# Patient Record
Sex: FEMALE | Race: WHITE | HISPANIC OR LATINO | Employment: OTHER | URBAN - METROPOLITAN AREA
[De-identification: names, ages, dates, MRNs, and addresses within clinical notes are randomized per-mention and may not be internally consistent; named-entity substitution may affect disease eponyms.]

---

## 2017-10-16 ENCOUNTER — OFFICE VISIT - HEALTHEAST (OUTPATIENT)
Dept: FAMILY MEDICINE | Facility: CLINIC | Age: 22
End: 2017-10-16

## 2017-10-16 ENCOUNTER — COMMUNICATION - HEALTHEAST (OUTPATIENT)
Dept: FAMILY MEDICINE | Facility: CLINIC | Age: 22
End: 2017-10-16

## 2017-10-16 DIAGNOSIS — Z34.90 PREGNANCY: ICD-10-CM

## 2017-10-16 ASSESSMENT — MIFFLIN-ST. JEOR: SCORE: 1499.22

## 2017-11-06 ENCOUNTER — COMMUNICATION - HEALTHEAST (OUTPATIENT)
Dept: SCHEDULING | Facility: CLINIC | Age: 22
End: 2017-11-06

## 2017-11-07 ENCOUNTER — OFFICE VISIT - HEALTHEAST (OUTPATIENT)
Dept: FAMILY MEDICINE | Facility: CLINIC | Age: 22
End: 2017-11-07

## 2017-11-07 DIAGNOSIS — O21.9 VOMITING DURING PREGNANCY: ICD-10-CM

## 2017-11-07 ASSESSMENT — MIFFLIN-ST. JEOR: SCORE: 1476.77

## 2017-11-21 ENCOUNTER — AMBULATORY - HEALTHEAST (OUTPATIENT)
Dept: FAMILY MEDICINE | Facility: CLINIC | Age: 22
End: 2017-11-21

## 2017-11-21 ENCOUNTER — PRENATAL OFFICE VISIT - HEALTHEAST (OUTPATIENT)
Dept: FAMILY MEDICINE | Facility: CLINIC | Age: 22
End: 2017-11-21

## 2017-11-21 DIAGNOSIS — Z34.90 PREGNANCY: ICD-10-CM

## 2017-11-21 DIAGNOSIS — O23.40 UTI IN PREGNANCY: ICD-10-CM

## 2017-11-21 DIAGNOSIS — Z12.4 CERVICAL CANCER SCREENING: ICD-10-CM

## 2017-11-21 DIAGNOSIS — Z23 NEED FOR IMMUNIZATION AGAINST INFLUENZA: ICD-10-CM

## 2017-11-21 LAB — HIV 1+2 AB+HIV1 P24 AG SERPL QL IA: NEGATIVE

## 2017-11-21 ASSESSMENT — MIFFLIN-ST. JEOR: SCORE: 1479.47

## 2017-11-22 LAB
HBV SURFACE AG SERPL QL IA: NEGATIVE
SYPHILIS RPR SCREEN - HISTORICAL: NORMAL

## 2017-11-24 ENCOUNTER — HOSPITAL ENCOUNTER (OUTPATIENT)
Dept: ULTRASOUND IMAGING | Facility: CLINIC | Age: 22
Discharge: HOME | End: 2017-11-24
Attending: FAMILY MEDICINE

## 2017-11-24 ENCOUNTER — AMBULATORY - HEALTHEAST (OUTPATIENT)
Dept: FAMILY MEDICINE | Facility: CLINIC | Age: 22
End: 2017-11-24

## 2017-11-24 DIAGNOSIS — Z34.90 PREGNANCY: ICD-10-CM

## 2017-11-24 DIAGNOSIS — Z12.4 CERVICAL CANCER SCREENING: ICD-10-CM

## 2017-12-06 ENCOUNTER — COMMUNICATION - HEALTHEAST (OUTPATIENT)
Dept: FAMILY MEDICINE | Facility: CLINIC | Age: 22
End: 2017-12-06

## 2017-12-19 ENCOUNTER — PRENATAL OFFICE VISIT - HEALTHEAST (OUTPATIENT)
Dept: FAMILY MEDICINE | Facility: CLINIC | Age: 22
End: 2017-12-19

## 2017-12-19 DIAGNOSIS — Z34.90 PREGNANCY: ICD-10-CM

## 2017-12-19 ASSESSMENT — MIFFLIN-ST. JEOR: SCORE: 1465.29

## 2018-01-15 ENCOUNTER — PRENATAL OFFICE VISIT - HEALTHEAST (OUTPATIENT)
Dept: FAMILY MEDICINE | Facility: CLINIC | Age: 23
End: 2018-01-15

## 2018-01-15 DIAGNOSIS — Z34.90 PREGNANCY: ICD-10-CM

## 2018-01-15 ASSESSMENT — MIFFLIN-ST. JEOR: SCORE: 1452.82

## 2018-01-30 ENCOUNTER — HOSPITAL ENCOUNTER (OUTPATIENT)
Dept: ULTRASOUND IMAGING | Facility: CLINIC | Age: 23
Discharge: HOME | End: 2018-01-30
Attending: FAMILY MEDICINE

## 2018-01-30 DIAGNOSIS — Z34.90 PREGNANCY: ICD-10-CM

## 2018-02-16 ENCOUNTER — PRENATAL OFFICE VISIT - HEALTHEAST (OUTPATIENT)
Dept: FAMILY MEDICINE | Facility: CLINIC | Age: 23
End: 2018-02-16

## 2018-02-16 DIAGNOSIS — Z34.90 PREGNANCY: ICD-10-CM

## 2018-02-16 DIAGNOSIS — O09.299 H/O PRE-ECLAMPSIA IN PRIOR PREGNANCY, CURRENTLY PREGNANT: ICD-10-CM

## 2018-02-16 ASSESSMENT — MIFFLIN-ST. JEOR: SCORE: 1484.57

## 2018-03-16 ENCOUNTER — PRENATAL OFFICE VISIT - HEALTHEAST (OUTPATIENT)
Dept: FAMILY MEDICINE | Facility: CLINIC | Age: 23
End: 2018-03-16

## 2018-03-16 DIAGNOSIS — Z34.90 PREGNANCY: ICD-10-CM

## 2018-03-16 LAB
ALBUMIN UR-MCNC: ABNORMAL MG/DL
AMORPH CRY #/AREA URNS HPF: ABNORMAL /[HPF]
APPEARANCE UR: ABNORMAL
BACTERIA #/AREA URNS HPF: ABNORMAL HPF
BILIRUB UR QL STRIP: NEGATIVE
CAOX CRY #/AREA URNS HPF: PRESENT /[HPF]
COLOR UR AUTO: YELLOW
GLUCOSE UR STRIP-MCNC: NEGATIVE MG/DL
HGB UR QL STRIP: NEGATIVE
KETONES UR STRIP-MCNC: NEGATIVE MG/DL
LEUKOCYTE ESTERASE UR QL STRIP: ABNORMAL
MUCOUS THREADS #/AREA URNS LPF: ABNORMAL LPF
NITRATE UR QL: NEGATIVE
PH UR STRIP: 6.5 [PH] (ref 5–8)
RBC #/AREA URNS AUTO: ABNORMAL HPF
SP GR UR STRIP: 1.02 (ref 1–1.03)
SQUAMOUS #/AREA URNS AUTO: >100 LPF
UROBILINOGEN UR STRIP-ACNC: ABNORMAL
WBC #/AREA URNS AUTO: ABNORMAL HPF

## 2018-03-16 ASSESSMENT — MIFFLIN-ST. JEOR: SCORE: 1519.72

## 2018-03-19 LAB — BACTERIA SPEC CULT: ABNORMAL

## 2018-03-20 ENCOUNTER — AMBULATORY - HEALTHEAST (OUTPATIENT)
Dept: FAMILY MEDICINE | Facility: CLINIC | Age: 23
End: 2018-03-20

## 2018-03-20 DIAGNOSIS — O23.40 UTI (URINARY TRACT INFECTION) DURING PREGNANCY: ICD-10-CM

## 2018-04-09 ENCOUNTER — AMBULATORY - HEALTHEAST (OUTPATIENT)
Dept: NURSING | Facility: CLINIC | Age: 23
End: 2018-04-09

## 2018-04-13 ENCOUNTER — AMBULATORY - HEALTHEAST (OUTPATIENT)
Dept: FAMILY MEDICINE | Facility: CLINIC | Age: 23
End: 2018-04-13

## 2018-04-13 ENCOUNTER — PRENATAL OFFICE VISIT - HEALTHEAST (OUTPATIENT)
Dept: FAMILY MEDICINE | Facility: CLINIC | Age: 23
End: 2018-04-13

## 2018-04-13 DIAGNOSIS — O09.299 H/O PRE-ECLAMPSIA IN PRIOR PREGNANCY, CURRENTLY PREGNANT: ICD-10-CM

## 2018-04-13 DIAGNOSIS — O99.019 ANEMIA IN PREGNANCY: ICD-10-CM

## 2018-04-13 DIAGNOSIS — Z34.90 PREGNANCY: ICD-10-CM

## 2018-04-13 DIAGNOSIS — O23.40 UTI (URINARY TRACT INFECTION) DURING PREGNANCY: ICD-10-CM

## 2018-04-13 DIAGNOSIS — R73.09 GLUCOSE TOLERANCE TEST ABNORMAL: ICD-10-CM

## 2018-04-13 LAB
ALBUMIN UR-MCNC: ABNORMAL MG/DL
ALT SERPL W P-5'-P-CCNC: 16 U/L (ref 0–45)
AMORPH CRY #/AREA URNS HPF: ABNORMAL /[HPF]
APPEARANCE UR: ABNORMAL
APTT PPP: 28 SECONDS (ref 24–37)
AST SERPL W P-5'-P-CCNC: 21 U/L (ref 0–40)
BACTERIA #/AREA URNS HPF: ABNORMAL HPF
BILIRUB UR QL STRIP: NEGATIVE
COLOR UR AUTO: YELLOW
CREAT SERPL-MCNC: 0.53 MG/DL (ref 0.6–1.1)
ERYTHROCYTE [DISTWIDTH] IN BLOOD BY AUTOMATED COUNT: 13.3 % (ref 11–14.5)
FASTING STATUS PATIENT QL REPORTED: NO
GFR SERPL CREATININE-BSD FRML MDRD: >60 ML/MIN/1.73M2
GLUCOSE 1H P 50 G GLC PO SERPL-MCNC: 150 MG/DL (ref 70–139)
GLUCOSE UR STRIP-MCNC: ABNORMAL MG/DL
HCT VFR BLD AUTO: 27.2 % (ref 35–47)
HGB BLD-MCNC: 9 G/DL (ref 12–16)
HGB UR QL STRIP: NEGATIVE
INR PPP: 0.9 (ref 0.9–1.1)
KETONES UR STRIP-MCNC: ABNORMAL MG/DL
LEUKOCYTE ESTERASE UR QL STRIP: ABNORMAL
MCH RBC QN AUTO: 24.2 PG (ref 27–34)
MCHC RBC AUTO-ENTMCNC: 33 G/DL (ref 32–36)
MCV RBC AUTO: 73 FL (ref 80–100)
MUCOUS THREADS #/AREA URNS LPF: ABNORMAL LPF
NITRATE UR QL: NEGATIVE
PH UR STRIP: 6.5 [PH] (ref 5–8)
PLATELET # BLD AUTO: 271 THOU/UL (ref 140–440)
PMV BLD AUTO: 9.3 FL (ref 7–10)
RBC # BLD AUTO: 3.7 MILL/UL (ref 3.8–5.4)
RBC #/AREA URNS AUTO: ABNORMAL HPF
SP GR UR STRIP: 1.02 (ref 1–1.03)
SQUAMOUS #/AREA URNS AUTO: ABNORMAL LPF
URATE SERPL-MCNC: 2.9 MG/DL (ref 2–7.5)
UROBILINOGEN UR STRIP-ACNC: ABNORMAL
WBC #/AREA URNS AUTO: ABNORMAL HPF
WBC: 8.6 THOU/UL (ref 4–11)

## 2018-04-13 ASSESSMENT — MIFFLIN-ST. JEOR: SCORE: 1525.39

## 2018-04-14 LAB
CREAT UR-MCNC: 121.1 MG/DL
PROTEIN, RANDOM URINE - HISTORICAL: 19 MG/DL
PROTEIN/CREAT RATIO, RANDOM UR: 0.16
T PALLIDUM AB SER QL: NEGATIVE

## 2018-04-17 ENCOUNTER — AMBULATORY - HEALTHEAST (OUTPATIENT)
Dept: LAB | Facility: CLINIC | Age: 23
End: 2018-04-17

## 2018-04-17 ENCOUNTER — COMMUNICATION - HEALTHEAST (OUTPATIENT)
Dept: FAMILY MEDICINE | Facility: CLINIC | Age: 23
End: 2018-04-17

## 2018-04-17 DIAGNOSIS — R73.09 GLUCOSE TOLERANCE TEST ABNORMAL: ICD-10-CM

## 2018-04-17 LAB
BACTERIA SPEC CULT: ABNORMAL
FASTING STATUS PATIENT QL REPORTED: YES
GLUCOSE 1H P 100 G GLC PO SERPL-MCNC: 147 MG/DL
GLUCOSE 2H P 100 G GLC PO SERPL-MCNC: 148 MG/DL
GLUCOSE 3H P 100 G GLC PO SERPL-MCNC: 124 MG/DL
GLUCOSE P FAST SERPL-MCNC: 84 MG/DL

## 2018-04-27 ENCOUNTER — PRENATAL OFFICE VISIT - HEALTHEAST (OUTPATIENT)
Dept: FAMILY MEDICINE | Facility: CLINIC | Age: 23
End: 2018-04-27

## 2018-04-27 DIAGNOSIS — O99.019 ANEMIA IN PREGNANCY: ICD-10-CM

## 2018-04-27 DIAGNOSIS — O09.299 H/O PRE-ECLAMPSIA IN PRIOR PREGNANCY, CURRENTLY PREGNANT: ICD-10-CM

## 2018-04-27 DIAGNOSIS — Z34.90 PREGNANCY: ICD-10-CM

## 2018-04-27 ASSESSMENT — MIFFLIN-ST. JEOR: SCORE: 1520.85

## 2018-05-11 ENCOUNTER — PRENATAL OFFICE VISIT - HEALTHEAST (OUTPATIENT)
Dept: FAMILY MEDICINE | Facility: CLINIC | Age: 23
End: 2018-05-11

## 2018-05-11 DIAGNOSIS — O09.299 H/O PRE-ECLAMPSIA IN PRIOR PREGNANCY, CURRENTLY PREGNANT: ICD-10-CM

## 2018-05-11 DIAGNOSIS — Z34.90 PREGNANCY: ICD-10-CM

## 2018-05-11 ASSESSMENT — MIFFLIN-ST. JEOR: SCORE: 1534.46

## 2018-05-14 ENCOUNTER — AMBULATORY - HEALTHEAST (OUTPATIENT)
Dept: NURSING | Facility: CLINIC | Age: 23
End: 2018-05-14

## 2018-05-22 ENCOUNTER — COMMUNICATION - HEALTHEAST (OUTPATIENT)
Dept: FAMILY MEDICINE | Facility: CLINIC | Age: 23
End: 2018-05-22

## 2018-05-24 ENCOUNTER — PRENATAL OFFICE VISIT - HEALTHEAST (OUTPATIENT)
Dept: FAMILY MEDICINE | Facility: CLINIC | Age: 23
End: 2018-05-24

## 2018-05-24 DIAGNOSIS — O09.299 H/O PRE-ECLAMPSIA IN PRIOR PREGNANCY, CURRENTLY PREGNANT: ICD-10-CM

## 2018-05-24 DIAGNOSIS — Z34.90 PREGNANCY: ICD-10-CM

## 2018-05-24 ASSESSMENT — MIFFLIN-ST. JEOR: SCORE: 1543.53

## 2018-05-25 LAB
ALLERGIC TO PENICILLIN: NO
GP B STREP DNA SPEC QL NAA+PROBE: NEGATIVE

## 2018-05-31 ENCOUNTER — PRENATAL OFFICE VISIT - HEALTHEAST (OUTPATIENT)
Dept: FAMILY MEDICINE | Facility: CLINIC | Age: 23
End: 2018-05-31

## 2018-05-31 DIAGNOSIS — Z34.90 PREGNANCY: ICD-10-CM

## 2018-05-31 ASSESSMENT — MIFFLIN-ST. JEOR: SCORE: 1552.61

## 2018-06-07 ENCOUNTER — PRENATAL OFFICE VISIT - HEALTHEAST (OUTPATIENT)
Dept: FAMILY MEDICINE | Facility: CLINIC | Age: 23
End: 2018-06-07

## 2018-06-07 DIAGNOSIS — Z34.90 PREGNANCY: ICD-10-CM

## 2018-06-07 DIAGNOSIS — L29.9 ITCHING: ICD-10-CM

## 2018-06-07 DIAGNOSIS — O09.299 H/O PRE-ECLAMPSIA IN PRIOR PREGNANCY, CURRENTLY PREGNANT: ICD-10-CM

## 2018-06-07 LAB
ALBUMIN UR-MCNC: ABNORMAL MG/DL
ALT SERPL W P-5'-P-CCNC: 18 U/L (ref 0–45)
APTT PPP: 27 SECONDS (ref 24–37)
AST SERPL W P-5'-P-CCNC: 25 U/L (ref 0–40)
CREAT SERPL-MCNC: 0.6 MG/DL (ref 0.6–1.1)
CREAT UR-MCNC: 175.5 MG/DL
ERYTHROCYTE [DISTWIDTH] IN BLOOD BY AUTOMATED COUNT: 16.6 % (ref 11–14.5)
GFR SERPL CREATININE-BSD FRML MDRD: >60 ML/MIN/1.73M2
GLUCOSE UR STRIP-MCNC: NEGATIVE MG/DL
HCT VFR BLD AUTO: 30.3 % (ref 35–47)
HGB BLD-MCNC: 9 G/DL (ref 12–16)
INR PPP: 0.9 (ref 0.9–1.1)
KETONES UR STRIP-MCNC: NEGATIVE MG/DL
MCH RBC QN AUTO: 21.8 PG (ref 27–34)
MCHC RBC AUTO-ENTMCNC: 29.7 G/DL (ref 32–36)
MCV RBC AUTO: 73 FL (ref 80–100)
PLATELET # BLD AUTO: 207 THOU/UL (ref 140–440)
PROTEIN, RANDOM URINE - HISTORICAL: 31 MG/DL
PROTEIN/CREAT RATIO, RANDOM UR: 0.18
RBC # BLD AUTO: 4.13 MILL/UL (ref 3.8–5.4)
URATE SERPL-MCNC: 4.6 MG/DL (ref 2–7.5)
WBC: 6.3 THOU/UL (ref 4–11)

## 2018-06-07 ASSESSMENT — MIFFLIN-ST. JEOR: SCORE: 1551.47

## 2018-06-08 LAB — BILE AC SERPL-SCNC: 22 UMOL/L (ref 0–10)

## 2018-06-11 ENCOUNTER — HOME CARE/HOSPICE - HEALTHEAST (OUTPATIENT)
Dept: HOME HEALTH SERVICES | Facility: HOME HEALTH | Age: 23
End: 2018-06-11

## 2018-06-14 ENCOUNTER — HOME CARE/HOSPICE - HEALTHEAST (OUTPATIENT)
Dept: HOME HEALTH SERVICES | Facility: HOME HEALTH | Age: 23
End: 2018-06-14

## 2018-06-27 ENCOUNTER — COMMUNICATION - HEALTHEAST (OUTPATIENT)
Dept: SCHEDULING | Facility: CLINIC | Age: 23
End: 2018-06-27

## 2018-08-17 ENCOUNTER — OFFICE VISIT - HEALTHEAST (OUTPATIENT)
Dept: FAMILY MEDICINE | Facility: CLINIC | Age: 23
End: 2018-08-17

## 2018-08-17 LAB
HCG UR QL: NEGATIVE
SP GR UR STRIP: 1.02 (ref 1–1.03)

## 2018-08-17 ASSESSMENT — MIFFLIN-ST. JEOR: SCORE: 1466.42

## 2018-11-16 ENCOUNTER — OFFICE VISIT - HEALTHEAST (OUTPATIENT)
Dept: FAMILY MEDICINE | Facility: CLINIC | Age: 23
End: 2018-11-16

## 2018-11-16 DIAGNOSIS — Z30.017 ENCOUNTER FOR INITIAL PRESCRIPTION OF IMPLANTABLE SUBDERMAL CONTRACEPTIVE: ICD-10-CM

## 2018-11-16 ASSESSMENT — MIFFLIN-ST. JEOR: SCORE: 1525.39

## 2019-02-25 ENCOUNTER — COMMUNICATION - HEALTHEAST (OUTPATIENT)
Dept: FAMILY MEDICINE | Facility: CLINIC | Age: 24
End: 2019-02-25

## 2019-07-18 ENCOUNTER — COMMUNICATION - HEALTHEAST (OUTPATIENT)
Dept: FAMILY MEDICINE | Facility: CLINIC | Age: 24
End: 2019-07-18

## 2019-07-24 ENCOUNTER — COMMUNICATION - HEALTHEAST (OUTPATIENT)
Dept: OBGYN | Facility: HOSPITAL | Age: 24
End: 2019-07-24

## 2019-08-06 ENCOUNTER — AMBULATORY - HEALTHEAST (OUTPATIENT)
Dept: FAMILY MEDICINE | Facility: CLINIC | Age: 24
End: 2019-08-06

## 2019-08-19 ENCOUNTER — COMMUNICATION - HEALTHEAST (OUTPATIENT)
Dept: FAMILY MEDICINE | Facility: CLINIC | Age: 24
End: 2019-08-19

## 2019-08-22 ENCOUNTER — OFFICE VISIT - HEALTHEAST (OUTPATIENT)
Dept: FAMILY MEDICINE | Facility: CLINIC | Age: 24
End: 2019-08-22

## 2019-08-22 DIAGNOSIS — Z30.430 ENCOUNTER FOR IUD INSERTION: ICD-10-CM

## 2019-08-22 DIAGNOSIS — Z30.46 NEXPLANON REMOVAL: ICD-10-CM

## 2019-08-22 DIAGNOSIS — Z97.5 IUD (INTRAUTERINE DEVICE) IN PLACE: ICD-10-CM

## 2019-08-22 DIAGNOSIS — Z11.3 SCREEN FOR STD (SEXUALLY TRANSMITTED DISEASE): ICD-10-CM

## 2019-08-22 ASSESSMENT — MIFFLIN-ST. JEOR: SCORE: 1597.97

## 2019-08-23 LAB
C TRACH DNA SPEC QL PROBE+SIG AMP: NEGATIVE
N GONORRHOEA DNA SPEC QL NAA+PROBE: NEGATIVE

## 2019-09-23 ENCOUNTER — OFFICE VISIT - HEALTHEAST (OUTPATIENT)
Dept: FAMILY MEDICINE | Facility: CLINIC | Age: 24
End: 2019-09-23

## 2019-09-23 DIAGNOSIS — Z30.431 IUD CHECK UP: ICD-10-CM

## 2019-09-23 DIAGNOSIS — Z23 NEED FOR VACCINATION: ICD-10-CM

## 2019-09-23 ASSESSMENT — MIFFLIN-ST. JEOR: SCORE: 1611.57

## 2020-02-05 ENCOUNTER — COMMUNICATION - HEALTHEAST (OUTPATIENT)
Dept: SCHEDULING | Facility: CLINIC | Age: 25
End: 2020-02-05

## 2020-02-06 ENCOUNTER — OFFICE VISIT - HEALTHEAST (OUTPATIENT)
Dept: FAMILY MEDICINE | Facility: CLINIC | Age: 25
End: 2020-02-06

## 2020-02-06 DIAGNOSIS — Z97.5 BREAKTHROUGH BLEEDING WITH IUD: ICD-10-CM

## 2020-02-06 DIAGNOSIS — N92.1 BREAKTHROUGH BLEEDING WITH IUD: ICD-10-CM

## 2020-02-06 DIAGNOSIS — N93.9 VAGINAL BLEEDING: ICD-10-CM

## 2020-02-06 LAB
HCG UR QL: NEGATIVE
HGB BLD-MCNC: 11.7 G/DL (ref 12–16)

## 2020-02-06 ASSESSMENT — MIFFLIN-ST. JEOR: SCORE: 1625.75

## 2020-03-10 ENCOUNTER — OFFICE VISIT - HEALTHEAST (OUTPATIENT)
Dept: FAMILY MEDICINE | Facility: CLINIC | Age: 25
End: 2020-03-10

## 2020-03-10 ENCOUNTER — COMMUNICATION - HEALTHEAST (OUTPATIENT)
Dept: SURGERY | Facility: CLINIC | Age: 25
End: 2020-03-10

## 2020-03-10 DIAGNOSIS — R63.5 WEIGHT GAIN: ICD-10-CM

## 2020-03-10 DIAGNOSIS — E66.3 OVERWEIGHT: ICD-10-CM

## 2020-03-10 DIAGNOSIS — L83 ACANTHOSIS NIGRICANS: ICD-10-CM

## 2020-03-10 DIAGNOSIS — N92.6 IRREGULAR MENSES: ICD-10-CM

## 2020-03-10 LAB
HBA1C MFR BLD: 6 %
PROLACTIN SERPL-MCNC: <4 NG/ML (ref 0–20)
TSH SERPL DL<=0.005 MIU/L-ACNC: 1.63 UIU/ML (ref 0.3–5)

## 2020-03-10 ASSESSMENT — MIFFLIN-ST. JEOR: SCORE: 1638.22

## 2020-03-12 ENCOUNTER — AMBULATORY - HEALTHEAST (OUTPATIENT)
Dept: FAMILY MEDICINE | Facility: CLINIC | Age: 25
End: 2020-03-12

## 2020-03-12 DIAGNOSIS — R73.03 PREDIABETES: ICD-10-CM

## 2020-03-12 LAB — DHEA-S SERPL-MCNC: 106 UG/DL (ref 65–380)

## 2020-03-13 ENCOUNTER — COMMUNICATION - HEALTHEAST (OUTPATIENT)
Dept: FAMILY MEDICINE | Facility: CLINIC | Age: 25
End: 2020-03-13

## 2020-03-13 LAB
ANDROST SERPL-MCNC: 1.57 NG/ML (ref 0.26–2.14)
SHBG SERPL-SCNC: 28 NMOL/L (ref 30–135)
TESTOST FREE SERPL-MCNC: 0.7 NG/DL (ref 0.08–0.74)
TESTOST SERPL-MCNC: 36 NG/DL (ref 8–60)

## 2020-06-05 ENCOUNTER — COMMUNICATION - HEALTHEAST (OUTPATIENT)
Dept: SURGERY | Facility: CLINIC | Age: 25
End: 2020-06-05

## 2020-06-17 ENCOUNTER — OFFICE VISIT - HEALTHEAST (OUTPATIENT)
Dept: FAMILY MEDICINE | Facility: CLINIC | Age: 25
End: 2020-06-17

## 2020-06-17 DIAGNOSIS — Z13.228 SCREENING FOR ENDOCRINE, NUTRITIONAL, METABOLIC AND IMMUNITY DISORDER: ICD-10-CM

## 2020-06-17 DIAGNOSIS — Z13.228 SCREENING FOR METABOLIC DISORDER: ICD-10-CM

## 2020-06-17 DIAGNOSIS — Z97.5 IUD (INTRAUTERINE DEVICE) IN PLACE: ICD-10-CM

## 2020-06-17 DIAGNOSIS — Z13.21 SCREENING FOR ENDOCRINE, NUTRITIONAL, METABOLIC AND IMMUNITY DISORDER: ICD-10-CM

## 2020-06-17 DIAGNOSIS — E66.812 CLASS 2 OBESITY: ICD-10-CM

## 2020-06-17 DIAGNOSIS — Z13.29 SCREENING FOR THYROID DISORDER: ICD-10-CM

## 2020-06-17 DIAGNOSIS — Z13.29 SCREENING FOR ENDOCRINE, NUTRITIONAL, METABOLIC AND IMMUNITY DISORDER: ICD-10-CM

## 2020-06-17 DIAGNOSIS — Z13.220 SCREENING, LIPID: ICD-10-CM

## 2020-06-17 DIAGNOSIS — Z13.0 SCREENING FOR ENDOCRINE, NUTRITIONAL, METABOLIC AND IMMUNITY DISORDER: ICD-10-CM

## 2020-06-17 DIAGNOSIS — Z13.0 SCREENING, ANEMIA, DEFICIENCY, IRON: ICD-10-CM

## 2020-06-17 DIAGNOSIS — Z13.1 SCREENING FOR DIABETES MELLITUS: ICD-10-CM

## 2020-06-17 ASSESSMENT — MIFFLIN-ST. JEOR: SCORE: 1621.79

## 2020-06-17 NOTE — ASSESSMENT & PLAN NOTE
Patient is interested in weight loss.  Her BMI is 35 today.  We discussed starting formal medical weight loss management.  She is interested in doing this.  She cannot afford 3 pack of health coaching or 24-week program at this time.  That she just like to begin with dietitian and doctor visits.  The plan is to get some preliminary labs and schedule a weight loss intake formally with questionnaire.  The question will need to be translated into Polish for her.

## 2020-06-22 ENCOUNTER — COMMUNICATION - HEALTHEAST (OUTPATIENT)
Dept: FAMILY MEDICINE | Facility: CLINIC | Age: 25
End: 2020-06-22

## 2020-06-25 ENCOUNTER — AMBULATORY - HEALTHEAST (OUTPATIENT)
Dept: LAB | Facility: CLINIC | Age: 25
End: 2020-06-25

## 2020-06-25 DIAGNOSIS — Z13.220 SCREENING, LIPID: ICD-10-CM

## 2020-06-25 DIAGNOSIS — Z13.228 SCREENING FOR ENDOCRINE, NUTRITIONAL, METABOLIC AND IMMUNITY DISORDER: ICD-10-CM

## 2020-06-25 DIAGNOSIS — Z13.0 SCREENING FOR ENDOCRINE, NUTRITIONAL, METABOLIC AND IMMUNITY DISORDER: ICD-10-CM

## 2020-06-25 DIAGNOSIS — Z13.21 SCREENING FOR ENDOCRINE, NUTRITIONAL, METABOLIC AND IMMUNITY DISORDER: ICD-10-CM

## 2020-06-25 DIAGNOSIS — Z13.1 SCREENING FOR DIABETES MELLITUS: ICD-10-CM

## 2020-06-25 DIAGNOSIS — Z13.0 SCREENING, ANEMIA, DEFICIENCY, IRON: ICD-10-CM

## 2020-06-25 DIAGNOSIS — Z13.228 SCREENING FOR METABOLIC DISORDER: ICD-10-CM

## 2020-06-25 DIAGNOSIS — Z13.29 SCREENING FOR ENDOCRINE, NUTRITIONAL, METABOLIC AND IMMUNITY DISORDER: ICD-10-CM

## 2020-06-25 LAB
ALBUMIN SERPL-MCNC: 4 G/DL (ref 3.5–5)
ALP SERPL-CCNC: 131 U/L (ref 45–120)
ALT SERPL W P-5'-P-CCNC: 36 U/L (ref 0–45)
ANION GAP SERPL CALCULATED.3IONS-SCNC: 10 MMOL/L (ref 5–18)
AST SERPL W P-5'-P-CCNC: 28 U/L (ref 0–40)
BASOPHILS # BLD AUTO: 0.1 THOU/UL (ref 0–0.2)
BASOPHILS NFR BLD AUTO: 1 % (ref 0–2)
BILIRUB SERPL-MCNC: 0.3 MG/DL (ref 0–1)
BUN SERPL-MCNC: 8 MG/DL (ref 8–22)
CALCIUM SERPL-MCNC: 9.5 MG/DL (ref 8.5–10.5)
CHLORIDE BLD-SCNC: 103 MMOL/L (ref 98–107)
CHOLEST SERPL-MCNC: 157 MG/DL
CO2 SERPL-SCNC: 25 MMOL/L (ref 22–31)
CREAT SERPL-MCNC: 0.64 MG/DL (ref 0.6–1.1)
EOSINOPHIL # BLD AUTO: 0.2 THOU/UL (ref 0–0.4)
EOSINOPHIL NFR BLD AUTO: 2 % (ref 0–6)
ERYTHROCYTE [DISTWIDTH] IN BLOOD BY AUTOMATED COUNT: 13.3 % (ref 11–14.5)
FASTING STATUS PATIENT QL REPORTED: YES
GFR SERPL CREATININE-BSD FRML MDRD: >60 ML/MIN/1.73M2
GLUCOSE BLD-MCNC: 97 MG/DL (ref 70–125)
HBA1C MFR BLD: 5.8 % (ref 3.5–6)
HCT VFR BLD AUTO: 38.6 % (ref 35–47)
HDLC SERPL-MCNC: 42 MG/DL
HGB BLD-MCNC: 13 G/DL (ref 12–16)
LDLC SERPL CALC-MCNC: 66 MG/DL
LYMPHOCYTES # BLD AUTO: 2.9 THOU/UL (ref 0.8–4.4)
LYMPHOCYTES NFR BLD AUTO: 29 % (ref 20–40)
MCH RBC QN AUTO: 25.7 PG (ref 27–34)
MCHC RBC AUTO-ENTMCNC: 33.6 G/DL (ref 32–36)
MCV RBC AUTO: 76 FL (ref 80–100)
MONOCYTES # BLD AUTO: 0.4 THOU/UL (ref 0–0.9)
MONOCYTES NFR BLD AUTO: 4 % (ref 2–10)
NEUTROPHILS # BLD AUTO: 6.4 THOU/UL (ref 2–7.7)
NEUTROPHILS NFR BLD AUTO: 64 % (ref 50–70)
PLATELET # BLD AUTO: 295 THOU/UL (ref 140–440)
PMV BLD AUTO: 9.5 FL (ref 7–10)
POTASSIUM BLD-SCNC: 3.7 MMOL/L (ref 3.5–5)
PROT SERPL-MCNC: 7.9 G/DL (ref 6–8)
RBC # BLD AUTO: 5.04 MILL/UL (ref 3.8–5.4)
SODIUM SERPL-SCNC: 138 MMOL/L (ref 136–145)
TRIGL SERPL-MCNC: 246 MG/DL
WBC: 10 THOU/UL (ref 4–11)

## 2020-06-26 LAB
25(OH)D3 SERPL-MCNC: 7.6 NG/ML (ref 30–80)
25(OH)D3 SERPL-MCNC: 7.6 NG/ML (ref 30–80)

## 2020-06-29 ENCOUNTER — COMMUNICATION - HEALTHEAST (OUTPATIENT)
Dept: FAMILY MEDICINE | Facility: CLINIC | Age: 25
End: 2020-06-29

## 2020-07-07 ENCOUNTER — AMBULATORY - HEALTHEAST (OUTPATIENT)
Dept: FAMILY MEDICINE | Facility: CLINIC | Age: 25
End: 2020-07-07

## 2020-07-07 DIAGNOSIS — R79.89 LOW VITAMIN D LEVEL: ICD-10-CM

## 2020-07-15 ENCOUNTER — COMMUNICATION - HEALTHEAST (OUTPATIENT)
Dept: SCHEDULING | Facility: CLINIC | Age: 25
End: 2020-07-15

## 2020-07-22 ENCOUNTER — OFFICE VISIT - HEALTHEAST (OUTPATIENT)
Dept: SURGERY | Facility: CLINIC | Age: 25
End: 2020-07-22

## 2020-07-22 DIAGNOSIS — E66.812 CLASS 2 OBESITY: ICD-10-CM

## 2020-07-22 DIAGNOSIS — E78.1 HYPERTRIGLYCERIDEMIA: ICD-10-CM

## 2020-07-22 DIAGNOSIS — T50.905A DRUG-INDUCED WEIGHT GAIN: ICD-10-CM

## 2020-07-22 DIAGNOSIS — E88.819 INSULIN RESISTANCE: ICD-10-CM

## 2020-07-22 DIAGNOSIS — R63.5 DRUG-INDUCED WEIGHT GAIN: ICD-10-CM

## 2020-07-22 ASSESSMENT — MIFFLIN-ST. JEOR: SCORE: 1626.32

## 2020-07-27 ENCOUNTER — OFFICE VISIT - HEALTHEAST (OUTPATIENT)
Dept: SURGERY | Facility: CLINIC | Age: 25
End: 2020-07-27

## 2020-07-27 DIAGNOSIS — E66.9 OBESITY (BMI 30-39.9): ICD-10-CM

## 2020-07-29 ENCOUNTER — AMBULATORY - HEALTHEAST (OUTPATIENT)
Dept: SURGERY | Facility: CLINIC | Age: 25
End: 2020-07-29

## 2020-07-29 ENCOUNTER — COMMUNICATION - HEALTHEAST (OUTPATIENT)
Dept: SURGERY | Facility: CLINIC | Age: 25
End: 2020-07-29

## 2020-07-29 DIAGNOSIS — E66.812 OBESITY, CLASS II, BMI 35-39.9: ICD-10-CM

## 2020-07-31 ENCOUNTER — COMMUNICATION - HEALTHEAST (OUTPATIENT)
Dept: SURGERY | Facility: CLINIC | Age: 25
End: 2020-07-31

## 2020-08-06 ENCOUNTER — AMBULATORY - HEALTHEAST (OUTPATIENT)
Dept: SURGERY | Facility: CLINIC | Age: 25
End: 2020-08-06

## 2020-08-06 DIAGNOSIS — E66.812 OBESITY, CLASS II, BMI 35-39.9: ICD-10-CM

## 2020-08-12 ENCOUNTER — AMBULATORY - HEALTHEAST (OUTPATIENT)
Dept: SURGERY | Facility: CLINIC | Age: 25
End: 2020-08-12

## 2020-08-12 DIAGNOSIS — E66.812 OBESITY, CLASS II, BMI 35-39.9: ICD-10-CM

## 2020-08-31 ENCOUNTER — OFFICE VISIT - HEALTHEAST (OUTPATIENT)
Dept: SURGERY | Facility: CLINIC | Age: 25
End: 2020-08-31

## 2020-08-31 DIAGNOSIS — Z87.898 HISTORY OF PREDIABETES: ICD-10-CM

## 2020-08-31 DIAGNOSIS — E66.811 OBESITY, CLASS I, BMI 30-34.9: ICD-10-CM

## 2020-09-04 ENCOUNTER — ANESTHESIA - HEALTHEAST (OUTPATIENT)
Dept: SURGERY | Facility: CLINIC | Age: 25
End: 2020-09-04

## 2020-09-04 ENCOUNTER — SURGERY - HEALTHEAST (OUTPATIENT)
Dept: SURGERY | Facility: CLINIC | Age: 25
End: 2020-09-04

## 2020-09-04 ENCOUNTER — COMMUNICATION - HEALTHEAST (OUTPATIENT)
Dept: SCHEDULING | Facility: CLINIC | Age: 25
End: 2020-09-04

## 2020-09-04 ASSESSMENT — MIFFLIN-ST. JEOR: SCORE: 1576.88

## 2020-09-17 ENCOUNTER — OFFICE VISIT - HEALTHEAST (OUTPATIENT)
Dept: FAMILY MEDICINE | Facility: CLINIC | Age: 25
End: 2020-09-17

## 2020-09-17 DIAGNOSIS — Z23 IMMUNIZATION DUE: ICD-10-CM

## 2020-09-17 DIAGNOSIS — K80.20 CALCULUS OF GALLBLADDER WITHOUT CHOLECYSTITIS WITHOUT OBSTRUCTION: ICD-10-CM

## 2020-09-17 DIAGNOSIS — Z09 HOSPITAL DISCHARGE FOLLOW-UP: ICD-10-CM

## 2020-09-17 ASSESSMENT — MIFFLIN-ST. JEOR: SCORE: 1568.77

## 2020-11-02 ENCOUNTER — OFFICE VISIT - HEALTHEAST (OUTPATIENT)
Dept: FAMILY MEDICINE | Facility: CLINIC | Age: 25
End: 2020-11-02

## 2020-11-02 DIAGNOSIS — Z20.822 SUSPECTED COVID-19 VIRUS INFECTION: ICD-10-CM

## 2020-11-02 DIAGNOSIS — J02.0 STREP PHARYNGITIS: ICD-10-CM

## 2020-11-02 LAB — DEPRECATED S PYO AG THROAT QL EIA: ABNORMAL

## 2020-11-04 ENCOUNTER — COMMUNICATION - HEALTHEAST (OUTPATIENT)
Dept: SCHEDULING | Facility: CLINIC | Age: 25
End: 2020-11-04

## 2020-11-11 ENCOUNTER — OFFICE VISIT - HEALTHEAST (OUTPATIENT)
Dept: FAMILY MEDICINE | Facility: CLINIC | Age: 25
End: 2020-11-11

## 2020-11-11 DIAGNOSIS — M77.8 RIGHT HAND TENDONITIS: ICD-10-CM

## 2020-11-21 ENCOUNTER — OFFICE VISIT - HEALTHEAST (OUTPATIENT)
Dept: FAMILY MEDICINE | Facility: CLINIC | Age: 25
End: 2020-11-21

## 2020-11-21 DIAGNOSIS — M77.11 LATERAL EPICONDYLITIS OF RIGHT ELBOW: ICD-10-CM

## 2020-11-23 ENCOUNTER — OFFICE VISIT - HEALTHEAST (OUTPATIENT)
Dept: FAMILY MEDICINE | Facility: CLINIC | Age: 25
End: 2020-11-23

## 2020-11-23 DIAGNOSIS — M77.8 TENDONITIS OF WRIST, RIGHT: ICD-10-CM

## 2020-11-23 ASSESSMENT — MIFFLIN-ST. JEOR: SCORE: 1576.43

## 2020-12-01 ENCOUNTER — COMMUNICATION - HEALTHEAST (OUTPATIENT)
Dept: FAMILY MEDICINE | Facility: CLINIC | Age: 25
End: 2020-12-01

## 2020-12-02 ENCOUNTER — COMMUNICATION - HEALTHEAST (OUTPATIENT)
Dept: FAMILY MEDICINE | Facility: CLINIC | Age: 25
End: 2020-12-02

## 2020-12-07 ENCOUNTER — OFFICE VISIT - HEALTHEAST (OUTPATIENT)
Dept: FAMILY MEDICINE | Facility: CLINIC | Age: 25
End: 2020-12-07

## 2020-12-07 DIAGNOSIS — M77.8 TENDONITIS OF WRIST, RIGHT: ICD-10-CM

## 2020-12-07 ASSESSMENT — MIFFLIN-ST. JEOR: SCORE: 1586.63

## 2020-12-14 ENCOUNTER — COMMUNICATION - HEALTHEAST (OUTPATIENT)
Dept: FAMILY MEDICINE | Facility: CLINIC | Age: 25
End: 2020-12-14

## 2020-12-14 DIAGNOSIS — M77.8 RIGHT HAND TENDONITIS: ICD-10-CM

## 2020-12-17 ENCOUNTER — COMMUNICATION - HEALTHEAST (OUTPATIENT)
Dept: FAMILY MEDICINE | Facility: CLINIC | Age: 25
End: 2020-12-17

## 2020-12-19 ENCOUNTER — COMMUNICATION - HEALTHEAST (OUTPATIENT)
Dept: FAMILY MEDICINE | Facility: CLINIC | Age: 25
End: 2020-12-19

## 2020-12-19 DIAGNOSIS — M77.8 RIGHT HAND TENDONITIS: ICD-10-CM

## 2021-01-04 ENCOUNTER — HEALTH MAINTENANCE LETTER (OUTPATIENT)
Age: 26
End: 2021-01-04

## 2021-01-08 ENCOUNTER — AMBULATORY - HEALTHEAST (OUTPATIENT)
Dept: FAMILY MEDICINE | Facility: CLINIC | Age: 26
End: 2021-01-08

## 2021-01-11 ENCOUNTER — OFFICE VISIT - HEALTHEAST (OUTPATIENT)
Dept: SURGERY | Facility: CLINIC | Age: 26
End: 2021-01-11

## 2021-01-11 DIAGNOSIS — E66.812 OBESITY, CLASS II, BMI 35-39.9: ICD-10-CM

## 2021-01-11 DIAGNOSIS — E66.9 OBESITY (BMI 30-39.9): ICD-10-CM

## 2021-02-02 ENCOUNTER — COMMUNICATION - HEALTHEAST (OUTPATIENT)
Dept: FAMILY MEDICINE | Facility: CLINIC | Age: 26
End: 2021-02-02

## 2021-02-11 ENCOUNTER — OFFICE VISIT - HEALTHEAST (OUTPATIENT)
Dept: SURGERY | Facility: CLINIC | Age: 26
End: 2021-02-11

## 2021-02-11 DIAGNOSIS — E66.812 OBESITY, CLASS II, BMI 35-39.9: ICD-10-CM

## 2021-02-11 ASSESSMENT — MIFFLIN-ST. JEOR: SCORE: 1544.68

## 2021-02-23 ENCOUNTER — OFFICE VISIT - HEALTHEAST (OUTPATIENT)
Dept: FAMILY MEDICINE | Facility: CLINIC | Age: 26
End: 2021-02-23

## 2021-02-23 DIAGNOSIS — L65.9 LOSS OF HAIR: ICD-10-CM

## 2021-02-23 DIAGNOSIS — N92.0 MENORRHAGIA WITH REGULAR CYCLE: ICD-10-CM

## 2021-02-23 DIAGNOSIS — Z30.432 ENCOUNTER FOR IUD REMOVAL: ICD-10-CM

## 2021-02-23 LAB
ALBUMIN SERPL-MCNC: 4.1 G/DL (ref 3.5–5)
ALP SERPL-CCNC: 115 U/L (ref 45–120)
ALT SERPL W P-5'-P-CCNC: 19 U/L (ref 0–45)
ANION GAP SERPL CALCULATED.3IONS-SCNC: 8 MMOL/L (ref 5–18)
AST SERPL W P-5'-P-CCNC: 20 U/L (ref 0–40)
BILIRUB SERPL-MCNC: 0.5 MG/DL (ref 0–1)
BUN SERPL-MCNC: 8 MG/DL (ref 8–22)
CALCIUM SERPL-MCNC: 9.2 MG/DL (ref 8.5–10.5)
CHLORIDE BLD-SCNC: 105 MMOL/L (ref 98–107)
CO2 SERPL-SCNC: 26 MMOL/L (ref 22–31)
CREAT SERPL-MCNC: 0.63 MG/DL (ref 0.6–1.1)
ERYTHROCYTE [DISTWIDTH] IN BLOOD BY AUTOMATED COUNT: 13.4 % (ref 11–14.5)
GFR SERPL CREATININE-BSD FRML MDRD: >60 ML/MIN/1.73M2
GLUCOSE BLD-MCNC: 86 MG/DL (ref 70–125)
HCT VFR BLD AUTO: 39.8 % (ref 35–47)
HGB BLD-MCNC: 12.7 G/DL (ref 12–16)
MCH RBC QN AUTO: 25.9 PG (ref 27–34)
MCHC RBC AUTO-ENTMCNC: 31.9 G/DL (ref 32–36)
MCV RBC AUTO: 81 FL (ref 80–100)
PLATELET # BLD AUTO: 298 THOU/UL (ref 140–440)
PMV BLD AUTO: 11.9 FL (ref 7–10)
POTASSIUM BLD-SCNC: 4.1 MMOL/L (ref 3.5–5)
PROT SERPL-MCNC: 7.2 G/DL (ref 6–8)
RBC # BLD AUTO: 4.91 MILL/UL (ref 3.8–5.4)
SODIUM SERPL-SCNC: 139 MMOL/L (ref 136–145)
TSH SERPL DL<=0.005 MIU/L-ACNC: 1.45 UIU/ML (ref 0.3–5)
WBC: 7.1 THOU/UL (ref 4–11)

## 2021-02-23 ASSESSMENT — MIFFLIN-ST. JEOR: SCORE: 1537.31

## 2021-03-08 ENCOUNTER — COMMUNICATION - HEALTHEAST (OUTPATIENT)
Dept: SURGERY | Facility: CLINIC | Age: 26
End: 2021-03-08

## 2021-03-15 ENCOUNTER — COMMUNICATION - HEALTHEAST (OUTPATIENT)
Dept: FAMILY MEDICINE | Facility: CLINIC | Age: 26
End: 2021-03-15

## 2021-03-15 DIAGNOSIS — R73.03 PREDIABETES: ICD-10-CM

## 2021-03-29 ENCOUNTER — OFFICE VISIT - HEALTHEAST (OUTPATIENT)
Dept: SURGERY | Facility: CLINIC | Age: 26
End: 2021-03-29

## 2021-03-29 DIAGNOSIS — Z87.898 HISTORY OF PREDIABETES: ICD-10-CM

## 2021-03-29 DIAGNOSIS — E55.9 VITAMIN D DEFICIENCY: ICD-10-CM

## 2021-03-29 DIAGNOSIS — E66.1 CLASS 1 DRUG-INDUCED OBESITY WITHOUT SERIOUS COMORBIDITY WITH BODY MASS INDEX (BMI) OF 31.0 TO 31.9 IN ADULT: ICD-10-CM

## 2021-03-29 DIAGNOSIS — E66.811 CLASS 1 DRUG-INDUCED OBESITY WITHOUT SERIOUS COMORBIDITY WITH BODY MASS INDEX (BMI) OF 31.0 TO 31.9 IN ADULT: ICD-10-CM

## 2021-04-26 ENCOUNTER — OFFICE VISIT - HEALTHEAST (OUTPATIENT)
Dept: FAMILY MEDICINE | Facility: CLINIC | Age: 26
End: 2021-04-26

## 2021-04-26 DIAGNOSIS — Z12.4 SCREENING FOR CERVICAL CANCER: ICD-10-CM

## 2021-04-26 DIAGNOSIS — Z00.00 HEALTHY ADULT ON ROUTINE PHYSICAL EXAMINATION: ICD-10-CM

## 2021-04-26 DIAGNOSIS — E55.9 VITAMIN D DEFICIENCY: ICD-10-CM

## 2021-04-26 DIAGNOSIS — Z87.898 HISTORY OF PREDIABETES: ICD-10-CM

## 2021-04-26 LAB — HBA1C MFR BLD: 5.7 %

## 2021-04-26 ASSESSMENT — MIFFLIN-ST. JEOR: SCORE: 1534.56

## 2021-04-27 LAB — 25(OH)D3 SERPL-MCNC: 8.9 NG/ML (ref 30–80)

## 2021-04-28 LAB

## 2021-04-30 ENCOUNTER — OFFICE VISIT - HEALTHEAST (OUTPATIENT)
Dept: FAMILY MEDICINE | Facility: CLINIC | Age: 26
End: 2021-04-30

## 2021-04-30 ENCOUNTER — COMMUNICATION - HEALTHEAST (OUTPATIENT)
Dept: SURGERY | Facility: CLINIC | Age: 26
End: 2021-04-30

## 2021-04-30 ENCOUNTER — AMBULATORY - HEALTHEAST (OUTPATIENT)
Dept: SURGERY | Facility: CLINIC | Age: 26
End: 2021-04-30

## 2021-04-30 DIAGNOSIS — R09.81 NASAL CONGESTION: ICD-10-CM

## 2021-04-30 DIAGNOSIS — R07.0 THROAT PAIN: ICD-10-CM

## 2021-04-30 DIAGNOSIS — E56.9 VITAMIN DEFICIENCY: ICD-10-CM

## 2021-04-30 DIAGNOSIS — R53.83 FATIGUE, UNSPECIFIED TYPE: ICD-10-CM

## 2021-04-30 DIAGNOSIS — R11.0 NAUSEA: ICD-10-CM

## 2021-04-30 LAB
ALBUMIN SERPL-MCNC: 4 G/DL (ref 3.5–5)
ALP SERPL-CCNC: 110 U/L (ref 45–120)
ALT SERPL W P-5'-P-CCNC: 24 U/L (ref 0–45)
ANION GAP SERPL CALCULATED.3IONS-SCNC: 8 MMOL/L (ref 5–18)
AST SERPL W P-5'-P-CCNC: 19 U/L (ref 0–40)
BASOPHILS # BLD AUTO: 0.1 THOU/UL (ref 0–0.2)
BASOPHILS NFR BLD AUTO: 1 % (ref 0–2)
BILIRUB SERPL-MCNC: 0.4 MG/DL (ref 0–1)
BUN SERPL-MCNC: 11 MG/DL (ref 8–22)
CALCIUM SERPL-MCNC: 9.1 MG/DL (ref 8.5–10.5)
CHLORIDE BLD-SCNC: 104 MMOL/L (ref 98–107)
CO2 SERPL-SCNC: 25 MMOL/L (ref 22–31)
CREAT SERPL-MCNC: 0.61 MG/DL (ref 0.6–1.1)
DEPRECATED S PYO AG THROAT QL EIA: NORMAL
EOSINOPHIL # BLD AUTO: 0.1 THOU/UL (ref 0–0.4)
EOSINOPHIL NFR BLD AUTO: 2 % (ref 0–6)
ERYTHROCYTE [DISTWIDTH] IN BLOOD BY AUTOMATED COUNT: 13.2 % (ref 11–14.5)
GFR SERPL CREATININE-BSD FRML MDRD: >60 ML/MIN/1.73M2
GLUCOSE BLD-MCNC: 83 MG/DL (ref 70–125)
GROUP A STREP BY PCR: NORMAL
HCT VFR BLD AUTO: 37.4 % (ref 35–47)
HGB BLD-MCNC: 11.9 G/DL (ref 12–16)
IMM GRANULOCYTES # BLD: 0 THOU/UL
IMM GRANULOCYTES NFR BLD: 0 %
LYMPHOCYTES # BLD AUTO: 2.1 THOU/UL (ref 0.8–4.4)
LYMPHOCYTES NFR BLD AUTO: 24 % (ref 20–40)
MCH RBC QN AUTO: 26 PG (ref 27–34)
MCHC RBC AUTO-ENTMCNC: 31.8 G/DL (ref 32–36)
MCV RBC AUTO: 82 FL (ref 80–100)
MONOCYTES # BLD AUTO: 0.6 THOU/UL (ref 0–0.9)
MONOCYTES NFR BLD AUTO: 6 % (ref 2–10)
NEUTROPHILS # BLD AUTO: 5.8 THOU/UL (ref 2–7.7)
NEUTROPHILS NFR BLD AUTO: 67 % (ref 50–70)
PLATELET # BLD AUTO: 276 THOU/UL (ref 140–440)
PMV BLD AUTO: 11.7 FL (ref 7–10)
POTASSIUM BLD-SCNC: 3.5 MMOL/L (ref 3.5–5)
PROT SERPL-MCNC: 7.3 G/DL (ref 6–8)
RBC # BLD AUTO: 4.58 MILL/UL (ref 3.8–5.4)
SODIUM SERPL-SCNC: 137 MMOL/L (ref 136–145)
WBC: 8.7 THOU/UL (ref 4–11)

## 2021-05-01 LAB
SARS-COV-2 PCR COMMENT: NORMAL
SARS-COV-2 RNA SPEC QL NAA+PROBE: NEGATIVE
SARS-COV-2 VIRUS SPECIMEN SOURCE: NORMAL

## 2021-05-02 ENCOUNTER — COMMUNICATION - HEALTHEAST (OUTPATIENT)
Dept: SCHEDULING | Facility: CLINIC | Age: 26
End: 2021-05-02

## 2021-05-19 ENCOUNTER — OFFICE VISIT - HEALTHEAST (OUTPATIENT)
Dept: FAMILY MEDICINE | Facility: CLINIC | Age: 26
End: 2021-05-19

## 2021-05-19 DIAGNOSIS — N92.6 ABNORMAL MENSTRUATION: ICD-10-CM

## 2021-05-19 LAB — HCG UR QL: NEGATIVE

## 2021-05-19 ASSESSMENT — MIFFLIN-ST. JEOR: SCORE: 1531.03

## 2021-05-26 VITALS
RESPIRATION RATE: 16 BRPM | HEART RATE: 83 BPM | DIASTOLIC BLOOD PRESSURE: 81 MMHG | OXYGEN SATURATION: 98 % | TEMPERATURE: 98.9 F | SYSTOLIC BLOOD PRESSURE: 119 MMHG

## 2021-05-27 VITALS
BODY MASS INDEX: 29.93 KG/M2 | WEIGHT: 175.3 LBS | SYSTOLIC BLOOD PRESSURE: 110 MMHG | RESPIRATION RATE: 14 BRPM | OXYGEN SATURATION: 99 % | HEART RATE: 92 BPM | HEIGHT: 64 IN | DIASTOLIC BLOOD PRESSURE: 70 MMHG | TEMPERATURE: 99.1 F

## 2021-05-30 NOTE — TELEPHONE ENCOUNTER
RN cannot approve Refill Request    RN can NOT refill this medication med is not covered by policy/route to provider.    Joao Henriquez, [facility] Triage/Med Refill 7/25/2019    Requested Prescriptions   Pending Prescriptions Disp Refills     ibuprofen (ADVIL,MOTRIN) 800 MG tablet [Pharmacy Med Name: IBUPROFEN 800MG TABLETS] 60 tablet 0     Sig: TAKE 1 TABLET BY MOUTH EVERY 8 HOURS       There is no refill protocol information for this order

## 2021-05-31 VITALS — HEIGHT: 64 IN | BODY MASS INDEX: 28.89 KG/M2 | WEIGHT: 169.25 LBS

## 2021-05-31 VITALS — BODY MASS INDEX: 28.17 KG/M2 | WEIGHT: 165 LBS | HEIGHT: 64 IN

## 2021-05-31 VITALS — BODY MASS INDEX: 27.78 KG/M2 | HEIGHT: 64 IN | WEIGHT: 162.75 LBS

## 2021-05-31 VITALS — BODY MASS INDEX: 27.31 KG/M2 | HEIGHT: 64 IN | WEIGHT: 160 LBS

## 2021-05-31 VITALS — BODY MASS INDEX: 27.96 KG/M2 | HEIGHT: 64 IN | WEIGHT: 163.75 LBS

## 2021-05-31 NOTE — PROGRESS NOTES
nexplanon removal    Pre-operative Diagnosis: desires alternate contraception due to the fact that this is making her gain weight    Post-operative Diagnosis: s/p nexplanon removal    Indications: desires alternate contraception    Procedure Details   The risks (including infection, bleeding, pain) and benefits of the procedure were explained to the patient and verbal informed consent was obtained.    Pt is right handed and has a Nexplanon in the left arm, so the left arm was prepped.  The area was numbed with lidocaine, and the nexplanon was removed in the usual fashion. steristrips were placed to ensure good wound approximation.   Patient tolerated procedure well.  No complications.  No ebl.        Condition:  Stable    Complications:  None    Plan:    The patient was advised to call for any problems. She was advised to use OTC analgesics as needed for mild to moderate pain.     IUD Insertion Procedure Note    Pre-operative Diagnosis: desires contraception    Post-operative Diagnosis: same    Indications: contraception    Procedure Details   Urine pregnancy test was not done.  The risks (including infection, bleeding, pain, and uterine perforation) and benefits of the procedure were explained to the patient and Written informed consent was obtained.      Cervix cleansed with Betadine. Uterus sounded to 6.5 cm. paraguard IUD inserted with use of a tenaculum over the anterior cervix without difficulty. String visible and trimmed. Patient tolerated procedure well.    IUD Information:  Lot: [id number] Exp: 4/30/2025 NDC: [address]-4.    Condition:  Stable    Complications:  None    Plan:    The patient was advised to call for any fever or for prolonged or severe pain or bleeding. She was advised to use otc pain meds as needed for mild to moderate pain.   Advised to avoid intercourse for 1 week.

## 2021-05-31 NOTE — TELEPHONE ENCOUNTER
Patient contacted with  and notified per MD note below. The patient verbalizes understanding of provider/CSS instructions for follow-up and continued care per provider message.

## 2021-05-31 NOTE — TELEPHONE ENCOUNTER
Please call the pt and let her know that it is normal for her to not get her menses with the nexplanon, especially as she is still breastfeeding.  She might get an irregular period once she is nursing less.    There can be some weight gain associated with the Nexplanon.  Often it is about 2 pounds per year.  If she is not exercising, walking regularly, eating a very healthy diet that is high in vegetables, low and rice, bread, pasta, cereal, juice, milk, sugars then she should start to do this regularly.  I recommend she take all of the simple carbohydrates out of her diet and instead eat vegetables, proteins, fruits.

## 2021-06-01 VITALS — WEIGHT: 178 LBS | HEIGHT: 64 IN | BODY MASS INDEX: 30.39 KG/M2

## 2021-06-01 VITALS — WEIGHT: 182 LBS | HEIGHT: 64 IN | BODY MASS INDEX: 31.07 KG/M2

## 2021-06-01 VITALS — WEIGHT: 175 LBS | HEIGHT: 64 IN | BODY MASS INDEX: 29.88 KG/M2

## 2021-06-01 VITALS — WEIGHT: 174.75 LBS | BODY MASS INDEX: 29.84 KG/M2 | HEIGHT: 64 IN

## 2021-06-01 VITALS — BODY MASS INDEX: 31.03 KG/M2 | HEIGHT: 64 IN | WEIGHT: 181.75 LBS

## 2021-06-01 VITALS — HEIGHT: 64 IN | WEIGHT: 180 LBS | BODY MASS INDEX: 30.73 KG/M2

## 2021-06-01 VITALS — WEIGHT: 176 LBS | BODY MASS INDEX: 30.05 KG/M2 | HEIGHT: 64 IN

## 2021-06-01 VITALS — HEIGHT: 64 IN | WEIGHT: 163 LBS | BODY MASS INDEX: 27.83 KG/M2

## 2021-06-01 VITALS — WEIGHT: 167 LBS | BODY MASS INDEX: 28.51 KG/M2 | HEIGHT: 64 IN

## 2021-06-01 NOTE — PROGRESS NOTES
"S:  Vernell Christensen is a 24 y.o. female who comes to the clinic today for  1.  iud check:  Doing well with IUD.  No problems.  No unusual vaginal discharge.  No unusual bleeding.  No pain or cramping.      2.  Vaccinations:  Due for influenza and HPV.      I reviewed the pertinent family, social, surgical, medical history.      O:  BP 94/74   Pulse 87   Temp 98  F (36.7  C) (Oral)   Resp 18   Ht 5' 3.5\" (1.613 m)   Wt 195 lb (88.5 kg)   SpO2 97%   BMI 34.00 kg/m    Gen;  Nad, alert  Genitourinary Exam:   Vulva: normal skin.  No lesions noted.  Nontender.    Urethral meatus: normal size and location, no lesions or discharge   Urethra: no tenderness or masses   Bladder: no fullness or tenderness   Vagina: normal appearance, physiologic discharge. No evidence of cystocele or rectocele.   Cervix: normal appearance, no lesions, iud strings visible at os    Uterus: normal size and position, mobile, non-tender   Pap smear obtained: no  Rectal exam: deferred         Patient Active Problem List   Diagnosis     UTI (urinary tract infection) during pregnancy     Cholestasis during pregnancy     Pregnant     Vaginal delivery     IUD (intrauterine device) in place     Current Outpatient Medications on File Prior to Visit   Medication Sig Dispense Refill     copper (PARAGARD T 380A) 380 square mm IUD IUD 1 each by Intrauterine route once. Lot: [id number]  Exp: 4/30/2025  NDC: [address]-0       docusate sodium (COLACE) 100 MG capsule Take 1 capsule (100 mg total) by mouth daily.  0     ibuprofen (ADVIL,MOTRIN) 800 MG tablet Take 1 tablet (800 mg total) by mouth every 8 (eight) hours. 60 tablet 0     ferrous sulfate 325 (65 FE) MG tablet Take 1 tablet (325 mg total) by mouth daily. 90 tablet 3     lanolin (LANSINOH HPA) 100 % Oint Use on nipples for nipple pain 50 g 0     PNV,calcium 72-iron-folic acid (PRENATAL PLUS, CALCIUM CARB,) 27 mg iron- 1 mg Tab Take 1 tablet by mouth daily.       No current facility-administered " medications on file prior to visit.           No results found for this or any previous visit (from the past 48 hour(s)).     No images are attached to the encounter or orders placed in the encounter.       Assessment/Plan:  1. Need for vaccination    - Influenza, Seasonal Quad, PF =/> 6months  - HPV vaccine 9 valent 3 dose IM    2.  iud check up  Stable.    Will let us know if any unusual sx.    Recheck in 1 year.  Needs a pap smear at that time.        Susan Bejarano   9/23/2019 4:02 PM

## 2021-06-02 VITALS — BODY MASS INDEX: 30.05 KG/M2 | HEIGHT: 64 IN | WEIGHT: 176 LBS

## 2021-06-03 VITALS
TEMPERATURE: 98 F | HEIGHT: 64 IN | HEART RATE: 87 BPM | DIASTOLIC BLOOD PRESSURE: 74 MMHG | RESPIRATION RATE: 18 BRPM | OXYGEN SATURATION: 97 % | WEIGHT: 195 LBS | BODY MASS INDEX: 33.29 KG/M2 | SYSTOLIC BLOOD PRESSURE: 94 MMHG

## 2021-06-03 VITALS — HEIGHT: 64 IN | BODY MASS INDEX: 32.78 KG/M2 | WEIGHT: 192 LBS

## 2021-06-04 VITALS
WEIGHT: 187.31 LBS | TEMPERATURE: 98.1 F | HEIGHT: 63 IN | HEART RATE: 82 BPM | RESPIRATION RATE: 20 BRPM | BODY MASS INDEX: 33.19 KG/M2 | SYSTOLIC BLOOD PRESSURE: 108 MMHG | DIASTOLIC BLOOD PRESSURE: 64 MMHG

## 2021-06-04 VITALS — HEIGHT: 63 IN | WEIGHT: 199 LBS | BODY MASS INDEX: 35.26 KG/M2

## 2021-06-04 VITALS
TEMPERATURE: 98.1 F | DIASTOLIC BLOOD PRESSURE: 56 MMHG | WEIGHT: 201.75 LBS | OXYGEN SATURATION: 99 % | HEART RATE: 102 BPM | SYSTOLIC BLOOD PRESSURE: 118 MMHG | BODY MASS INDEX: 35.75 KG/M2 | HEIGHT: 63 IN | RESPIRATION RATE: 24 BRPM

## 2021-06-04 VITALS
OXYGEN SATURATION: 97 % | RESPIRATION RATE: 16 BRPM | DIASTOLIC BLOOD PRESSURE: 68 MMHG | HEIGHT: 64 IN | BODY MASS INDEX: 33.83 KG/M2 | HEART RATE: 74 BPM | WEIGHT: 198.13 LBS | TEMPERATURE: 98 F | SYSTOLIC BLOOD PRESSURE: 112 MMHG

## 2021-06-04 VITALS — BODY MASS INDEX: 35.44 KG/M2 | WEIGHT: 200 LBS | HEIGHT: 63 IN

## 2021-06-04 VITALS — BODY MASS INDEX: 33.5 KG/M2 | WEIGHT: 189.1 LBS | HEIGHT: 63 IN

## 2021-06-05 VITALS — BODY MASS INDEX: 32.25 KG/M2 | HEIGHT: 63 IN | WEIGHT: 182 LBS

## 2021-06-05 VITALS
SYSTOLIC BLOOD PRESSURE: 110 MMHG | DIASTOLIC BLOOD PRESSURE: 70 MMHG | RESPIRATION RATE: 16 BRPM | OXYGEN SATURATION: 97 % | WEIGHT: 180.38 LBS | HEART RATE: 88 BPM | BODY MASS INDEX: 31.96 KG/M2 | HEIGHT: 63 IN | TEMPERATURE: 98.2 F

## 2021-06-05 VITALS
BODY MASS INDEX: 30.22 KG/M2 | WEIGHT: 177.04 LBS | SYSTOLIC BLOOD PRESSURE: 108 MMHG | TEMPERATURE: 98.8 F | DIASTOLIC BLOOD PRESSURE: 64 MMHG | HEIGHT: 64 IN | HEART RATE: 90 BPM | OXYGEN SATURATION: 98 %

## 2021-06-05 VITALS
HEART RATE: 82 BPM | SYSTOLIC BLOOD PRESSURE: 115 MMHG | TEMPERATURE: 99.5 F | OXYGEN SATURATION: 98 % | RESPIRATION RATE: 16 BRPM | WEIGHT: 187 LBS | DIASTOLIC BLOOD PRESSURE: 75 MMHG | BODY MASS INDEX: 33.13 KG/M2

## 2021-06-05 VITALS
TEMPERATURE: 98.2 F | RESPIRATION RATE: 18 BRPM | WEIGHT: 191.25 LBS | SYSTOLIC BLOOD PRESSURE: 106 MMHG | HEIGHT: 63 IN | HEART RATE: 82 BPM | DIASTOLIC BLOOD PRESSURE: 70 MMHG | BODY MASS INDEX: 33.89 KG/M2

## 2021-06-05 VITALS
RESPIRATION RATE: 16 BRPM | OXYGEN SATURATION: 99 % | SYSTOLIC BLOOD PRESSURE: 116 MMHG | HEART RATE: 75 BPM | TEMPERATURE: 98.6 F | BODY MASS INDEX: 30.76 KG/M2 | DIASTOLIC BLOOD PRESSURE: 78 MMHG | WEIGHT: 178 LBS

## 2021-06-05 VITALS
DIASTOLIC BLOOD PRESSURE: 66 MMHG | WEIGHT: 191 LBS | HEART RATE: 67 BPM | SYSTOLIC BLOOD PRESSURE: 107 MMHG | BODY MASS INDEX: 33.83 KG/M2 | TEMPERATURE: 98.8 F | RESPIRATION RATE: 14 BRPM | OXYGEN SATURATION: 98 %

## 2021-06-05 VITALS
HEART RATE: 84 BPM | WEIGHT: 189 LBS | BODY MASS INDEX: 33.49 KG/M2 | RESPIRATION RATE: 16 BRPM | SYSTOLIC BLOOD PRESSURE: 104 MMHG | DIASTOLIC BLOOD PRESSURE: 68 MMHG | TEMPERATURE: 98.4 F | HEIGHT: 63 IN

## 2021-06-05 VITALS — WEIGHT: 175 LBS | BODY MASS INDEX: 31 KG/M2

## 2021-06-05 NOTE — PROGRESS NOTES
"ASSESMENT AND PLAN:  Diagnoses and all orders for this visit:    Vaginal bleeding  -     Hemoglobin  -     Pregnancy, Urine  Pregnancy test even though this chance of pregnancy is very low, it was negative.  Hemoglobin is still pending at the time of dictation.    Breakthrough bleeding with IUD  -     naproxen (NAPROSYN) 500 MG tablet; Take 1 tablet (500 mg total) by mouth 2 (two) times a day with meals for 5 days.  Dispense: 20 tablet; Refill: 0  We will try NSAID for 5 days.  She will call if bleeding persist or worsen.    This transcription uses voice recognition software, which may contain typographical errors.        SUBJECTIVE: Vernell Christensen is a very pleasant 24-year-old female here with complaint of vaginal bleeding for 9 days.  The bleeding is heavy at times, lighter today.  She changed her pad 6 times yesterday, 2 so far today.  No cramping.  No dysuria or frequency.  She had IUD placed in 8/19.  No bleeding, spotting or menstrual periods after IUD placement until 9 days ago.    No past medical history on file.  Patient Active Problem List   Diagnosis     UTI (urinary tract infection) during pregnancy     Cholestasis during pregnancy     Pregnant     Vaginal delivery     IUD (intrauterine device) in place       Allergies:  No Known Allergies    Social History     Tobacco Use   Smoking Status Never Smoker   Smokeless Tobacco Never Used       Review of systems otherwise negative except as listed in HPI.   Social History     Tobacco Use   Smoking Status Never Smoker   Smokeless Tobacco Never Used       OBJECTICE: /68 (Patient Site: Right Arm, Patient Position: Sitting, Cuff Size: Adult Regular)   Pulse 74   Temp 98  F (36.7  C) (Oral)   Resp 16   Ht 5' 3.5\" (1.613 m)   Wt 198 lb 2 oz (89.9 kg)   LMP 01/28/2020 (Exact Date)   SpO2 97%   BMI 34.55 kg/m      DATA REVIEWED:    Labs Reviewed or Ordered (1):      GEN-alert,  in no apparent distress.  ABDOMEN- Soft , not tender.  PELVIC-normal external " pelvic exam.  Unable to visualize cervix due to bleeding but able to palpate IUD string.  No tenderness on bimanual exam.  SKIN-normal        [facility]   2/6/2020

## 2021-06-05 NOTE — TELEPHONE ENCOUNTER
Via Turkish speaking  - pt concerned about menstrual bleeding on IUD.   Pt states that since the ParaGard IUD was placed in 8/2019, she has not experienced any menstrual bleeding.   Menstrual bleeding started on Tuesday, January 28th (>1week) and has not stopped  Pt states bleeding is heavy  No Pain  Have gone through 4 pads today, have been soaking more than 6 pads per day      Reason for Disposition    [1] Periods with > 6 soaked pads or tampons per day AND [2] last > 7 days    Protocols used: CONTRACEPTION - IUD SYMPTOMS AND [id number]    Care Disposition: See PCP within 3 days. Care advice given per guideline. Pt verbalizes understanding. OV scheduled for 2/6/2020.      Davina Penn RN Triage Nurse Advisor 3:24 PM

## 2021-06-06 NOTE — PROGRESS NOTES
"S:  Vernell Christensen is a 24 y.o. female who comes to the clinic today for  1.  Weight gain:  She is worried about rapid weight gain.    She is having cereal in the am with milk.  Then lunch is pasta with meat or rice with meat.  She has a few vegetables.  She doesn't really like vegetables.  She is eating less tortillas.  She is skipping some meals with tortillas.  She is drinking occasional pop.  Otherwise just water.  She is not doing any formal exercise.  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    She recently had a period of time where she was bleeding profusely for 9 days with her current paraguard iud.  Prior to this she has a history of irregular menses.    Her bleeding has since resolved    Ros:  No constipation o no chair changes.  No excessively dry skin.  No further leaking of milk from her breast.  No vision changes.  No chronic headaches.        Family history is positive for mom With some type of thyroid problem.  She is not certain if it is hypo-or hyper thyroid.  No family or personal history of infertility or PCOS that she is aware of.   There is a very strong family history of diabetes.    I reviewed the pertinent family, social, surgical, medical history.      O:  /56   Pulse (!) 102   Temp 98.1  F (36.7  C) (Oral)   Resp 24   Ht 5' 3.25\" (1.607 m)   Wt 201 lb 12 oz (91.5 kg)   SpO2 99%   BMI 35.46 kg/m    Gen: no acute distress  Neck:  Supple, no lad, no thyromegaly or nodules.    Heart:  Regular rate and rhythm.  No m/r/g.  When she arrived she was tachycardic however at the time of my exam her tachycardia had resolved  Lungs: cta bilaterally, no wheezes or rhonchi.  Good air inspiration  Abdomen:  No masses or organomegaly, soft.  Non tender.  Non distended.    Extremities:  No edema.     Acanthosis nigricans noted over the back of her neck.  She also says she has some mild acanthosis in her axillae.          Patient Active Problem List   Diagnosis     UTI (urinary " tract infection) during pregnancy     Cholestasis during pregnancy     Pregnant     Vaginal delivery     IUD (intrauterine device) in place     Current Outpatient Medications on File Prior to Visit   Medication Sig Dispense Refill     copper (PARAGARD T 380A) 380 square mm IUD IUD 1 each by Intrauterine route once. Lot: [id number]  Exp: 4/30/2025  NDC: [address]-3       docusate sodium (COLACE) 100 MG capsule Take 1 capsule (100 mg total) by mouth daily.  0     ibuprofen (ADVIL,MOTRIN) 800 MG tablet Take 1 tablet (800 mg total) by mouth every 8 (eight) hours. 60 tablet 0     [DISCONTINUED] ferrous sulfate 325 (65 FE) MG tablet Take 1 tablet (325 mg total) by mouth daily. 90 tablet 3     [DISCONTINUED] lanolin (LANSINOH HPA) 100 % Oint Use on nipples for nipple pain 50 g 0     [DISCONTINUED] PNV,calcium 72-iron-folic acid (PRENATAL PLUS, CALCIUM CARB,) 27 mg iron- 1 mg Tab Take 1 tablet by mouth daily.       No current facility-administered medications on file prior to visit.           No results found for this or any previous visit (from the past 48 hour(s)).     No images are attached to the encounter or orders placed in the encounter.       Assessment/Plan:  1. Irregular menses  Prolactin    Testosterone, free, total    Hemoglobin A1c    DHEA-sulfate    Androstenedione    Thyroid Cascade    Prolactin    Testosterone, free, total    Hemoglobin A1c    DHEA-sulfate    Androstenedione   2. Weight gain  Prolactin    Testosterone, free, total    Hemoglobin A1c    DHEA-sulfate    Androstenedione    Thyroid [place]    Amb Referral to Bariatric Dietician    Prolactin    Testosterone, free, total    Hemoglobin A1c    DHEA-sulfate    Androstenedione    Ambulatory referral to Bariatric Care: Surgical and Non-Surgical   3. Overweight  Amb Referral to Bariatric Dietician    Ambulatory referral to Bariatric Care: Surgical and Non-Surgical   4. Acanthosis nigricans       Encouraged her to remove most simple carbohydrates from her  diet, we talked about increasing her vegetables and her protein intake.    I commended her on the diet changes that she has already made.  I will refer her to the bariatric surgery center for nutrition counseling.  Rule out PCOS.  I did discuss metformin with her as an option to help her control her weight, Given her rapid weight gain, irregular menses, acanthosis.        Susan Bejarano   3/10/2020 2:46 PM

## 2021-06-06 NOTE — TELEPHONE ENCOUNTER
Patient Returning Call  Reason for call:  Return call with  ID#[id number]  Information relayed to patient:  Caller was informed of message below.  Patient has additional questions:  No  If YES, what are your questions/concerns:  n.a  Okay to leave a detailed message?: No call back needed

## 2021-06-06 NOTE — TELEPHONE ENCOUNTER
Called pt and left VM to return call about results.  Ok to relay when pt calls back. Thanks.         ----- Message from Susan Bejarano MD sent at 3/12/2020  5:16 PM CDT -----  Please call pt with an  and let them know that their lab results show an elevated A1c and prediabetes.  It is very important that she make some of the diet changes that we discussed.  I would also like her to start on metformin which is a diabetes drug.  This should help her with some of her weight loss with her diet changes as well as assist her to help delay the onset of diabetes.  She should start taking 500 mg in the morning for 3 to 5 days and then take it twice daily once in the morning and once at night.  If she develops significant diarrhea from this, then she should try taking it with food . If the diarrhea persists, then she should let me know and stop the meds.

## 2021-06-08 NOTE — PROGRESS NOTES
"Vernell Christensen is a 25 y.o. female who is being evaluated via a billable video visit.      The patient has been notified of following:     \"This video visit will be conducted via a call between you and your physician/provider. We have found that certain health care needs can be provided without the need for an in-person physical exam.  This service lets us provide the care you need with a video conversation.  If a prescription is necessary we can send it directly to your pharmacy.  If lab work is needed we can place an order for that and you can then stop by our lab to have the test done at a later time.    Video visits are billed at different rates depending on your insurance coverage. Please reach out to your insurance provider with any questions.    If during the course of the call the physician/provider feels a video visit is not appropriate, you will not be charged for this service.\"    Patient has given verbal consent to a Video visit? Yes    Will anyone else be joining your video visit? No      Video Start Time: 1:37 PM    Additional provider notes:       ASSESSMENT AND PLAN:     We spent 26 minutes today in direct patient contact via video conference,w 100% of the time in consultation concerning medical problems as listed below. Phone visit done due to covid 19 crisis.     Problem List Items Addressed This Visit        Unprioritized    IUD (intrauterine device) in place     She uses iud for contraception.         Class 2 obesity     Patient is interested in weight loss.  Her BMI is 35 today.  We discussed starting formal medical weight loss management.  She is interested in doing this.  She cannot afford 3 pack of health coaching or 24-week program at this time.  That she just like to begin with dietitian and doctor visits.  The plan is to get some preliminary labs and schedule a weight loss intake formally with questionnaire.  The question will need to be translated into Romanian for her.           Other Visit " Diagnoses     Screening for metabolic disorder    -  Primary    Relevant Orders    Comprehensive Metabolic Panel    Screening, anemia, deficiency, iron        Relevant Orders    HM1(CBC and Differential)    Screening, lipid        Relevant Orders    Lipid Cascade    Screening for thyroid disorder        Screening for endocrine, nutritional, metabolic and immunity disorder        Relevant Orders    Vitamin D, Total (25-Hydroxy)    Screening for diabetes mellitus        Relevant Orders    Glycosylated Hemoglobin A1c           Chief Complaint   Patient presents with     Weight Loss     Initial weight loss visit, discuss results from 3/2020        HPI  Vernell Christensen is a 25 y.o. female who presents today with an  for a weight loss clinic visit.  Unfortunately she was not given the weight loss intake questionnaire.  In addition the  was unable to join us immediately and so the visit got up to a late start.  She is interested in using medications for weight loss.  She says she thinks Nexplanon caused her weight gain.    Social History     Tobacco Use   Smoking Status Never Smoker   Smokeless Tobacco Never Used      Current Outpatient Medications on File Prior to Visit   Medication Sig Dispense Refill     copper (PARAGARD T 380A) 380 square mm IUD IUD 1 each by Intrauterine route once. Lot: [id number]  Exp: 4/30/2025  NDC: [address]-2       ibuprofen (ADVIL,MOTRIN) 800 MG tablet Take 1 tablet (800 mg total) by mouth every 8 (eight) hours. 60 tablet 0     metFORMIN (GLUCOPHAGE) 500 MG tablet Take 1 tablet (500 mg total) by mouth 2 (two) times a day with meals. 180 tablet 3     No current facility-administered medications on file prior to visit.       No Known Allergies    Review of Systems   Constitutional: Negative.    HENT: Negative.    Eyes: Negative.    Respiratory: Negative.    Cardiovascular: Negative.    Gastrointestinal: Negative.    Endocrine: Negative.    Genitourinary: Negative.   "  Musculoskeletal: Negative.    Skin: Negative.    Neurological: Negative.    Hematological: Negative.    Psychiatric/Behavioral: Negative.         OBJECTIVE: Ht 5' 3\" (1.6 m) Comment: pt reported  Wt 199 lb (90.3 kg) Comment: pt reported  BMI 35.25 kg/m     Physical Exam  Constitutional:       General: She is not in acute distress.     Appearance: She is well-developed.   HENT:      Head: Normocephalic and atraumatic.   Eyes:      Conjunctiva/sclera: Conjunctivae normal.   Neck:      Musculoskeletal: Neck supple.   Pulmonary:      Effort: Pulmonary effort is normal.   Musculoskeletal: Normal range of motion.   Neurological:      Mental Status: She is alert and oriented to person, place, and time.          Additional History from Old Records Summarized (2): no  Decision to Obtain Records (1): no  Radiology Tests Summarized or Ordered (1): no  Labs Reviewed or Ordered (1): yes  Medicine Test Summarized or Ordered (1): no  Independent Review of EKG or X-RAY(2 each): no    This note was created using Dragon dictation.  Please excuse any grammatical errors.       Video-Visit Details    Type of service:  Video Visit    Video End Time (time video stopped): 2:05pm  Originating Location (pt. Location): Home    Distant Location (provider location):  [facility]     Platform used for Video Visit: Raffy Lucero MD    "

## 2021-06-08 NOTE — PATIENT INSTRUCTIONS - HE
Return in about 2 weeks (around 7/1/2020) for full weight loss intake w  and follow up for lab results (needs blood drawn before this).

## 2021-06-09 ENCOUNTER — AMBULATORY - HEALTHEAST (OUTPATIENT)
Dept: LAB | Facility: CLINIC | Age: 26
End: 2021-06-09

## 2021-06-09 ENCOUNTER — OFFICE VISIT - HEALTHEAST (OUTPATIENT)
Dept: FAMILY MEDICINE | Facility: CLINIC | Age: 26
End: 2021-06-09

## 2021-06-09 DIAGNOSIS — Z20.822 EXPOSURE TO COVID-19 VIRUS: ICD-10-CM

## 2021-06-09 NOTE — TELEPHONE ENCOUNTER
Reason for Disposition    [1] COVID-19 infection suspected by caller or triager AND [2] mild symptoms (cough, fever, or others) AND [3] no complications or SOB    Additional Information    Negative: SEVERE difficulty breathing (e.g., struggling for each breath, speaks in single words)    Negative: Difficult to awaken or acting confused (e.g., disoriented, slurred speech)    Negative: Bluish (or gray) lips or face now    Negative: Shock suspected (e.g., cold/pale/clammy skin, too weak to stand, low BP, rapid pulse)    Negative: Sounds like a life-threatening emergency to the triager    Negative: [1] COVID-19 exposure AND [2] no symptoms    Negative: COVID-19 and Breastfeeding, questions about    Negative: [1] Adult with possible COVID-19 symptoms AND [2] triager concerned about severity of symptoms or other causes    Negative: SEVERE or constant chest pain or pressure (Exception: mild central chest pain, present only when coughing)    Negative: MODERATE difficulty breathing (e.g., speaks in phrases, SOB even at rest, pulse 100-120)    Negative: Patient sounds very sick or weak to the triager    Negative: MILD difficulty breathing (e.g., minimal/no SOB at rest, SOB with walking, pulse <100)    Negative: Chest pain or pressure    Negative: Fever > 103 F (39.4 C)    Negative: [1] Fever > 101 F (38.3 C) AND [2] age > 60    Negative: [1] Fever > 100.0 F (37.8 C) AND [2] bedridden (e.g., nursing home patient, CVA, chronic illness, recovering from surgery)    Negative: HIGH RISK patient (e.g., age > 64 years, diabetes, heart or lung disease, weak immune system)    Negative: Fever present > 3 days (72 hours)    Negative: [1] Fever returns after gone for over 24 hours AND [2] symptoms worse or not improved    Negative: [1] Continuous (nonstop) coughing interferes with work or school AND [2] no improvement using cough treatment per protocol    Protocols used: CORONAVIRUS (COVID-19) DIAGNOSED OR [id number]-PO 5.16.20

## 2021-06-09 NOTE — PROGRESS NOTES
"Vernell Christensen is a 25 y.o. female who is being evaluated via a billable telephone visit.      The patient has been notified of following:     \"This telephone visit will be conducted via a call between you and your physician/provider. We have found that certain health care needs can be provided without the need for a physical exam.  This service lets us provide the care you need with a short phone conversation.  If a prescription is necessary we can send it directly to your pharmacy.  If lab work is needed we can place an order for that and you can then stop by our lab to have the test done at a later time.    Telephone visits are billed at different rates depending on your insurance coverage. During this emergency period, for some insurers they may be billed the same as an in-person visit.  Please reach out to your insurance provider with any questions.    If during the course of the call the physician/provider feels a telephone visit is not appropriate, you will not be charged for this service.\"    Patient has given verbal consent to a Telephone visit? Yes    What phone number would you like to be contacted at? [phone]    Patient would like to receive their AVS by AVS Preference: E-Mail (Inform patient AVS not encrypted with this option).    Additional provider notes:   [facility]:  Non-Surgical Weight Loss Intake Appointment   Date of visit: 7/22/2020  Physician: Mason Mayfield MD  Primary Care is Susan Bejarano MD.  Vernell Christensen   25 y.o.  female  Vernell is a 25 y.o. year old female who is here today for consultation regarding non-surgical weight loss.   she was referred to the [facility] by her PCP.    Weight History:   Wt Readings from Last 3 Encounters:   07/22/20 200 lb (90.7 kg)   06/17/20 199 lb (90.3 kg)   03/10/20 201 lb 12 oz (91.5 kg)     Body mass index is 35.43 kg/m .       Assessment and Plan   Assessment: Vernell Christensen is a 25 y.o.,female " presenting for assistance with medical weight loss.  Identified issues contributing to her excess weight include:     In the past her Nexplanon contributed to 15-20kg weight gain both before 2nd and 3rd pregnancies and since her youngest was born she's had nexplanon removed and has copper IUD to reduce risk of progesterone effects on appetite. She's recently started metformin for some insulin resistance with improvement in A1c from 6% to 5.8% on chart review. Continued weight reduction should reduce her risks for metabolic disease going forward.   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 She brings meals and avoids eating the donuts/baked goods ([employer]). She's walking regularly and has many good habits with a basketball and dance history in her youth.     She's open to a trial of weight loss medication and we'll try half tablet phentermine to start and consider Saxenda/Victoza if extra support needed given her insulin resistance history.    She's already supplementing her low vitamin D (7.6 in June). Recheck reasonable in 3-4 months and if persistently low we can consider boosting with weekly D3 50,000IUs.  Given her metformin use she's at some risk for B12 deficiency so we'll look into this with her lab draw. TSH was normal in March at 1.63. Triglycerides will benefit from high lean protein/healthy fat diet and minimize simple starches (in the past soda/rice/pasta were common in excess).           Plan:  1.  Behavior Goals: 3 daily meals plan, ideally with a large breakfast, medium lunch        and smaller dinner. Avoidance of sugared-sweetened beverages and processed        carbohydrate snacks.  Work towards pre-planning meals to avoid falling back into old             habits/obesogenic habits.  Daily Food Tracking and morning weight recommended.  Weekly       check-in through [employer] to increase compliance.    2.  Diet Goals: Recommend a 1386-1987 Calorie  daily restriction.  Increased protein intake to insure at least 20 grams of protein per meal.  Will follow up with dietician. Continued good meal intake and likely needs mid afternoon, small protein snack given 7 hours often between lunch and supper. Limit rice to half a cup of cooked rice daily if an important part of diet, brown rice encouraged rather than white rice.    3.  Exercise/Activity Goals: continue walking and look to add in 2-3 days weekly of dance or strength work..  Long term goal of increasing endurance to allow for at least  200 minutes weekly of moderate exercise to maintain weight loss.      4.  Recommendation regarding weight loss medication:  Trial of phentermine:  Half a tablet, 18.75mg daily if tolerated within 3 hours of waking up. Stop if over stimulation/anxiety/mood swings/headaches/racing heart beats. We'll consider liraglutide in the future if struggling to find success. Should continue D3 5000IUs daily (125mcg).     5.  Referral to Dietician for further education and customization of diet plan.    6.  Stress Reduction: walking encouraged and hitting exercise targets    7.  Intake Labs:  Reviewed 6/25/20 studies. Will place order for B12 check that she can do at her convenience.  March Prolactin levels normal. CMP normal. HDL should improve w/ increased intensity of exercise/diet change.    8.   Vitamin D deficiency: continue replacement, recheck level in Oct/Nov.    9. Insulin resistance: continue metformin. Weight loss should improve further.    10. Hypertriglyceridemia: chart review shows level of 246mg/dl in June.  diet change/weight loss should improve. We'll consider fish oil therapy if struggling.    There are no diagnoses linked to this encounter.     No follow-ups on file.     Weight and Lifestyle History    Wakes around 5:30am, work at 6am. Eats at 8am (brings meal of oatmeal/fruit/nuts), Lunch at noon (leftovers meat/pasta/rice/veggies. 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  Takes care of kids in afternoon, may go for walk. Supper at 7pm is sandwich/pasta/[employer].      STOP-BANG score for sleep apnea : 2, ESS of 2. Will defer sleep referral as low risk.  For general population   WILIAM - Low Risk : Yes to 0 - 2 questions  WILIAM - Intermediate Risk : Yes to 3 - 4 questions  WILIAM - High Risk : Yes to 5 - 8 questions  or Yes to 2 or more of 4 STOP questions + male gender  or Yes to 2 or more of 4 STOP questions + BMI > 35kg/m2  or Yes to 2 or more of 4 STOP questions + neck circumference 17 inches / 43cm in male or 16 inches / 41cm in female    Weight History:  In what way is your excess weight affecting your wellness/health? Not feeling as well  Heaviest weight: near current levels. In the past fluctuated 160-180 on chart review  Any Previous weight loss, what was the most lost and method: self guided  Birth weIight, High School graduation weight: near 150 lbs  Lowest adult weight: 160 in chart  Maternal health/smoking/weight: n/a  When did you start gaining weight and what were the circumstances? nexplanon  Is anyone else in your immediate family overweight? n/a  Finally,  Is there a weight you desire to be, what is your goal weight that would define successful weight loss for you? Closer to 150  lbs   I would like to lose weight so I can  :  Feel better   .     Barriers to weight loss:  Is anyone else at home/work/family a barrier to your weight loss? no  Work schedule/location? See above  Current stressors include: small children (3 kids, youngest is 2).   Mobility problems: no    Counseled on health benefits of weight loss, goals for metabolic/diabetic risk reduction, HTN reduction, improved sleep and mobility, cancer reduction, longevity.    Fitness History:  Were you ever an athlete?yes     Which sports? Basketball/dance  When was the last time you walked/hiked a mile?regularly walking 50minutes most days  Do you have any limitations for activity?no  Do you have access to a gym, pool,  "club, fitness center, or ?no                Do you have any fitness goals/dreams that could motivate your weight loss?no    On a scale from 0-10,  how willing are you to start some sort of movement/exercise regimen? n/a    Counseled on physiologic benefits of exercise and for long term weight maintenance, goal working towards 200-300 minutes weekly.     Food History:  Who buys groceries?  She does  Do you eat at dinner table, is the TV on or off, family present? yes  Any soda, how much? Formerly 2 cans daily  Meals per day? 3  Snacks per day? 1-2  Breakfast typically is: see above  Lunch typically is: see above  Dinner  is: see above  Weekly Fast Food/dining out: 1 or less  Snacks consist of: fruit/nuts/    Food sensitivities/allergies/intolerances/avoidances: \"many foods\". No lentils, no yogurt, no jello. Picky about vegetables.  Water per day? About 2 liters    Any binging behavior?no  Any induced vomiting/purging? no  Any history of anorexia/bulimia? no  Any night eating issues? no  Stress induced eating? no    Goal Setting:  Short Term Goals 10% reduction is 20 lbs, 180 lbs  Long Term Goals stabilizing weight w/ BMI under 30, resolution of insulin resistance and improved lipids.         Patient Profile   Social History     Social History Narrative    6/17/20  with 4 children. French speaking     Family History   Problem Relation Age of Onset     Hypertension Maternal Grandmother      Diabetes Maternal Grandmother      Diabetes Maternal Grandfather      Diabetes Paternal Grandmother      Diabetes Maternal Aunt      Diabetes Maternal Aunt           Past Medical History   Patient Active Problem List   Diagnosis     Cholestasis during pregnancy     IUD (intrauterine device) in place     Class 2 obesity     Patient has no known allergies.  Current Outpatient Medications   Medication Sig     cholecalciferol, vitamin D3, 125 mcg (5,000 unit) capsule Take 1 capsule (5,000 Units total) by mouth daily.     " "copper (PARAGARD T 380A) 380 square mm IUD IUD 1 each by Intrauterine route once. Lot: [id number]  Exp: 4/30/2025  NDC: [address]-0     ibuprofen (ADVIL,MOTRIN) 800 MG tablet Take 1 tablet (800 mg total) by mouth every 8 (eight) hours.     metFORMIN (GLUCOPHAGE) 500 MG tablet Take 1 tablet (500 mg total) by mouth 2 (two) times a day with meals.       Past Surgical History  She has a past surgical history that includes No past surgeries.     Examination   Ht 5' 3\" (1.6 m)   Wt 200 lb (90.7 kg)   BMI 35.43 kg/m    Height: 5' 3\" (1.6 m) (7/22/2020  2:00 PM)  Initial Weight: 200 lbs (7/22/2020  2:00 PM)  Weight: 200 lb (90.7 kg) (7/22/2020  2:00 PM)  Weight loss from initial: 0 (7/22/2020  2:00 PM)  % Weight loss: 0 % (7/22/2020  2:00 PM)  BMI (Calculated): 35.4 (7/22/2020  2:00 PM)  SpO2: 99 % (3/10/2020  2:02 PM)    General:  Alert,  on phone during interview. Normal speech in native language,screaming child through most of interview. No cough.                                     LABS: \"Reviewed    Last recorded labs include:  Lab Results   Component Value Date    WBC 10.0 06/25/2020    HGB 13.0 06/25/2020    HCT 38.6 06/25/2020    MCV 76 (L) 06/25/2020     06/25/2020      Lab Results   Component Value Date    DOFEJZCV82VW 7.6 (L) 06/25/2020    Lab Results   Component Value Date    HGBA1C 5.8 06/25/2020      Lab Results   Component Value Date    CHOL 157 06/25/2020    No results found for: PTH      No results found for: FERRITIN   Lab Results   Component Value Date    HDL 42 (L) 06/25/2020      No results found for: QNLETQZZ50 No results found for: 85976   Lab Results   Component Value Date    LDLCALC 66 06/25/2020    Lab Results   Component Value Date    TSH 1.63 03/10/2020    No results found for: FOLATE   Lab Results   Component Value Date    TRIG 246 (H) 06/25/2020    Lab Results   Component Value Date    ALT 36 06/25/2020    AST 28 06/25/2020    ALKPHOS 131 (H) 06/25/2020    BILITOT 0.3 " 2020    No results found for: TESTOSTERONE     No components found for: CHOLHDL No results found for: 7597   @resusfast(vitamin a: 1)@       Other Notables/imaging:     Counselin minutes spent in phone consultation with the patient regarding conditions contributing to excess weight accumulation, with over 50% of the time spent in counseling, goal setting and initiating a plan to lose their excess weight. 15 minutes of charting/coordinating care.    Mason Mayfield MD  [facility].  2020           Phone call duration: 30 minutes    Evan Flores, [facility]

## 2021-06-09 NOTE — PATIENT INSTRUCTIONS - HE
Plan:  1.  Behavior Goals: 3 daily meals plan, ideally with a large breakfast, medium lunch        and smaller dinner. Avoidance of sugared-sweetened beverages and processed        carbohydrate snacks.  Work towards pre-planning meals to avoid falling back into old             habits/obesogenic habits.  Daily Food Tracking and morning weight recommended.     2.  Diet Goals: Recommend a 9831-0871 Calorie daily restriction.  Increased protein intake to insure at least 20 grams of protein per meal.  Will follow up with dietician. Continued good meal intake and likely needs mid afternoon, small protein snack given 7 hours often between lunch and supper. Limit rice to half a cup of cooked rice daily if an important part of diet, brown rice encouraged rather than white rice.    3.  Exercise/Activity Goals: continue walking and look to add in 2-3 days weekly of dance or strength work..  Long term goal of increasing endurance to allow for at least  200 minutes weekly of moderate exercise to maintain weight loss.      4.  Recommendation regarding weight loss medication:  Trial of phentermine:  Half a tablet, 18.75mg daily if tolerated within 3 hours of waking up. Stop if over stimulation/anxiety/mood swings/headaches/racing heart beats. We'll consider liraglutide in the future if struggling to find success. Should continue D3 5000IUs daily (125mcg).     5.  Referral to Dietician for further education and customization of diet plan.    6.  Stress Reduction: walking encouraged and hitting exercise targets    7.  Intake Labs:  Reviewed 6/25/20 studies. Will place order for B12 check that she can do at her convenience.  March Prolactin levels normal. CMP normal. HDL should improve w/ increased intensity of exercise/diet change.    8.   Vitamin D deficiency: continue replacement, recheck level in Oct/Nov.    9. Insulin resistance: continue metformin. Weight loss should improve further.    10. Hypertriglyceridemia: chart review  shows level of 246mg/dl in June.  diet change/weight loss should improve. We'll consider fish oil therapy if struggling.        To help lose weight in a safe way and sustainable way, I'd like to start you on a 1300 calorie diet each day.  Understanding that every 3500 calorie deficit adds up to a pound of weight reduction, with the combination of light aerobic exercise, some modest weight training and diet, we should be able to lose around 1 to 2.5lbs weekly depending on your starting height and weight and exercise routine with this goal intake.    Hunger and fatigue are the enemies of weight loss and behavior change in general.  We become much more reactive in our eating when we're hungry and tired and decrease our levels of self control.  It's not a personal failing, it's just body chemistry.   We can combat this by trying to avoid being tired and hungry at the same time.  To help this,  try to front load your calories in the first 10 hours of you day so you get into the fatigued evening hours reasonably full and you can control impulses/mindless eating a lot better and avoid those bedtime snacks/evening treats that the tired brain craves.  If you can getting your exercise in the beginning of your day has also been shown to have superior results (but anytime is better than none).    Weight loss goes through ups and downs and plateaus but if you stay on the program, you will enjoy success.   Commit to the process, try to be good at least 19 days out of 20 and continue to think about why you're doing this and what you're working towards. If you haven't thought of your reward for hitting your weight loss goals, think about it now. Using these little victory bribes along the way helps a lot.    Finding a diet that is satisfying and repeatable-- day in and day out, improves success.  An outline of how to break up the day's food is given below.  It is a starting point and example of what a 1300 calorie diet looks like.   You can modify the listed foods to suite your particular tastes, but pay attention to portions and protein content.   Do not skip breakfast on this plan as it will leave you hungry and lead to overeating at some point during the rest of the day.  If you can make supper the last food for the day more days of the week then not, it will help a lot.  That means that the intake during those first 10-12 hours of the day and hitting your protein/intake targets for all three meals is vital to your success and evening hunger.    Start reading labels so you know that you're getting what you think you are and start measuring foods so you can eventually look at a portion and understand how it will provide the fuel you want.    Prepping raw veggies after you buy them (washing and putting into bags/tupperware for easy access), cooking several chicken breasts/proteins for the next 2-3 lunches and generally being able to grab and go what will keep you on target when time is short will greatly aid your success.  Prepare and plan and success will follow.    Read labels:  for protein portions/yogurt, protein bars etc looking for items with more than 10 grams of protein and less than 10 grams of sugar is very helpful.  Frozen meals should have at least 18 grams of protein, under 10 grams of sugar as well (typically around 300-380 calories).    Please note: if you've had previous bariatric surgery: wait 20-30 minutes before/after eating to drink your beverages to avoid early fullness/dumping syndrome/worsening malabsorption, early loss of fullness and hunger.  Start with eating your protein first, slow down your meals and chew thoroughly.          1300 calorie diet:  Think Big Breakfast, Medium Lunch, smaller dinner.    Breakfast goal of 300 calories-350 calories (egg, 1/3 to 1/2 cup cooked old fashioned oatmeal or steel cut oats and berries,3-4oz greek yogurt.)Glass of water and if you like coffee (black) or tea.        Mid morning  Snack or part of lunch, about 2-2.5 hrs later: 100 calories (cottage cheese/string cheese/ fruit or banana) and a handful of cruchy/green veggies (cucumber/celery/green peppers/broccoli).  Small glass of water    Lunch:  300 calories (3.5-4 oz tuna with little mcconnell (no bread), apple, salad with drizzle of olive oil/balsamic vinegar for example)  Water    Snack:  100 calories.  4-5 oz Greek Yogurt with at least 17 grams of protein per serving.    Or Cottage cheese (lower sodium version preferable). Get this in about 2 hrs before you plan to have dinner. Protein drinks with at least 15-20 grams of protein and less than 6 grams of sugar could be used here to hit protein goals and decrease afternoon/evening hunger.  Glass of water    Dinner:  350 calories (4 oz meat or fish with cooked veggies or salad with minimal dressing, one piece of bread).  Glass of water or unsweetened tea with lemon  Dessert: 100 calories Medium Apple or Small handful of nuts, about 2/3 of an ounce (almonds/walnuts/cashews or pistachios are ideal).   Glass of water.    Try to avoid all soda and juice (low sodium V8 ok once a day).   You can do it!    Choose an activity that is fun/interesting and available to you such as  Going for a swim for 15 minutes, walking 40 minutes, elliptical 20 minutes or cycling 20 minutes as many days a week as you can.  If those times are too long for your fitness level, start at 10 minutes of movement and each week try to increase by 2 minutes each week.  The first 70 minutes a week (10 minutes a day only) of exercise drops the mortality rate of a sedentary person 30%!!!!  Our goal is to work up to 150 minutes or more per week of moderate to vigorous exercise  to optimize metabolism and prevent weight regain during maintenance. If time is short and your fitness allows it, high intensity interval training can be a nice way to cut the workout time in half:  warm up 2-4 minutes then 3-6 intervals of increased  "intensity effort (70% of max heart rate) followed by an equal amount or more of recovery before repeating, then 1-3 minutes of cooldown.     10 minutes of weight lifting can be helpful as well or using some body weight exercises like wall squats, pushups (with assistance as needed or standing/wall pushups), seat presses, yoga moves. At least twice weekly helps maintain a good strength to weight ratio, more days is better.  [employer] is a great resource for free video demonstrations.    If you are into strenuous weight lifting or prolonged exercise, use an online calculator for how many calories you've burned and if your exercise is lasting over 60 minutes, replace 20-30% of those calories burned immediately after exercise .  For the more limited exercise (less than 500 calories burned), there is no need to add extra food unless you notice a lot of hunger on your food journal.  Usually  Even a  calorie load after longer workouts is more than adequate.  For longer efforts, hunger will increase if you don't refuel afterwards and can get meal plan off track due to hunger, so replenish immediately after the workout to keep on the diet plan and feeling good.    Exercise example:  If you burned 1000 calories during the exercise, immediately (within 30 minutes) have a snack/replacement beverage totaling 200-300 calories and ideally have a 3:1 ratio of carbohydrate grams to protein grams to keep muscles ready for exercise the next day.  Have your next, full meal within 2-3 hours of exercise.    Tips for success:  KEEP A FOOD JOURNAL and a log of daily weights.  Pencil and paper works fine for most. Otherwise, [employer]nesspal, fitToppr, food.de, [employer], Re[employer] are all good tracker apps/programs or websites for food/fitness.  Remembering to ask yourself, \"How did my nourishment affect me today\" and comparing \"good days\" to \"hungry days\" to solidify what helps your body, in your life, feel it's best while losing weight.    1.  " "Prepare proteins ahead of time (broil chicken breasts in bulk so you can grab and go), steel cut oats can be stored in casserole dish/bowl in the fridge for quick scoop in the morning and rewarm in microwave, make use of crock pot recipes (watch salt content).    2.  Drink a 8-12 oz glass of water every 2-3 hours when awake.  We often mistake hunger for thirst, especially when losing weight.    3. Remember your Reward and Motivation when things get hard.    4.  Weigh yourself every morning and record, you'll stay on track better and learn how your body loses weight. Don't worry about 1 or 2 day patterns, but when on track you'll notice good trend downward of weight over 3-4 day segments.    5. Call or use [employer] messaging if problems/concerns .    6.  Find a handful of meals/foods that keep you on track and get into a boring routine that is sustainable for you.    7.  Take a complete multivitamin just to make sure all micronutrients are adequate during weight loss.    8. If losing hair/brittle nails it often means you are not taking enough protein.  Minimum goal is 60 grams daily of protein, most people with normal kidneys do well with upwards of 100-120 grams/day of protein. Consider taking Biotin as supplement or a \"Hair and Nail\" multivitamin.  If you are hypothyroid and losing hair, see you doctor for a check up of your levels if you haven't had one recently.    9.  Getting adequate sleep is very important for starting your day properly, when we are sleep deprived, our morning appetite is suppressed and without eating an adequate breakfast, we overeat later in the day when we're tired.  Our body heals in our sleep and our mental and immune health depends on this rest.  Aim for 7-8 hours of sleep nightly if possible.  If you sleep is disturbed, perform some introspection on stressors/depression/anxiety/PTSD events or possible sleep disorders and we can trouble shoot solutions/evaulations if issues persist.  " "  Exercise during the day, meditative breathing before bed and after waking and removing the television from the bedroom are easy ways to improve quality of sleep.      10. Relaxation.  Controlled breathing exercises can lower stress levels in the brain.  One technique is 4, 7, 8 breathing:  Place the tip of your tongue behind your front teeth, breath in through your mouth for 4 seconds, Hold it for 7 seconds and breath out slowly, making a leaky tire noise for 8 seconds.  Repeat 4 times.  Ideally do at the start and end of your day or if feeling stressed.  It works and it's why meditation/yoga/martial arts are often very breath based activities.  There are breathing techniques for alertness as well as relaxation out there and they can be quite helpful.      Weight Loss Shopping list:    Having the proper food on hand and ready to go is very important in losing weight.  Everybody has their own preferences/tastes so modify this list as you need but the following are foods that have been found to be helpful in following a calorie restricted diet.   If you are a person that doesn't \"like to eat my vegetables\", I recommend making smoothies and throwing the veggies into the  with some fruit and protein powder.      Fruits:  Generally limit to 2-3 whole fruits a day.  Do not buy Juice.  We depend on the fiber and chewing to slow us down and get phytonutrients/antioxidants available in the skins of fruits for health benefits.  Chewing also signals our stomach to send less hunger signals to our brains:    Apples (1-2 daily), Bananas (no more than one full banana a day), Grapes (2 handfuls a day), Clementines/oranges (one daily), berries (blue/rasp/straw:  2 handfuls a day). Watermelon (cubed for easy access, small bowl).    Vegetables:  Eating raw gets most nutrient and fiber benefits but cooked veggies are fine as well, using frozen for cooking or in smoothies is fine as well.  The more chewing/crunchiness the " "better the hunger control.  No limit to how many a day, but you should at least get 4 handfuls a day or more minimum.  Wash and cut up your vegetables into easy to carry, on the go sizes that are easy to put in plastic bags/pyrex and carry to work/school/etc.  Frozen veggies are just fine as well.     Broccoli, Carrots (no more than 3 a day large carrots or 2 handfuls of baby carrots, as they are very sweet), celery, cucumbers, green peppers, green beans (canned or fresh/frozen), Lettuce(all types), Beets(for roasting, will likely turn urine and stool a bit purple), asparagus.  Go crazy with the veggies, just wash well prior to eating.    Protein:  Women need at least  grams of protein a day and men at least  grams a day, more is fine.  Protein is our \"brain food\" and hunger suppressor and getting enough ensures maintenance of muscle mass during weight loss.  Vegetarians should look to balance their protein intake to get all essential amino acids and discuss with dietician if help is needed (mix of beans/soy/etc).  Protein powders or drinks count and can be a great way to control appetite during the day and easy to grab and go/carry to work/etc.  Premier Protein, BiPro, TarasWhey are all brands with high quality whey protein. For whey sensitive people, rice protein is an option as well.    Canned tuna/sardines or anchovies.  Fresh fish/salmon the day you plan to make it.  Chicken breasts/thighs (skinless while losing weight), filet mignon steak (leaner but ) or marinade sirloin (wipe off before cooking). Eggs cooked in olive oil for breakfast is a good way to get protein and good fat in the a.m. (cook on low heat to prevent transformation of olive oil into less healthy fat).  Greek Yogurt with at least 20 grams of protein per serving and less than 11 grams of carbs/sugars.  String Cheese  Cottage Cheese: 2% or fat free per your preference.          LEAN PROTEIN SOURCES  Getting 20-30 grams of " protein, 3 meals daily, is appropriate for most people, some need more but more than about 40 grams per meal is not useful.  General rule is drinking one ounce of water per gram of protein eaten over the course of the day:  70 grams of protein each day, drink 70 oz of water.  Protein Source Portion Calories Grams of Protein                           Nonfat, plain Greek yogurt    (10 grams sugar or less) 3/4 cup (6 oz)  12-17   Light Yogurt (10 grams sugar or less) 3/4 cup (6 oz)  6-8   Protein Shake 1 shake 110-180 15-30   Skim/1% Milk or lactose-free milk 1 cup ( 8 oz)  8   Plain or light, flavored soymilk 1 cup  7-8   Plain or light, hemp milk 1 cup 110 6   Fat Free or 1% Cottage Cheese 1/2 cup 90 15   Part skim ricotta cheese 1/2 cup 100 14   Part skim or reduced fat cheese slices 1 ounce 65-80 8     Mozzarella String Cheese 1 80 8   Canned tuna, chicken, crab or salmon  (canned in water)  1/2 cup 100 15-20   White fish (broiled, grilled, baked) 3 ounces 100 21   [place]/Tuna (broiled, grilled, baked) 3 ounces 150-180 21   Shrimp, Scallops, Lobster, Crab 3 ounces 100 21   Pork loin, Pork Tenderloin 3 ounces 150 21   Boneless, skinless chicken /turkey breast                          (broiled, grilled, baked) 3 ounces 120 21   [place], [place], [place], and Venison 3 ounces 120 21   Lean cuts of red meat and pork (sirloin,   round, tenderloin, flank, ground 93%-96%) 3 ounces 170 21   Lean or Extra Lean Ground Turkey 1/2 cup 150 20   90-95% Lean [place] Burger 1 joe 140-180 21   Low-fat casserole with lean meat 3/4 cup 200 17   Luncheon Meats                                                        (turkey, lean ham, roast beef, chicken) 3 ounces 100 21   Egg (boiled, poached, scrambled) 1 Egg 60 7   Egg Substitute 1/2 cup 70 10   Nuts (limit to 1 serving per day)  3 Tbsp. 150 7   Nut [place] (peanut, almond)  Limit to 1 serving or less daily 1 Tbsp. 90 4   Soy Burger (varies) 1  15   Melanie,  Black, Husain Beans 1/2 cup 110 7   Refried Beans 1/2 cup 100 7   Kidney and Lima beans 1/2 cup 110 7   Tempeh 3 oz 175 18   Vegan crumbles 1/2 cup 100 14   Tofu 1/2 cup 110 14   Chili (beans and extra lean beef or turkey) 1 cup 200 23   Lentil Stew/Soup 1 cup 150 12   Black Bean Soup 1 cup 175 12         Exercise Guidance    Nearly everything that bothers us gets better when the proper amount of exercise can be done in the proper amounts.  Getting to that level safely and without injury is the key.  When it comes to weight loss, exercise is especially important in maintaining the weight loss.  Unfortunately, one of the harsh realities is that substantial weight loss slows our metabolism, often anywhere from 5-20%.    Our brain always remembers our heaviest weight and we can return to that if we're not mindful and moving regularly.  Our biology doesn't understand the concept of having too much energy, only not having enough.  As such, when we lose weight, it's thought that the brain interprets this as we're ill or in a famine and dials back our metabolism to limit further weight loss.  This is why exercise is so important in keeping the weight off and is the main reason people have some weight regain from their low weight point after weight loss.  We have to make up that 10-20% of calories not being burned.Since we can restrict our intake for only so long, exercise becomes very important in our long term healthy weigh maintenance to balance out the occasional indiscretion with our diet.    Generally, for every 5% body weight reduction in a weight loss season, a person needs to add  kilocalories of exercise in their daily routine to keep that weight off for the long term.  This is why it's vital to be starting your fitness regimen during weight loss season, so that routine is well established as you move into your maintenance period.    Additionally, all sorts of good enzymes and genes turn on with exercise and  our stress, sleep, mood and bodies feel better when we can get to the point of making ourselves a little sweaty and short of breath 35-50 minutes most days of the week. But we have to start with what we can do first and give ourselves permission to work our way up to this goal.    Who isn't ready for exercise? Well, if you get severe dizziness/palpitations, chest pain or short of breath/faint with even minimal activity like walking across a room or you're having to pause while going up a flight of stairs, then getting your heart and/or lungs fully evaluated prior to starting an exercise regimen is recommended. Everyone else can probably start a program, but everyone may start at a different point:  Some can set a 5-10 minute walking goal and others will be able to ride their bike for an hour.      Start with where you're at and look to add 10% more each week until you're at that 150 minutes or more a week (or 75 minutes/week or more of vigorous exercise). Moderate exercise can be estimated as the pace you can carry on a conversation and vigorous is the pace at which you can get 3-5 words out before having to take a breath.  If you're using heart rate monitoring, Moderate is about 60% of your maximum heart rate and vigorous about 75%. (Max heart rate estimated as 220 beats minus your age:  Example: 220-age of 45 =176 Beats per minute (BPM) maximum. 0.6X 176= 105 BPM (moderate), 132 BMP(vigorous)).    If you like to count steps, the 10,000 steps per day does correlate well with weight maintenance but try to make at least 20-25% of those steps at a brisk pace (like you are about to miss your bus).    Finally, if you are pressed for time, it's important to know that some exercise is better than none.  High Intensity Interval training (HIIT) is a good way to get as much out of a short period of working out. If you can't walk, use the stairs, bike or swim; you could use a punching/arm workout regimen for your activity.   "The idea with HIIT is to have a 3-6 minute warm up period of low intensity and the 3-6 \"intervals\" where you push the intensity up and then recover and start the next interval. One study showed that 3 intervals of 20 seconds at \"Maximum Effort\" while either biking on a stationary bike or going up stairs and then having 100 seconds recovery time before the next Maximum Effort was equally as beneficial on cardiovascular fitness development as doing 30 minutes of moderately paced walking 3 days weekly over a 6 week period of time.  So intensity matters. You just need to be able to safely do your desired exercise without injury. There are many great HIIT exercises/routines out there. IF you're not doing much exercise currently, I recommend giving your self 2-3 weeks of moderate exercise, 3 days weekly minimum to get your bones/tendons/muscles used to exercise before going for High Intensity workouts.    If you like to use Apps on the phone, the couch to 5k juan and 7 minute workout apps are nice places to start if you are reasonably healthy.  There are hundreds of other options out there.  Consider viewing tab [email or web address] if gentler exercise/movement is desired. Videos on Alvin Chi and chair yoga for seniors exist and are free. Check them out and let's get that 3-4 days a week routine going.    Let's move!  Mason Mayfield MD.     On-the-Go Breakfast Ideas  As of 2015, the latest research shows what a huge impact eating breakfast has on losing weight and feeling your best. People lose more weight when they make breakfast their biggest meal of the day compared to Dinner, but even if you cannot go to that degree, getting a breakfast that has at least 20 grams of protein and even a moderate amount of fat is ideal for maintaining good energy through the day and limits overeating in the evening hours.  The following are some quick and easy suggestions for at least getting something of substance into your body in the morning.  " Enjoy!    Eating breakfast within 90 minutes of waking up is an important part of taking care of your body.  After sleeping for hours, your body is in need of fuel.  An ideal breakfast is a combination of protein, whole-grain carbohydrates, or fruit.  Here s why:    -Protein digests very slowly in the body, helping you feel more satisfied.  -Whole grains provide dietary fiber, which also digests slowly and helps keep your gut clean.  -Fruit is a great source of vitamins, minerals, and fiber.      Each one of these breakfast combinations has between 200-300 calories and 15-20 grams of protein.  Feel free to mix and match!    Protein: Choose  -1/2 cup low-fat cottage cheese  -2 hard boiled eggs , or one cooked in olive oil (low/slow heat).  -1 low fat string cheese stick  -1 Tablespoon natural peanut butter  -farmaciamarket vegetarian sausage joe (found in freezer section)  -1 slice lowfat cheese  -6 oz 2% or lowfat Greek yogurt, such as Fage or Oikos.    PLUS    Whole Grains:  Choose   -1 whole wheat English muffin  -1 whole wheat blossom, half  -1/2 Fiber One frozen muffin, thawed  -1/2 Fiber One toaster pastry  -1 whole wheat bagel thin  -1/2 cup Kashi cereal  -1 Kashi waffle (or other whole grain high-fiber waffle)    OR    Fruit: Choose  -1/3 cup blueberries  -1/2 banana (or a plantain- similar to a banana, yet smaller)  -1/2 cup cantaloupe cubes  -1 small apple  -1 small orange  -1/2 cup strawberries    *Adapted from Diabetes [employer], Fall 20    Ten Breakfasts Under 250 calories    Ideally, getting between 350-600 calories  (depending on starting height and weight)for breakfast is ideal for avoiding hunger later in the day, adjust/add to the following accordingly:    One- 250 calories, 8.5 g protein  1 slice whole-grain toast   1 Tbsp peanut butter    banana    Two- 250 calories, 8 g protein    cup nonfat/lowfat yogurt  1/3rd cup diced no-sugar peaches  1/3rd cup cereal (like Special K, Cheerios, or bran  flakes)    Three- 250 calories, 25 g protein  1 egg scrambled with 1 oz skim milk    cup shredded cheddar    whole grain English muffin  1 oz [place] day  1 tsp margarine spread    Four- 225 calories, 25 g protein  1/2 cup Kashi Go-Lean cereal    cup skim milk mixed with 1 scoop Bariatric Advantage protein powder    cup no-sugar diced pears    Five- 250 calories, 20 g protein    cup oatmeal prepared with skim milk, 1 scoop protein powder, and sugar-free maple syrup    Six- 200 calories, 5 g protein  1 whole grain waffle, toasted  1 tablespoon creamy peanut or almond butter    Seven-  250 calories, 19 g protein  Breakfast sandwich: 1 slice whole grain toast, cut in half.  Add 1 scrambled egg and one slice cheddar  cheese.    Eight-  250 calories, 15 g protein  2 eggs scrambled with 1/3 cup frozen spinach (heat before adding to eggs) and 2 tablespoons low fat cream cheese.    Nine-  150 calories, 15 g protein  2/3rd cup cottage cheese    cup cantaloupe    Ten- 200 calories, 20 g protein  Fruit smoothie made with 4 oz. nonfat Greek yogurt,   cup berries, 1 scoop protein powder, and 4 oz skim milk.    Ten Lunches Under 250 Calories    Aim for lunch to be around 300-400 calories a day when trying to lose weight and get that protein in!    One- 200 calories, 11 g protein  1/3 cup tuna salad made with light mcconnell on 1 slice whole grain bread  1 small peeled apple    Two- 250 calories, 16 g protein  1/3 cup lowfat cottage cheese    cup cooked green beans    small fruit cocktail (in natural juice)    Three- 200 calories, 11 g protein    grilled cheese sandwich on whole grain bread with lowfat cheese  2/3rd cup of tomato soup    Four- 250 calories, 22 g protein  Deli wrap: 1 oz sliced turkey, 1 oz sliced ham, 1 oz sliced chicken rolled up with 1 slice low-fat cheese  1 small orange    Five- 250 calories, 28 g protein  2/3rd cup chili with 1 oz shredded cheese  4 saltine crackers    Six- 250 calories, 22 g protein  1 cup  fresh spinach with 2 oz chicken, 1/3rd cup mandarin oranges, and 2 tablespoons sliced almonds with 1 tablespoon light vinaigrette dressing    Seven- 200 calories, 11 g protein  1 Tbsp sugar-free preserves and 1 Tbsp peanut butter on 1 slice whole grain toast    cup nonfat/lowfat Greek yogurt    Eight- 250 calories, 18 g protein  1 small soft-shell chicken taco with 1 oz shredded cheese, lettuce, tomato, salsa, and 1 Tbsp light sour cream    cup black beans    Nine- 225 calories, 13 g protein  2 ounces baked chicken  1/4 cup mashed potatoes    cup green beans    Ten- 200 calories, 21 g protein  Deli blossom: 2 oz roast beef or other deli meat with 1 tsp Delbert mayonnaise and sliced tomato, onion, and lettuce  1/3rd cup cottage cheese      Ten Dinners Under 300 calories    If you're eating a large breakfast and medium lunch, keep dinner small.  300-400 calories is ideal for most people depending on their caloric needs.    One- 300 calories, 12 g protein  1-inch thick slice of turkey meatloaf    cup baked butternut squash    Two- 200 calories, 9 g protein  Bread-less BLT: 3 slices turkey day, sliced tomato, wrapped in a large lettuce leaf    cup peeled fruit    Three- 275 calories, 36 g protein  3 oz roasted chicken    cup cooked broccoli    cup shredded cheddar cheese    cup unsweetened applesauce    Four- 200 calories, 25 g protein  3 oz baked tilapia  1/3rd cup cooked carrots    cup yogurt    Five- 250 calories, 20 g protein  Grilled ham  n  Swiss: spread 2 tsp light margarine on 1 slice of whole grain bread.  Cut bread in half, layer 2 oz deli ham with 1 piece of Swiss cheese and grill until cheese is melted.    cup cooked vegetables    Six- 250 calories, 18 g protein  Vegetarian cheeseburger: 1 [place] cheeseburger topped with lettuce, onion, tomato, and ketchup/mustard    cup sweet potato fries    Seven- 250 calories, 18 g protein  Pork pot roast: 2 oz roasted pork loin, 1/3rd cup roasted carrots,   medium potato,  cooked with   cup gravy    Eight- 300 calories, 25 g protein  2 oz meatballs (about 2 small meatballs)    cup spaghetti sauce  1/2 piece toast topped with 1 tsp light margarine and topped with garlic powder, toasted in oven    Nine- 250 calories, 16 g protein  Mexican pizza: one 8  corn tortilla topped with 2 oz chicken,   cup salsa, 2 tablespoons black beans, 2 tablespoons shredded cheese.  Bake until cheese is melted.    Ten- 250 calories, 22 g protein  Shrimp stir-malone: 3 oz cooked shrimp, 1/6th onion,   pepper,   cup chopped carrots sautéed in 1 tablespoon olive oil, topped with 2 tablespoons stir malone sauce and a pinch of sesame seeds    Information about the Weight Loss Medication Phentermine    When combined with a commitment to diet and behavior changes, weight loss medications can be a nice additional tool to maximize your weight loss season.  There are no magic pills and without diet and behavior changes, weight loss will be minimal.  Think of this medication as a tool to make your diet and behavior changes easier and you'll enjoy a higher probability of success.  Remember not to skip meals, but use this medication to tolerate your reduced calories more easily.  If you are very hungry in the evenings, you are likely not eating enough in the first 10 hours of your day and need to focus on getting your protein requirements in at each of your 3 daily meals.      Phentermine is a stimulant medication related to the amphetamine class of medication but with a lower risk of dependence and addiction.  It is used for weight loss by suppressing the appetite region of the brain.  It also may speed up the metabolic rate and give a person more energy.  Like any medication there are potential side effects and the most common are:  Dry mouth occurs in almost everyone (hydrate well), fewer people experience Palpatations, fast heart rate, elevation of blood pressure, restlessness, insomnia, dizziness, change in mood, tremor,  "headache, changes in bowel movements,itchiness, changes in sex drive.    Some people can develop serious side effects which include:  Heart strain (\"ischemia\").  Tachycardia (fast heart rate or irregular heart rate).  Hypertension  Pulmonary Hypertension  Psychosis  Dependency and abuse has occurred in some.  If you've been on high dose (37.5mg) for long periods, phentermine should be tapered down over a few weeks before abruptly stopping as seizures have been reported rarely.    We do not recommend taking it in combination with the following medications due to potential drug interactions which can increase the risk of side effects and/or potential for seizures:    Absolutely contraindicated are:  Amphetamines or other stimulants like ADHD medication: (dextroamphetamine, amphetamine, diethylpropion, isocarboxazid, methamphetamine, lisdexamfetamine, benzphetamine,dexmethylphenidate, methylphenidate, selegiline patch, sibutramine, tranylcypromine.    Avoid use with:   Dopamine, dobutamine, ephedra, ephedrine, epinephrine, isoproterenol, linezolid, norepinephrine, phenylephrine injection, venlafaxine (Effexor).    Monitor or modify dose with:  Acebutolol, atenolol, betaxolol, bisoprolol, carvedilol, droxidopa, esmolol, labetalol, magnesium citrate, metoprolol, nadolol, nebivolol, penbutolol, pindolol, propranolol, sotalol, timolol.    Caution with: armodafinil,betaxolol eye drops, brexpiprazole, bupropion, busulfan, caffeine, carteolol drops, enzalutaminde, ginseng, green tea, guarana, levobunolol drops, lindane cream, modafinil, afrin nasal spray (oxymetazoline), pamabrom, phenylephrine oral and nasal spray, pseudoephedrine (sudafed), rasgiline, sleegiline, bowel prep, tiagabine, timolol drops,  TRAMADOL due to increased risk of seizures.    The current cheapest place to fill your prescription is at [facility], [facility] or [facility] and is around $30 for 90 tablets.  Occasionally, [facility] and [facility] have price matched, so " call around and get the best price for you.  Other pharmacies may charge closer to$70- $100 for the same prescription. You don't have to be a member to use the pharmacy at [facility] currently.  An alternative some patients have tried is using a voucher system through [employer].  $25 paid on their website gets you a  voucher that can allows you to pick your meds up at [facility] without paying anything more.    Dosing:  We start with half a tablet for the first 2 weeks and if tolerating it without problems, you can take up to one full tablet daily in the morning after breakfast.  For those with evening hunger problems, sometimes half a tablet in the morning and half a tablet around 1-2 pm can be effective, however, risks of nighttime insomnia/restless increase with afternoon dosing so call me at the clinic if considering this regimen or having any issues.  You only have to use the amount effective for you, not to exceed one full tablet.  It can also be used situationaly and does not have to be taken every day. As always, if any questions give us a call at the [facility] telephone:  [phone].      MEDICATIONS FOR WEIGHT LOSS  There are several medications available to assist us in weight loss.  By themselves, without compliance to a change in diet and increase in movement/activity these medications are disappointing in their results. However, combined with a closely monitored program of diet change and exercise they can be very effective in controlling appetite and boosting initial weight loss.  All weight loss medications need continual re-evaluation for efficacy as their side effects and health benefits fail to be worthwhile if a person is not continuing to lose weight or in maintaining their healthy weight.  Some weight loss medications are scheduled drugs, meaning there is at least a theoretical possibility for developing addiction to them but in practice this is rare.  We do anticipate  coming off meds in the future after stabilization of weight loss is assurred.  Finally, a tolerance can develop and people s perceived efficacy of medication can diminish.  In communication with your physician, it may be appropriate to intermittently take a break from these medications and then restart again (few weeks off then restart again) if a plateau is reached that cannot be broken through.  Each person can respond to a medication differently and to be a good option for you, it will need to be affordable, effective and well tolerated with minimal side effects.    In most cases, weight loss progress after one month and three months will be obtained and if a patient is not reaching the satisfactory progress towards weight loss, the medications may be discontinued.  The thought is that if a person is taking a weight loss medication and not receiving the potential health benefit of that drug, the side effects are not worthwhile and use should be discontinued.  On the flip side, there are many people on some weight loss medications for years because it continues to be an effective tool in their weight management and they are tolerating the medication without any long-term side effects.  Each person's response and purpose will be evaluated.      PHENTERMINE (Adipex): approved in 1959 for appetite suppression.  It has stimulant effects and cannot be used with Ritalin, Concerta, or other stimulants.  Although it is not highly addictive, it's chemically related to amphetamines which are addictive.  Occasional dependence can develop, but rarely. The most common side effects are dry mouth, increased energy and concentration, increased pulse, and constipation.  You should not take phentermine if you have glaucoma, hyperthyroidism, or uncontrolled/untreated hypertension or overly anxious. You should stop if dramatic mood swings, severe insomnia, palpations, chest pains, visual changes or if your Blood Pressure is  "consistently elevated or any time it's over 160/90.   It's ok to go off the med for a few weeks and restart if efficacy is wearing off.  $24-$30 for 90 tablets at [facility]. Females are required to have reliable birth control to reduce the risk birth defects/miscarriage.        Liraglutide (Victoza/Saxenda):   Part of the family of Glucagon Like Peptide Agonists, liraglutide directly suppresses appetite and is often used by diabetic patients due to decrease liver production of glucose, thereby improving glucose levels.  It also slows how quickly the stomach empties. May be hard to get covered for non diabetics and is an injectable medication delivered via a injector pen. It can be very costly without insurance coverage (over $500/month).  Small risk for pancreatitis and dose should be held if increased mid abdominal pain/burning. It is not to be used if previous Multiple Endocrine Neoplasia. In rodents, may increase risk of thyroid tumors and not indicated for anyone with hx of medullary thyroid cancer as a result.  If changes in voice/swallowing should be discontinued. Reliable birth control required in women.        Plenity:  not yet available.  2-3 capsules taken 20 minutes before 2 meals daily provides crystals that expand into a gel that provides a mechanical fullness. Gel mixes with your next meal and increases satisfaction by bulking the meal up to feel bigger than it truly is. Plenity is not absorbed and gets passed through the digestive tract and excreted in stools. May cause some bloating/gas/full feeling as it behaves like a fiber in many ways. Cost not available yet. FDA cleared in March of 2019 but not available in stores as of March of 2020. Clearance safety and efficacy data done under \"Gelesis\" name. Not appropriate for people with stomach or bowel motility issues as requires you to pass it through digestive tract.       50 Things to do Instead of Snacking  We'll all have urges to snack " sometimes. Hunger, mood, stress, boredom and distraction are common triggers so being mindful and thinking about what is driving a particular urge to snack at a particular time can be helpful for reducing the urge in the future.  Remember, the urge to snack doesn't have to be obeyed. The urge exists, but you can watch it pass. Over time, you will get good at the exercise of experiencing an urge, acknowledging it, but letting it pass you by and float away.  Until you get there, here are some activities to pass the 3-5 minutes that most urges last. Good hydration is always helpful.  If you do struggle with impulse/urge control, consider some therapy based on cognitive behavioral therapy or ACT (Acceptance and Commitment Therapy).  Apps/subscriptions like [employer] emphasize some of these tools and can be of help for those able to afford the juan/subscription (about $25/month).  1. Imagine the new healthier you   2. Walk around the block   3. Call a friend   4. Make a list of your Top Ten Reasons to Lose Weight   5. Make a To Do list   6. Turn on music and dance   7. Jot a thank you note to someone   8. Go to bed early or take a nap  9. Read a book   10. Blog or journal  11. Give yourself a manicure or pedicure   12. Plan a healthy meal for your family   13. Surf the Internet   14. Finish an unfinished project   15. Walk your dog, pet your cat, feed your fish  16. Brush your teeth   17. Balance your checkbook   18. Say a prayer   19. Chop veggies for later use.  20. Give a massage   21. Clean out a junk drawer   22. Play a game with your kids   23. Try a new route on your walk     24. Drink a glass of water   25. Kiss someone   26. Try on some of your clothes   27. Look at old pictures   28. Rent a video   29. Wash your car   30. Take a hot, soothing bath   31. Update your calendar   32. Work in your yard   33. Start your holiday shopping list   34. Count your blessings   35. Write a letter   36. Fold some laundry   37.  Check your e-mail   38. Give your dog a bath   39. Send a birthday card   40. Meditate   41. Hug someone   42. Rearrange some furniture   43. Light a fire or some candles   44. Put your pictures in an album   45. Plan a trip (real or imaginary)  46. Straighten a closet   47. Clean out a files   48. Visit a friend  49. Clean out your trunk  50. Do something nice for someone

## 2021-06-09 NOTE — TELEPHONE ENCOUNTER
"Patient calls today with assistance of . She asks to be tested for COVID. She has had headache, sore throat, and coughing x2 days. Denies fevers. Denies shortness of breath or chest pain.    I told patient that we are offering tests to symptomatic patients who meet criteria. The test must be ordered by a provider. It is likely that a provider would approve her for a test at this time based on her symptoms. Patient states she is able to use the online [employer] option for this. I asked patient if she felt comfortable using the website and that the visit would be in English. Patient states that she is able to use [employer] and can do the online visit in English. Directions provided on how to use [facility]. She will complete that visit today. Advised to call back with additional questions or worsening symptoms. Patient verbalizes understanding of nurse advice.    Discharge Instructions for COVID-19 Patients  You have--or may have--COVID-19. Please follow the instructions listed below.   If you have a weakened immune system, discuss with your doctor any other actions you need to take.  How can I protect others?  If you have symptoms (fever, cough, body aches or trouble breathing):    Stay home and away from others (self-isolate) until:  ? At least 10 days have passed since your symptoms started. And   ? You've had no fever--and no medicine that reduces fever--for 3 full days (72 hours). And   ? Your other symptoms have resolved (gotten better).  If you don't show symptoms, but testing showed that you have COVID-19:    Stay home and away from others (self-isolate) until at least 10 days have passed since the date of your first positive COVID-19 test.  During this time    Stay in your own room, even for meals. Use your own bathroom if you can.    Stay away from others in your home. No hugging, kissing or shaking hands. No visitors.    Don't go to work, school or anywhere else.    Clean \"high touch\" surfaces " often (doorknobs, counters, handles). Use household cleaning spray or wipes. You'll find a full list of  on the EPA website: www.epa.gov/pesticide-registration/list-n-disinfectants-use-against-sars-cov-2.    Cover your mouth and nose with a mask, tissue or wash cloth to avoid spreading germs.    Wash your hands and face often. Use soap and water.    Caregivers in these groups are at risk for severe illness due to COVID-19:  ? People 65 years and older  ? People who live in a nursing home or long-term care facility  ? People with chronic disease (lung, heart, cancer, diabetes, kidney, liver, immunologic)  ? People who have a weakened immune system, including those who:    Are in cancer treatment    Take medicine that weakens the immune system, such as corticosteroids    Had a bone marrow or organ transplant    Have an immune deficiency    Have poorly controlled HIV or AIDS    Are obese (body mass index of 40 or higher)    Smoke regularly    Caregivers should wear gloves while washing dishes, handling laundry and cleaning bedrooms and bathrooms.    Use caution when washing and drying laundry: Don't shake dirty laundry and use the warmest water setting that you can.    For more tips on managing your health at home, go to www.cdc.gov/coronavirus/2019-ncov/downloads/10Things.pdf.  How can I take care of myself at home?  1. Get lots of rest. Drink extra fluids (unless a doctor has told you not to).  2. Take Tylenol (acetaminophen) for fever or pain. If you have liver or kidney problems, ask your family doctor if it's okay to take Tylenol.     Adults can take either:  ? 650 mg (two 325 mg pills) every 4 to 6 hours, or   ? 1,000 mg (two 500 mg pills) every 8 hours as needed.  ? Note: Don't take more than 3,000 mg in one day. Acetaminophen is found in many medicines (both prescribed and over-the-counter medicines). Read all labels to be sure you don't take too much.  For children, check the Tylenol bottle for the  right dose. The dose is based on the child's age or weight.  3. If you have other health problems (like cancer, heart failure, an organ transplant or severe kidney disease): Call your specialty clinic if you don't feel better in the next 2 days.  4. Know when to call 911. Emergency warning signs include:  ? Trouble breathing or shortness of breath  ? Pain or pressure in the chest that doesn't go away  ? Feeling confused like you haven't felt before, or not being able to wake up  ? Bluish-colored lips or face  5. Your doctor may have prescribed a blood thinner medicine. Follow their instructions.  Where can I get more information?    [facility] - About COVID-19:   [email or web address]    CDC - What to Do If You're Sick: www.cdc.gov/coronavirus/2019-ncov/about/steps-when-sick.html    [facility] - Ending Home Isolation: www.cdc.gov/coronavirus/2019-ncov/hcp/disposition-in-home-patients.html    [facility] - Caring for Someone: www.cdc.gov/coronavirus/2019-ncov/if-you-are-sick/care-for-someone.html    [facility] - Interim Guidance for Hospital Discharge to Home: www.health.[facility].[place]/diseases/coronavirus/hcp/hospdischarge.pdf    [facility] clinical trials (COVID-19 research studies): [email or web address]-[email or web address]    Below are the COVID-19 hotlines at the [facility] ([facility]). Interpreters are available.  ? For health questions: Call 131-939-9601 or 1-646.363.7365 (7 a.m. to 7 p.m.)  ? For questions about schools and childcare: Call 029-495-8859 or 1-431.174.3363 (7 a.m. to 7 p.m.)    For informational purposes only. Not to replace the advice of your health care provider. Clinically reviewed by the Infection Prevention Team.Copyright   2020 [employer]. All rights reserved. W4 [id number] - 06/20.    Saadia Dunbar RN

## 2021-06-09 NOTE — TELEPHONE ENCOUNTER
----- Message from Aleah Lucero MD sent at 6/17/2020  2:22 PM CDT -----  Will need  to help her fill out questionairre can she find someone to help her translate the questionairre?  schedule for full weight loss intake when lab results and questionairre are available.

## 2021-06-10 NOTE — TELEPHONE ENCOUNTER
Central PA team  [phone]  Pool: HE PA MED ([id number])          PA has been initiated.       PA form completed and faxed insurance via Cover My Meds     Key:  [id number] - Rx #: [id number]     Medication:  Phentermine HCl 37.5MG tablets    Insurance:  [employer]        Response will be received via fax and may take up to 5-10 business days depending on plan

## 2021-06-10 NOTE — TELEPHONE ENCOUNTER
Prior Authorization Request  Who s requesting:  Pharmacy  Pharmacy Name and Location: [facility]  Medication Name: Phentermine  Insurance Plan: [employer]  Insurance Member ID Number:  [id number]  CoverMyMeds Key: [id number]  Informed patient that prior authorizations can take up to 10 business days for response:   Yes  Okay to leave a detailed message: Yes

## 2021-06-10 NOTE — PROGRESS NOTES
Pt's insurance does not cover phentermine and PA was denied. Pt would like it sent to a different pharmacy with a lower hearn ramirez.     Jayleen De La Rosa RN, CBN  [facility]  P [phone]  F [phone]

## 2021-06-10 NOTE — PROGRESS NOTES
"Vernell Christensen is a 25 y.o. female who is being evaluated via a billable telephone visit.      The patient has been notified of following:     \"This telephone visit will be conducted via a call between you and your physician/provider. We have found that certain health care needs can be provided without the need for a physical exam.  This service lets us provide the care you need with a short phone conversation.  If a prescription is necessary we can send it directly to your pharmacy.  If lab work is needed we can place an order for that and you can then stop by our lab to have the test done at a later time.    Telephone visits are billed at different rates depending on your insurance coverage. During this emergency period, for some insurers they may be billed the same as an in-person visit.  Please reach out to your insurance provider with any questions.    If during the course of the call the physician/provider feels a telephone visit is not appropriate, you will not be charged for this service.\"    Patient has given verbal consent to a Telephone visit? Yes    What phone number would you like to be contacted at? [phone] (M)    Patient would like to receive their AVS by AVS Preference: Natan.  Inteview done w/ .  Additional provider notes: follow up on nonsurgical weight loss: 7/22/20  lbs, BMI 35. 4. Using phentermine and followed up with dietician since our intake visit. Has a RMR of 1628kcal/day and has been using metformin for prediabetes (A1c of 6% has come down to 5.8%). Had very low vitamin D levels and reports regular use now. Tolerating medication well. Reports her weight is around 190 lbs.  Plan:  1. Continue great work on weight loss of about 5% of your body weight. Continue mindful eating getting your protein in at 3 meals daily.  2. Continue phentermine if tolerated   3. Continue to work on getting 1100-1300kcal per day if exercising at least 3 days weekly.    Phone call " duration: 16 minutes    Barry Godwin LPN

## 2021-06-10 NOTE — PROGRESS NOTES
"Vernell Christensen is a 25 y.o. female who is being evaluated via a billable telephone visit.      The patient has been notified of following:     \"This telephone visit will be conducted via a call between you and your physician/provider. We have found that certain health care needs can be provided without the need for a physical exam.  This service lets us provide the care you need with a short phone conversation.  If a prescription is necessary we can send it directly to your pharmacy.  If lab work is needed we can place an order for that and you can then stop by our lab to have the test done at a later time.    If during the course of the call the physician/provider feels a telephone visit is not appropriate, you will not be charged for this service.\"     Vernell Christensen complains of    Chief Complaint   Patient presents with     Nutrition Counseling       I have reviewed and updated the patient's Past Medical History, Social History, Family History and Medication List.    ALLERGIES  Patient has no known allergies.    Additional provider notes:     Medical  Weight Loss Initial Diet Evaluation  Vernell is presenting today for a new weight management nutrition consultation. Pt has had an initial appointment with Dr. Mayfield.  Weight loss medication: Phentermine.   Nutrition Assessment:   Anthropometrics:  Pt's Initial Weight: 200 lbs  Weight: 200 lb (90.7 kg)  Weight loss from initial: 0  % Weight loss: 0 %    BMI: There were no vitals filed for this visit.   IBW: 115-125 lb   Estimated RMR ([place]-St Jeor equation): 1628 kcals x  1.3 (lightly active) = 2117 kcals (for weight maintenance)  Recommended Protein Intake: 85-95 grams of protein/day  Medical History:  Patient Active Problem List   Diagnosis     Cholestasis during pregnancy     IUD (intrauterine device) in place     Class 2 obesity     Drug-induced weight gain     Insulin resistance      Nutrition History:   Food allergies/intolerances/cultural or religous food " customs: No  Weight loss history: In the past her Nexplanon contributed to 15-20kg weight gain both before 2nd and 3rd pregnancies and since her youngest was born  Biggest weight loss struggle per pt: high carbohydrate meals  Vitamins/Mineral Supplementation: Vitamin D3  Dietary Recall:  Works at high volume [employer] from 6 am to 2:30 pm - brings her own lunch and avoids the donuts/baked goods  Wake up time: 5:30-6 am  Breakfast: 8 am - smoothie, almond milk, oatmeal, and banana  Snack: 2 tangerine  Lunch:nuts   Snack:   Dinner: 7 pm rice with chicken   Hydration (type/oz. per day):  Water: 1.5 liter per day  Exercise:  Routine exercise established: Yes  Walking, 45 minutes; 4-5 days  Nutrition Diagnosis (PES statement):   Overweight/Obesity (NC 3.3) related to overeating and poor lifestyle habits as evidenced by BMI 35  Nutrition Intervention:  1. Food and/or Nutrient Delivery   a. Placed emphasis on importance of developing a healthy meal routine, aiming for 3 meals a day and no snacks.  2. Nutrition Education   a. Discussed with patient how to build a meal: the importance of including a lean/low fat protein at each meal, include a source of vegetables at a minimum of lunch and dinner and limiting carbohydrate intake to <25% per meal.  b. Educated on sources of lean protein, portion sizes, the amount of grams found in each source.  c. Discussed the importance of adequate hydration, with emphasis on drinking 64oz of water or zero calorie beverages per day.  3. Nutrition Counseling   a. Encouraged importance of developing routine exercise for health benefits and weight loss.  Goals established by patient:   1. Continue eating 3x per days including a protein source at each meal; keep carbohydrate to less than 25% of meal  2. Continue drinking lot of water daily, at least 1.5 liters  3. Continue exercise routine of 4-5 days per week  Assessment/Plan:  1. Obesity (BMI 30-39.9)    Pt will follow up in 1 month(s) with  bariatrician and 2 month(s) with dietitian.     At follow up visit:  Talk more about protein needs    Phone call duration: 30 minutes    Claribel Escamilla RD

## 2021-06-11 ENCOUNTER — COMMUNICATION - HEALTHEAST (OUTPATIENT)
Dept: SCHEDULING | Facility: CLINIC | Age: 26
End: 2021-06-11

## 2021-06-11 NOTE — ANESTHESIA PREPROCEDURE EVALUATION
Anesthesia Evaluation      Patient summary reviewed   No history of anesthetic complications     Airway   Mallampati: II  Neck ROM: full   Pulmonary - negative ROS    breath sounds clear to auscultation                         Cardiovascular - negative ROS and normal exam  Rhythm: regular  Rate: normal,         Neuro/Psych - negative ROS     Endo/Other    (+) diabetes mellitus type 2, obesity,      GI/Hepatic/Renal    (-) GERD          Dental - normal exam                        Anesthesia Plan  Planned anesthetic: general endotracheal    ASA 2   Induction: intravenous   Anesthetic plan and risks discussed with: patient  Anesthesia plan special considerations: antiemetics,   Post-op plan: routine recovery

## 2021-06-11 NOTE — PROGRESS NOTES
"Hospital Follow-up Visit:    Assessment/Plan:     1. Hospital discharge follow-up  - acetaminophen (TYLENOL) 325 MG tablet; Take 2 tablets (650 mg total) by mouth every 4 (four) hours as needed for pain.  Dispense: 100 tablet; Refill: 2  Note provided for work.  She will call back to work on 9/20/2020.  No lifting more than 20 pounds for 2 weeks.    2. Calculus of gallbladder without cholecystitis without obstruction  Cholecystectomy on 9/4/2020.  Doing well.    3. Immunization due  - Influenza, Seasonal Quad, PF =/> 6months       Subjective:     Vernell Christensen is a 25 y.o. female who presents for a hospital discharge follow up.  She had cholecystectomy on 9/4/2020.  Doing well after hospital discharge.  She is not taking narcotic pain medication but wants Tylenol to have it as needed.  No fever since hospital discharge.  No cough, sore throat, chest pain or shortness of breath.    Hospital/Nursing Home/IP Rehab Facility: [facility]  Date of Admission: 9/4/2020.  Date of Discharge: 9/5/2020  Reason(s) for Admission: Cholelithiasis            Do you have any problems taking your medication regularly?  None       Have you had any changes in your medication since discharge? None       Have you had any difficulty following your discharge or treatment plan?  No    Summary of hospitalization:  Hospital discharge summary reviewed  Diagnostic Tests/Treatments reviewed.  Follow up needed: None  Update since discharge: {improved   Information from family, SNF, care coordination: N/A     Post Discharge Medication Reconciliation: discharge medications reconciled, continue medications without change  Plan of care communicated with: patient    Objective:     Vitals:    09/17/20 1215   BP: 108/64   Pulse: 82   Resp: 20   Temp: 98.1  F (36.7  C)   TempSrc: Oral   Weight: 187 lb 5 oz (85 kg)   Height: 5' 3\" (1.6 m)   LMP: 09/12/2020         Physical Exam:  Gen - alert, orientated, NAD  ENT - oropharynx clear  Neck - supple, no " palpable mass or lymphadenopathy  CV - RRR, no murmur  Resp - lungs CTA  Ab - soft, nontender, no palpable mass or organomegaly.  Incision sites dry and clean.  No signs of infection.  Skin - no rash.  No atypical appearing lesions seen.         Coding guidelines for this visit:  Type of Medical   Decision Making Face-to-Face Visit       within 7 Days of discharge Face-to-Face Visit        within 14 days of discharge   Moderate Complexity [id number]   High Complexity [id number]       Electronically signed by Jerry Copeland MD 09/17/20 1:23 PM  disch

## 2021-06-11 NOTE — ANESTHESIA CARE TRANSFER NOTE
Last vitals:   Vitals:    09/04/20 1432   BP: 116/58   Pulse: 72   Resp: 20   Temp: 36.1  C (96.9  F)   SpO2: 100%     Patient's level of consciousness is drowsy  Spontaneous respirations: yes  Maintains airway independently: yes  Dentition unchanged: yes  Oropharynx: oropharynx clear of all foreign objects    QCDR Measures:  ASA# 20 - Surgical Safety Checklist: WHO surgical safety checklist completed prior to induction    PQRS# 430 - Adult PONV Prevention: 4558F - Pt received => 2 anti-emetic agents (different classes) preop & intraop  ASA# 8 - Peds PONV Prevention: NA - Not pediatric patient, not GA or 2 or more risk factors NOT present  PQRS# 424 - Alyce-op Temp Management: 4559F - At least one body temp DOCUMENTED => 35.5C or 95.9F within required timeframe  PQRS# 426 - PACU Transfer Protocol: - Transfer of care checklist used  ASA# 14 - Acute Post-op Pain: ASA14B - Patient did NOT experience pain >= 7 out of 10

## 2021-06-11 NOTE — PATIENT INSTRUCTIONS - HE
1. Continue great work on weight loss of about 5% of your body weight. Continue mindful eating getting your protein in at 3 meals daily.  2. Continue phentermine if tolerated   3. Continue to work on getting 1100-1300kcal per day if exercising at least 3 days weekly.      To help lose weight in a safe way and sustainable way, I'd like to start you on a 1300 calorie diet each day.  Understanding that every 3500 calorie deficit adds up to a pound of weight reduction, with the combination of light aerobic exercise, some modest weight training and diet, we should be able to lose around 1 to 2.5lbs weekly depending on your starting height and weight and exercise routine with this goal intake.    Hunger and fatigue are the enemies of weight loss and behavior change in general.  We become much more reactive in our eating when we're hungry and tired and decrease our levels of self control.  It's not a personal failing, it's just body chemistry.   We can combat this by trying to avoid being tired and hungry at the same time.  To help this,  try to front load your calories in the first 10 hours of you day so you get into the fatigued evening hours reasonably full and you can control impulses/mindless eating a lot better and avoid those bedtime snacks/evening treats that the tired brain craves.  If you can getting your exercise in the beginning of your day has also been shown to have superior results (but anytime is better than none).    Weight loss goes through ups and downs and plateaus but if you stay on the program, you will enjoy success.   Commit to the process, try to be good at least 19 days out of 20 and continue to think about why you're doing this and what you're working towards. If you haven't thought of your reward for hitting your weight loss goals, think about it now. Using these little victory bribes along the way helps a lot.    Finding a diet that is satisfying and repeatable-- day in and day out, improves  success.  An outline of how to break up the day's food is given below.  It is a starting point and example of what a 1300 calorie diet looks like.  You can modify the listed foods to suite your particular tastes, but pay attention to portions and protein content.   Do not skip breakfast on this plan as it will leave you hungry and lead to overeating at some point during the rest of the day.  If you can make supper the last food for the day more days of the week then not, it will help a lot.  That means that the intake during those first 10-12 hours of the day and hitting your protein/intake targets for all three meals is vital to your success and evening hunger.    Start reading labels so you know that you're getting what you think you are and start measuring foods so you can eventually look at a portion and understand how it will provide the fuel you want.    Prepping raw veggies after you buy them (washing and putting into bags/tupperware for easy access), cooking several chicken breasts/proteins for the next 2-3 lunches and generally being able to grab and go what will keep you on target when time is short will greatly aid your success.  Prepare and plan and success will follow.    Read labels:  for protein portions/yogurt, protein bars etc looking for items with more than 10 grams of protein and less than 10 grams of sugar is very helpful.  Frozen meals should have at least 18 grams of protein, under 10 grams of sugar as well (typically around 300-380 calories).    Please note: if you've had previous bariatric surgery: wait 20-30 minutes before/after eating to drink your beverages to avoid early fullness/dumping syndrome/worsening malabsorption, early loss of fullness and hunger.  Start with eating your protein first, slow down your meals and chew thoroughly.          1300 calorie diet:  Think Big Breakfast, Medium Lunch, smaller dinner.  Ms. Christensen, for you, decrease the portions about 10% to hit your sweet spot  for weight loss. Make sure you're getting 20-25g of protein at each of your 3 meals daily. Hydrate w/ 64-80 oz of water daily (2Liters-2.5liters daily).    Breakfast goal of 300 calories-350 calories (egg, 1/3 to 1/2 cup cooked old fashioned oatmeal or steel cut oats and berries,3-4oz greek yogurt.)Glass of water and if you like coffee (black) or tea.        Mid morning Snack or part of lunch, about 2-2.5 hrs later: 100 calories (cottage cheese/string cheese/ fruit or banana) and a handful of cruchy/green veggies (cucumber/celery/green peppers/broccoli).  Small glass of water    Lunch:  300 calories (3.5-4 oz tuna with little mcconnell (no bread), apple, salad with drizzle of olive oil/balsamic vinegar for example)  Water    Snack:  100 calories.  4-5 oz Greek Yogurt with at least 17 grams of protein per serving.    Or Cottage cheese (lower sodium version preferable). Get this in about 2 hrs before you plan to have dinner. Protein drinks with at least 15-20 grams of protein and less than 6 grams of sugar could be used here to hit protein goals and decrease afternoon/evening hunger.  Glass of water    Dinner:  350 calories (4 oz meat or fish with cooked veggies or salad with minimal dressing, one piece of bread).  Glass of water or unsweetened tea with lemon  Dessert: 100 calories Medium Apple or Small handful of nuts, about 2/3 of an ounce (almonds/walnuts/cashews or pistachios are ideal).   Glass of water.    Try to avoid all soda and juice (low sodium V8 ok once a day).   You can do it!    Choose an activity that is fun/interesting and available to you such as  Going for a swim for 15 minutes, walking 40 minutes, elliptical 20 minutes or cycling 20 minutes as many days a week as you can.  If those times are too long for your fitness level, start at 10 minutes of movement and each week try to increase by 2 minutes each week.  The first 70 minutes a week (10 minutes a day only) of exercise drops the mortality rate of a  sedentary person 30%!!!!  Our goal is to work up to 150 minutes or more per week of moderate to vigorous exercise  to optimize metabolism and prevent weight regain during maintenance. If time is short and your fitness allows it, high intensity interval training can be a nice way to cut the workout time in half:  warm up 2-4 minutes then 3-6 intervals of increased intensity effort (70% of max heart rate) followed by an equal amount or more of recovery before repeating, then 1-3 minutes of cooldown.     10 minutes of weight lifting can be helpful as well or using some body weight exercises like wall squats, pushups (with assistance as needed or standing/wall pushups), seat presses, yoga moves. At least twice weekly helps maintain a good strength to weight ratio, more days is better.  [employer] is a great resource for free video demonstrations.    If you are into strenuous weight lifting or prolonged exercise, use an online calculator for how many calories you've burned and if your exercise is lasting over 60 minutes, replace 20-30% of those calories burned immediately after exercise .  For the more limited exercise (less than 500 calories burned), there is no need to add extra food unless you notice a lot of hunger on your food journal.  Usually  Even a  calorie load after longer workouts is more than adequate.  For longer efforts, hunger will increase if you don't refuel afterwards and can get meal plan off track due to hunger, so replenish immediately after the workout to keep on the diet plan and feeling good.    Exercise example:  If you burned 1000 calories during the exercise, immediately (within 30 minutes) have a snack/replacement beverage totaling 200-300 calories and ideally have a 3:1 ratio of carbohydrate grams to protein grams to keep muscles ready for exercise the next day.  Have your next, full meal within 2-3 hours of exercise.    Tips for success:  KEEP A FOOD JOURNAL and a log of daily weights.   "Pencil and paper works fine for most. Otherwise, Myfitnesspal, fitbit, [employer], Loseit, [employer] are all good tracker apps/programs or websites for food/fitness.  Remembering to ask yourself, \"How did my nourishment affect me today\" and comparing \"good days\" to \"hungry days\" to solidify what helps your body, in your life, feel it's best while losing weight.    1.  Prepare proteins ahead of time (broil chicken breasts in bulk so you can grab and go), steel cut oats can be stored in casserole dish/bowl in the fridge for quick scoop in the morning and rewarm in microwave, make use of crock pot recipes (watch salt content).    2.  Drink a 8-12 oz glass of water every 2-3 hours when awake.  We often mistake hunger for thirst, especially when losing weight.    3. Remember your Reward and Motivation when things get hard.    4.  Weigh yourself every morning and record, you'll stay on track better and learn how your body loses weight. Don't worry about 1 or 2 day patterns, but when on track you'll notice good trend downward of weight over 3-4 day segments.    5. Call or use [employer] messaging if problems/concerns .    6.  Find a handful of meals/foods that keep you on track and get into a boring routine that is sustainable for you.    7.  Take a complete multivitamin just to make sure all micronutrients are adequate during weight loss.    8. If losing hair/brittle nails it often means you are not taking enough protein.  Minimum goal is 60 grams daily of protein, most people with normal kidneys do well with upwards of 100-120 grams/day of protein. Consider taking Biotin as supplement or a \"Hair and Nail\" multivitamin.  If you are hypothyroid and losing hair, see you doctor for a check up of your levels if you haven't had one recently.    9.  Getting adequate sleep is very important for starting your day properly, when we are sleep deprived, our morning appetite is suppressed and without eating an adequate breakfast, we overeat " later in the day when we're tired.  Our body heals in our sleep and our mental and immune health depends on this rest.  Aim for 7-8 hours of sleep nightly if possible.  If you sleep is disturbed, perform some introspection on stressors/depression/anxiety/PTSD events or possible sleep disorders and we can trouble shoot solutions/evaulations if issues persist.    Exercise during the day, meditative breathing before bed and after waking and removing the television from the bedroom are easy ways to improve quality of sleep.      10. Relaxation.  Controlled breathing exercises can lower stress levels in the brain.  One technique is 4, 7, 8 breathing:  Place the tip of your tongue behind your front teeth, breath in through your mouth for 4 seconds, Hold it for 7 seconds and breath out slowly, making a leaky tire noise for 8 seconds.  Repeat 4 times.  Ideally do at the start and end of your day or if feeling stressed.  It works and it's why meditation/yoga/martial arts are often very breath based activities.  There are breathing techniques for alertness as well as relaxation out there and they can be quite helpful.      Information about the Weight Loss Medication Phentermine    When combined with a commitment to diet and behavior changes, weight loss medications can be a nice additional tool to maximize your weight loss season.  There are no magic pills and without diet and behavior changes, weight loss will be minimal.  Think of this medication as a tool to make your diet and behavior changes easier and you'll enjoy a higher probability of success.  Remember not to skip meals, but use this medication to tolerate your reduced calories more easily.  If you are very hungry in the evenings, you are likely not eating enough in the first 10 hours of your day and need to focus on getting your protein requirements in at each of your 3 daily meals.      Phentermine is a stimulant medication related to the amphetamine class of  "medication but with a lower risk of dependence and addiction.  It is used for weight loss by suppressing the appetite region of the brain.  It also may speed up the metabolic rate and give a person more energy.  Like any medication there are potential side effects and the most common are:  Dry mouth occurs in almost everyone (hydrate well), fewer people experience Palpatations, fast heart rate, elevation of blood pressure, restlessness, insomnia, dizziness, change in mood, tremor, headache, changes in bowel movements,itchiness, changes in sex drive.    Some people can develop serious side effects which include:  Heart strain (\"ischemia\").  Tachycardia (fast heart rate or irregular heart rate).  Hypertension  Pulmonary Hypertension  Psychosis  Dependency and abuse has occurred in some.  If you've been on high dose (37.5mg) for long periods, phentermine should be tapered down over a few weeks before abruptly stopping as seizures have been reported rarely.    We do not recommend taking it in combination with the following medications due to potential drug interactions which can increase the risk of side effects and/or potential for seizures:    Absolutely contraindicated are:  Amphetamines or other stimulants like ADHD medication: (dextroamphetamine, amphetamine, diethylpropion, isocarboxazid, methamphetamine, lisdexamfetamine, benzphetamine,dexmethylphenidate, methylphenidate, selegiline patch, sibutramine, tranylcypromine.    Avoid use with:   Dopamine, dobutamine, ephedra, ephedrine, epinephrine, isoproterenol, linezolid, norepinephrine, phenylephrine injection, venlafaxine (Effexor).    Monitor or modify dose with:  Acebutolol, atenolol, betaxolol, bisoprolol, carvedilol, droxidopa, esmolol, labetalol, magnesium citrate, metoprolol, nadolol, nebivolol, penbutolol, pindolol, propranolol, sotalol, timolol.    Caution with: armodafinil,betaxolol eye drops, brexpiprazole, bupropion, busulfan, caffeine, carteolol drops, " enzalutaminde, ginseng, green tea, guarana, levobunolol drops, lindane cream, modafinil, afrin nasal spray (oxymetazoline), pamabrom, phenylephrine oral and nasal spray, pseudoephedrine (sudafed), rasgiline, sleegiline, bowel prep, tiagabine, timolol drops,  TRAMADOL due to increased risk of seizures.    The current cheapest place to fill your prescription is at [facility], [facility] or [facility] and is around $30 for 90 tablets.  Occasionally, [facility] and [facility] have price matched, so call around and get the best price for you.  Other pharmacies may charge closer to$70- $100 for the same prescription. You don't have to be a member to use the pharmacy at [facility] currently.  An alternative some patients have tried is using a voucher system through "[employer].  $25 paid on their website gets you a  voucher that can allows you to pick your meds up at [facility] without paying anything more.    Dosing:  We start with half a tablet for the first 2 weeks and if tolerating it without problems, you can take up to one full tablet daily in the morning after breakfast.  For those with evening hunger problems, sometimes half a tablet in the morning and half a tablet around 1-2 pm can be effective, however, risks of nighttime insomnia/restless increase with afternoon dosing so call me at the clinic if considering this regimen or having any issues.  You only have to use the amount effective for you, not to exceed one full tablet.  It can also be used situationaly and does not have to be taken every day. As always, if any questions give us a call at the [facility] telephone:  [phone].

## 2021-06-12 NOTE — PROGRESS NOTES
Assessment & Plan:       1. Strep pharyngitis  Rapid Strep A Screen-Throat swab    penicillin VK (PEN VK) 500 MG tablet   2. Suspected COVID-19 virus infection  Symptomatic COVID-19 Virus (CORONAVIRUS) PCR      Medical Decision Making  Patient presents for acute onset cough and headaches.  Rapid strep test is positive for strep pharyngitis.  Will treat patient with oral antibiotics.  Recommend changing toothbrush after 48 hours.  Continue with over-the-counter analgesics and warm noncaffeinated tea with honey as needed for discomfort.  Also obtained COVID-19 test today due to patient having cough.  Recommend patient self isolate and discussed prevention of spreading illness for possible COVID-19.  Patient otherwise does not appear in respiratory distress and has good oxygen saturation at 98% on room air.  Provided a note for work.  Discussed signs of worsening symptoms and when to follow-up with PCP if no symptom improvement.    Protective precautions were taken which included a facemask, face shield, gown, and gloves.    Patient Instructions   Your rapid strep test was positive today. We will treat with a course of antibiotics. Please complete the full course of antibiotics. You can take your medication with food and with a probiotic such as Culturelle to prevent stomach irritation. You will be contagious for 24 hours following initiation of the medication.    You may use Tylenol or Motrin for pain and fevers.    May drink warm tea, gargle saline solution, or use throat lozenges to sooth throat pain.    Change toothbrush after 48 hours of starting the antibiotic to prevent reinfection.    Watch for resolution of symptoms in the next few days. If you continue to have high fevers, begin to have difficulty swallowing or breathing, notice worsening neck pain, or difficulty moving neck, please return to clinic or present to the emergency room immediately. Otherwise, follow up with your primary care provider as  "needed.    Discharge Instructions for COVID-19 Patients  You have--or may have--COVID-19. Please follow the instructions listed below.   If you have a weakened immune system, discuss with your doctor any other actions you need to take.  How can I protect others?  If you have symptoms (fever, cough, body aches or trouble breathing):    Stay home and away from others (self-isolate) until:  ? At least 10 days have passed since your symptoms started, And   ? You've had no fever--and no medicine that reduces fever--for 1 full day (24 hours), And    ? Your other symptoms have resolved (gotten better).  If you don't show symptoms, but testing showed that you have COVID-19:    Stay home and away from others (self-isolate). Follow the tips under \"How do I self-isolate?\" below for 10 days (20 days if you have a weak immune system).    You don't need to be retested for COVID-19 before going back to school or work. As long as you're fever-free and feeling better, you can go back to school, work and other activities after waiting the 10 or 20 days.   How do I self-isolate?    Stay in your own room, even for meals. Use your own bathroom if you can.    Stay away from others in your home. No hugging, kissing or shaking hands. No visitors.    Don't go to work, school or anywhere else.    Clean \"high touch\" surfaces often (doorknobs, counters, handles). Use household cleaning spray or wipes. You'll find a full list of  on the EPA website: www.epa.gov/pesticide-registration/list-n-disinfectants-use-against-sars-cov-2.    Cover your mouth and nose with a mask or other face covering to avoid spreading germs.    Wash your hands and face often. Use soap and water.    Caregivers in these groups are at risk for severe illness due to COVID-19:  ? People 65 years and older  ? People who live in a nursing home or long-term care facility  ? People with chronic disease (lung, heart, cancer, diabetes, kidney, liver, immunologic)  ? People " who have a weakened immune system, including those who:    Are in cancer treatment    Take medicine that weakens the immune system, such as corticosteroids    Had a bone marrow or organ transplant    Have an immune deficiency    Have poorly controlled HIV or AIDS    Are obese (body mass index of 40 or higher)    Smoke regularly    Caregivers should wear gloves while washing dishes, handling laundry and cleaning bedrooms and bathrooms.    Use caution when washing and drying laundry: Don't shake dirty laundry and use the warmest water setting that you can.    For more tips on managing your health at home, go to www.cdc.gov/coronavirus/2019-ncov/downloads/10Things.pdf.  How can I take care of myself at home?  1. Get lots of rest. Drink extra fluids (unless a doctor has told you not to).    2. Take Tylenol (acetaminophen) for fever or pain. If you have liver or kidney problems, ask your family doctor if it's okay to take Tylenol.     Adults can take either:  ? 650 mg (two 325 mg pills) every 4 to 6 hours, or   ? 1,000 mg (two 500 mg pills) every 8 hours as needed.  ? Note: Don't take more than 3,000 mg in one day. Acetaminophen is found in many medicines (both prescribed and over-the-counter medicines). Read all labels to be sure you don't take too much.   For children, check the Tylenol bottle for the right dose. The dose is based on the child's age or weight.  3. If you have other health problems (like cancer, heart failure, an organ transplant or severe kidney disease): Call your specialty clinic if you don't feel better in the next 2 days.    4. Know when to call 911. Emergency warning signs include:  ? Trouble breathing or shortness of breath  ? Pain or pressure in the chest that doesn't go away  ? Feeling confused like you haven't felt before, or not being able to wake up  ? Bluish-colored lips or face    5. Your doctor may have prescribed a blood thinner medicine. Follow their instructions.  Where can I get more  information?    [facility] - About COVID-19: [email or web address]    CDC - What to Do If You're Sick: www.cdc.gov/coronavirus/2019-ncov/about/steps-when-sick.html    CDC - Ending Home Isolation: www.cdc.gov/coronavirus/2019-ncov/hcp/disposition-in-home-patients.html    CDC - Caring for Someone: www.cdc.gov/coronavirus/2019-ncov/if-you-are-sick/care-for-someone.html    [facility] - Interim Guidance for Hospital Discharge to Home: www.health.[facility].[place]./diseases/coronavirus/hcp/hospdischarge.pdf    [facility] clinical trials (COVID-19 research studies): clinicalaffairs.[facility]-clinical-trials    Below are the COVID-19 hotlines at the [place] [facility] ([facility]). Interpreters are available.  ? For health questions: Call 324-040-0666 or 1-784.751.9464 (7 a.m. to 7 p.m.)  ? For questions about schools and childcare: Call 709-902-5123 or 1-906.962.1979 (7 a.m. to 7 p.m.)    For informational purposes only. Not to replace the advice of your health care provider. Clinically reviewed by the Infection Prevention Team. Copyright   2020 [facility]. All rights reserved. Takes 959711 - REV 08/04/20.              Subjective:       History provided by the patient.  Required a phone .  Vernell Christensen is a 25 y.o. female here for evaluation of cough.  Onset of symptoms was 2 to 3 days ago.  Associated symptoms include headache and a slight sore throat.  Patient denies shortness of breath and fevers.  She has no history of asthma.  No known recent contacts with COVID-19 or strep throat.  Patient has been taking doses of Tylenol with some temporary relief of symptoms.    The following portions of the patient's history were reviewed and updated as appropriate: allergies, current medications and problem list.    Review of Systems  Pertinent items are noted in HPI.     Allergies  No Known Allergies    Family History   Problem Relation Age of Onset     Hypertension Maternal  Grandmother      Diabetes Maternal Grandmother      Diabetes Maternal Grandfather      Diabetes Paternal Grandmother      Diabetes Maternal Aunt      Diabetes Maternal Aunt        Social History     Socioeconomic History     Marital status:      Spouse name: None     Number of children: None     Years of education: None     Highest education level: None   Occupational History     None   Social Needs     Financial resource strain: None     Food insecurity     Worry: None     Inability: None     Transportation needs     Medical: None     Non-medical: None   Tobacco Use     Smoking status: Never Smoker     Smokeless tobacco: Never Used   Substance and Sexual Activity     Alcohol use: No     Frequency: Never     Drug use: No     Sexual activity: Yes     Partners: Male     Birth control/protection: None   Lifestyle     Physical activity     Days per week: None     Minutes per session: None     Stress: None   Relationships     Social connections     Talks on phone: None     Gets together: None     Attends Mandaen service: None     Active member of club or organization: None     Attends meetings of clubs or organizations: None     Relationship status: None     Intimate partner violence     Fear of current or ex partner: None     Emotionally abused: None     Physically abused: None     Forced sexual activity: None   Other Topics Concern     None   Social History Narrative    6/17/20  with 4 children. Romansh speaking         Objective:       /81   Pulse 83   Temp 98.9  F (37.2  C) (Oral)   Resp 16   SpO2 98%   General appearance: alert, appears stated age, cooperative, no distress and non-toxic  Head: Normocephalic, without obvious abnormality, atraumatic  Ears: normal TM's and external ear canals both ears  Nose: no discharge  Throat: Mild tonsillar swelling with erythema, no exudate, mucous membranes moist, lips and tongue normal  Neck: mild anterior cervical adenopathy and supple, symmetrical,  trachea midline  Lungs: clear to auscultation bilaterally  Heart: regular rate and rhythm, S1, S2 normal, no murmur, click, rub or gallop     Lab Results    Recent Results (from the past 24 hour(s))   Rapid Strep A Screen-Throat swab    Specimen: Throat   Result Value Ref Range    Rapid Strep A Antigen Group A Strep detected (!) No Group A Strep detected, presumptive negative     I personally reviewed these results and discussed findings with the patient.

## 2021-06-12 NOTE — PATIENT INSTRUCTIONS - HE
"Your rapid strep test was positive today. We will treat with a course of antibiotics. Please complete the full course of antibiotics. You can take your medication with food and with a probiotic such as Culturelle to prevent stomach irritation. You will be contagious for 24 hours following initiation of the medication.    You may use Tylenol or Motrin for pain and fevers.    May drink warm tea, gargle saline solution, or use throat lozenges to sooth throat pain.    Change toothbrush after 48 hours of starting the antibiotic to prevent reinfection.    Watch for resolution of symptoms in the next few days. If you continue to have high fevers, begin to have difficulty swallowing or breathing, notice worsening neck pain, or difficulty moving neck, please return to clinic or present to the emergency room immediately. Otherwise, follow up with your primary care provider as needed.    Discharge Instructions for COVID-19 Patients  You have--or may have--COVID-19. Please follow the instructions listed below.   If you have a weakened immune system, discuss with your doctor any other actions you need to take.  How can I protect others?  If you have symptoms (fever, cough, body aches or trouble breathing):    Stay home and away from others (self-isolate) until:  ? At least 10 days have passed since your symptoms started, And   ? You've had no fever--and no medicine that reduces fever--for 1 full day (24 hours), And    ? Your other symptoms have resolved (gotten better).  If you don't show symptoms, but testing showed that you have COVID-19:    Stay home and away from others (self-isolate). Follow the tips under \"How do I self-isolate?\" below for 10 days (20 days if you have a weak immune system).    You don't need to be retested for COVID-19 before going back to school or work. As long as you're fever-free and feeling better, you can go back to school, work and other activities after waiting the 10 or 20 days.   How do I " "self-isolate?    Stay in your own room, even for meals. Use your own bathroom if you can.    Stay away from others in your home. No hugging, kissing or shaking hands. No visitors.    Don't go to work, school or anywhere else.    Clean \"high touch\" surfaces often (doorknobs, counters, handles). Use household cleaning spray or wipes. You'll find a full list of  on the EPA website: www.epa.gov/pesticide-registration/list-n-disinfectants-use-against-sars-cov-2.    Cover your mouth and nose with a mask or other face covering to avoid spreading germs.    Wash your hands and face often. Use soap and water.    Caregivers in these groups are at risk for severe illness due to COVID-19:  ? People 65 years and older  ? People who live in a nursing home or long-term care facility  ? People with chronic disease (lung, heart, cancer, diabetes, kidney, liver, immunologic)  ? People who have a weakened immune system, including those who:    Are in cancer treatment    Take medicine that weakens the immune system, such as corticosteroids    Had a bone marrow or organ transplant    Have an immune deficiency    Have poorly controlled HIV or AIDS    Are obese (body mass index of 40 or higher)    Smoke regularly    Caregivers should wear gloves while washing dishes, handling laundry and cleaning bedrooms and bathrooms.    Use caution when washing and drying laundry: Don't shake dirty laundry and use the warmest water setting that you can.    For more tips on managing your health at home, go to www.cdc.gov/coronavirus/2019-ncov/downloads/10Things.pdf.  How can I take care of myself at home?  1. Get lots of rest. Drink extra fluids (unless a doctor has told you not to).    2. Take Tylenol (acetaminophen) for fever or pain. If you have liver or kidney problems, ask your family doctor if it's okay to take Tylenol.     Adults can take either:  ? 650 mg (two 325 mg pills) every 4 to 6 hours, or   ? 1,000 mg (two 500 mg pills) every 8 " hours as needed.  ? Note: Don't take more than 3,000 mg in one day. Acetaminophen is found in many medicines (both prescribed and over-the-counter medicines). Read all labels to be sure you don't take too much.   For children, check the Tylenol bottle for the right dose. The dose is based on the child's age or weight.  3. If you have other health problems (like cancer, heart failure, an organ transplant or severe kidney disease): Call your specialty clinic if you don't feel better in the next 2 days.    4. Know when to call 911. Emergency warning signs include:  ? Trouble breathing or shortness of breath  ? Pain or pressure in the chest that doesn't go away  ? Feeling confused like you haven't felt before, or not being able to wake up  ? Bluish-colored lips or face    5. Your doctor may have prescribed a blood thinner medicine. Follow their instructions.  Where can I get more information?    [facility] - About COVID-19: [email or web address]    CDC - What to Do If You're Sick: www.cdc.gov/coronavirus/2019-ncov/about/steps-when-sick.html    CDC - Ending Home Isolation: www.cdc.gov/coronavirus/2019-ncov/hcp/disposition-in-home-patients.html    CDC - Caring for Someone: www.cdc.gov/coronavirus/2019-ncov/if-you-are-sick/care-for-someone.html    [facility] - Interim Guidance for Hospital Discharge to Home: www.health.[facility].mn.us/diseases/coronavirus/hcp/hospdischarge.pdf    [facility] clinical trials (COVID-19 research studies): clinicalaffairs.[facility]/umn-clinical-trials    Below are the COVID-19 hotlines at the [facility] ([facility]). Interpreters are available.  ? For health questions: Call 608-553-9318 or 1-626.217.9597 (7 a.m. to 7 p.m.)  ? For questions about schools and childcare: Call 047-173-3633 or 1-358.528.4167 (7 a.m. to 7 p.m.)    For informational purposes only. Not to replace the advice of your health care provider. Clinically reviewed by the Infection Prevention Team.  Copyright   2020 [facility]. All rights reserved. [employer] [id number] - REV 08/04/20.

## 2021-06-13 NOTE — PATIENT INSTRUCTIONS - HE
You have tendinitis of your right hand, likely due to an overuse injury from the work you do at your job.  I would recommend continuing to wear the brace that you have been wearing as well as resting her hand as much as possible over the next week.  I have sent over a prescription to your pharmacy for ibuprofen.  You may take this every 6 hours as needed with food.  Please follow-up with your primary doctor if your symptoms are getting worse or not improving.

## 2021-06-13 NOTE — PROGRESS NOTES
"Chief Complaint   Patient presents with     Possible Pregnancy       HPI:  Vernell Christensen is a 22 y.o. female  with  and cousin presents requesting pregnancy test.  Patient's last menstrual period was 2017 (exact date).   Normal: yes  Contraception: none  Home Pregnancy Test:  10/10/17--positive     Last Pregnancy: 2014  Symptoms: nausea started two weeks ago    Tobacco use: none  ETOH use:  none    Planned: yes    MEDICATIONS:  No current outpatient prescriptions on file prior to visit.     No current facility-administered medications on file prior to visit.          ALLERGIES:  No Known Allergies    SOCIAL HISTORY:  History   Smoking Status     Never Smoker   Smokeless Tobacco     Never Used         EXAM:  Vitals:    10/16/17 1933   BP: 114/72   Patient Site: Left Arm   Patient Position: Sitting   Cuff Size: Adult Regular   Pulse: 88   Resp: 16   Temp: 99.2  F (37.3  C)   TempSrc: Oral   Weight: 169 lb 4 oz (76.8 kg)   Height: 5' 3.78\" (1.62 m)       GEN:  NAD  ABD:  FUNDUS: not palpable    FHT's by doptone: none    LABS:  Results for orders placed or performed in visit on 10/16/17   Pregnancy (Beta-hCG, Qual), Urine   Result Value Ref Range    Pregnancy Test, Urine Positive (!) Negative       ASSESSMENT/PLAN:    1. Absence of menstruation  Pregnancy (Beta-hCG, Qual), Urine      Dating by LMP:  EGA: 4+6 weeks    EDC: 18    Start prenatal vitamins.  Healthy diet  Small, frequent meals if nauseated.  No medications other than tylenol unless okayed by doctor  Notify clinic if bleeding or pain.  Set up 1st OB visit.     The following portions of the patient's history were reviewed and updated as appropriate: allergies, current medications, past family history, past medical history, past social history, past surgical history and problem list.         Jeffrey Cavanaugh MD   10/16/2017                         "

## 2021-06-13 NOTE — PROGRESS NOTES
Assessment:     1. Right hand tendonitis  ibuprofen (ADVIL,MOTRIN) 600 MG tablet          Plan:     Patient with symptoms consistent with right hand tendinitis due to chronic repetitive motion and overuse at her workplace.  Recommend relative rest, ibuprofen, and a wrist brace.  Note given for work to be off until 11/16/2020.  She will follow-up her primary care provider if symptoms are getting worse or not improving or if she is unable to return to work.      Patient Instructions   You have tendinitis of your right hand, likely due to an overuse injury from the work you do at your job.  I would recommend continuing to wear the brace that you have been wearing as well as resting her hand as much as possible over the next week.  I have sent over a prescription to your pharmacy for ibuprofen.  You may take this every 6 hours as needed with food.  Please follow-up with your primary doctor if your symptoms are getting worse or not improving.    Subjective:       25 y.o. Surinamese-speaking female seen with a Surinamese phone  presents for evaluation of right hand pain over her wrist and base of her thumb.  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  It is been bothering some for her for several months now but she has been able to work through the pain.  Over the past several days it is gotten increasingly worse to the point that she is unable to do her job.  She has tried taking Tylenol without significant relief of her pain.  She has been wearing a wrist brace which helps somewhat.  She denies any other injury.    Patient Active Problem List   Diagnosis     Cholestasis during pregnancy     IUD (intrauterine device) in place     Class 2 obesity     Drug-induced weight gain     Insulin resistance     Symptomatic cholelithiasis       Past Medical History:   Diagnosis Date     Diabetes mellitus (H)        Past Surgical History:   Procedure  Laterality Date     LAPAROSCOPIC CHOLECYSTECTOMY N/A 9/4/2020    Procedure: CHOLECYSTECTOMY, LAPAROSCOPIC;  Surgeon: Joao Willett MD;  Location: [facility] Main OR;  Service: General     NO PAST SURGERIES         Current Outpatient Medications on File Prior to Visit   Medication Sig Dispense Refill     acetaminophen (TYLENOL) 500 MG tablet Take 1-2 tablets (500-1,000 mg total) by mouth every 4 (four) hours as needed.  0     copper (PARAGARD T 380A) 380 square mm IUD IUD 1 each by Intrauterine route once. Lot: [id number]  Exp: 4/30/2025  NDC: [address]-4       metFORMIN (GLUCOPHAGE) 500 MG tablet Take 1 tablet (500 mg total) by mouth 2 (two) times a day with meals. 180 tablet 3     omeprazole (PRILOSEC) 20 MG capsule Take 1 capsule (20 mg total) by mouth daily before breakfast. 30 capsule 0     oxyCODONE-acetaminophen (PERCOCET/ENDOCET) 5-325 mg per tablet Take 1 tablet by mouth every 6 (six) hours as needed. 20 tablet 0     penicillin VK (PEN VK) 500 MG tablet Take 1 tablet (500 mg total) by mouth 2 (two) times a day for 10 days. 20 tablet 0     phentermine (ADIPEX-P) 37.5 mg tablet Take 18.75 mg by mouth daily before breakfast.       acetaminophen (TYLENOL) 325 MG tablet Take 2 tablets (650 mg total) by mouth every 4 (four) hours as needed for pain. 100 tablet 2     cholecalciferol, vitamin D3, 125 mcg (5,000 unit) capsule Take 1 capsule (5,000 Units total) by mouth daily. 90 capsule 3     senna-docusate (PERICOLACE) 8.6-50 mg tablet Take 1 tablet by mouth 2 (two) times a day.  0     No current facility-administered medications on file prior to visit.        No Known Allergies    Family History   Problem Relation Age of Onset     Hypertension Maternal Grandmother      Diabetes Maternal Grandmother      Diabetes Maternal Grandfather      Diabetes Paternal Grandmother      Diabetes Maternal Aunt      Diabetes Maternal Aunt        Social History     Socioeconomic History     Marital status:      Spouse name:  None     Number of children: None     Years of education: None     Highest education level: None   Occupational History     None   Social Needs     Financial resource strain: None     Food insecurity     Worry: None     Inability: None     Transportation needs     Medical: None     Non-medical: None   Tobacco Use     Smoking status: Never Smoker     Smokeless tobacco: Never Used   Substance and Sexual Activity     Alcohol use: No     Frequency: Never     Drug use: No     Sexual activity: Yes     Partners: Male     Birth control/protection: None   Lifestyle     Physical activity     Days per week: None     Minutes per session: None     Stress: None   Relationships     Social connections     Talks on phone: None     Gets together: None     Attends Spiritism service: None     Active member of club or organization: None     Attends meetings of clubs or organizations: None     Relationship status: None     Intimate partner violence     Fear of current or ex partner: None     Emotionally abused: None     Physically abused: None     Forced sexual activity: None   Other Topics Concern     None   Social History Narrative    6/17/20  with 4 children. Icelandic speaking         Review of Systems  A 12 point comprehensive review of systems was negative except as noted.      Objective:     Vitals:    11/11/20 1143   BP: 115/75   Pulse: 82   Resp: 16   Temp: 99.5  F (37.5  C)   SpO2: 98%      General appearance: alert, appears stated age and cooperative   Extremities: Patient noted to have tenderness at the base of her thumb and over her thenar eminence.  Pain is exacerbated with movement of her thumb.  There is no swelling or ecchymosis noted.  There is no deformity.  No bony tenderness.  He is neurovascularly intact.  She has increased tenderness at the base of her thumb with resistance to flexion of her thumb.        This note has been dictated using voice recognition software. Any grammatical or context distortions are  unintentional and inherent to the software

## 2021-06-13 NOTE — TELEPHONE ENCOUNTER
sean Arana mgr phoned to request pt last OV notes and letter for work restrictions.  Printed off note from 12/6/20 and letter 12/15/20 and am faxing now.  Thanks.

## 2021-06-13 NOTE — PROGRESS NOTES
"S:  Vernell Christensen is a 25 y.o. female who comes to the clinic today for  Workman's comp.    Date of injury was 11/8/2020.  She has since used her brace daily, but her work requires her to use 2 hands, and her pain flares.    She went to work as of yesterday.  She has been going intermittently due to flare ups of pain.  She has been using ibuprofen.  She has been having some intermittent numbness in the right hand.  The pain now sometimes goes up her right arm into her shoulder when she uses her arm a lot.   She is right hand dominant.      I reviewed the pertinent family, social, surgical, medical history.    She is accompanied today by a care manager from the workplace.      O:  /70   Pulse 82   Temp 98.2  F (36.8  C) (Oral)   Resp 18   Ht 5' 3\" (1.6 m)   Wt 191 lb 4 oz (86.8 kg)   LMP 11/08/2020 (Exact Date)   BMI 33.88 kg/m    Gen:  nad  Right hand:  Swelling is improved. Tenderness along the right thenar eminence is improved.  Sensation is intact.  She has palpable tenderness over her extensor tendons that extend up to her elbow.  No pain or tenderness within the elbow itself.  She is full range of motion her elbow, wrist, hand.    Patient Active Problem List   Diagnosis     Cholestasis during pregnancy     IUD (intrauterine device) in place     Class 2 obesity     Drug-induced weight gain     Insulin resistance     Symptomatic cholelithiasis     Current Outpatient Medications on File Prior to Visit   Medication Sig Dispense Refill     acetaminophen (TYLENOL) 325 MG tablet Take 2 tablets (650 mg total) by mouth every 4 (four) hours as needed for pain. 100 tablet 2     acetaminophen (TYLENOL) 500 MG tablet Take 1-2 tablets (500-1,000 mg total) by mouth every 4 (four) hours as needed.  0     cholecalciferol, vitamin D3, 125 mcg (5,000 unit) capsule Take 1 capsule (5,000 Units total) by mouth daily. 90 capsule 3     copper (PARAGARD T 380A) 380 square mm IUD IUD 1 each by Intrauterine route once. Lot: " [id number]  Exp: 4/30/2025  NDC: [address]-2       ibuprofen (ADVIL,MOTRIN) 600 MG tablet Take 1 tablet (600 mg total) by mouth every 6 (six) hours as needed for pain. 30 tablet 0     metFORMIN (GLUCOPHAGE) 500 MG tablet Take 1 tablet (500 mg total) by mouth 2 (two) times a day with meals. 180 tablet 3     omeprazole (PRILOSEC) 20 MG capsule Take 1 capsule (20 mg total) by mouth daily before breakfast. 30 capsule 0     oxyCODONE-acetaminophen (PERCOCET/ENDOCET) 5-325 mg per tablet Take 1 tablet by mouth every 6 (six) hours as needed. 20 tablet 0     phentermine (ADIPEX-P) 37.5 mg tablet Take 18.75 mg by mouth daily before breakfast.       senna-docusate (PERICOLACE) 8.6-50 mg tablet Take 1 tablet by mouth 2 (two) times a day.  0     No current facility-administered medications on file prior to visit.           No results found for this or any previous visit (from the past 48 hour(s)).     No images are attached to the encounter or orders placed in the encounter.       Assessment/Plan:  1. Tendonitis of wrist, right  This is a repetitive injury likely caused by her work.  I have asked her to be off completely for the next 2 weeks.  She will continue to wear her brace at home.  She will continue with ibuprofen at home.  If her symptoms are improving at 2 weeks then she may return to we work for 3 weeks using her brace.  At that time she will need to continue with a more limited work so as not to over strain her right hand again.  If after 3 weeks her symptoms have not returned she may return to work without limitations.  This was discussed with both her and the case management worker who is here today.  The patient will notify me via [email or web address] of her status in 2 weeks.          Susan Bejarano   12/7/2020 2:30 PM

## 2021-06-13 NOTE — TELEPHONE ENCOUNTER
RN cannot approve Refill Request    RN can NOT refill this medication med is not covered by policy/route to provider. Last office visit: 12/7/2020 Susan Bejarano MD Last Physical: Visit date not found Last MTM visit: Visit date not found Last visit same specialty: 12/7/2020 Susan Bejarano MD.  Next visit within 3 mo: Visit date not found  Next physical within 3 mo: Visit date not found      Lilliam Groves, [facility] Triage/Med Refill 12/19/2020    Requested Prescriptions   Pending Prescriptions Disp Refills     ibuprofen (ADVIL,MOTRIN) 600 MG tablet [Pharmacy Med Name: IBUPROFEN 600MG TABLETS] 30 tablet 0     Sig: TAKE 1 TABLET(600 MG) BY MOUTH EVERY 6 HOURS AS NEEDED FOR PAIN       There is no refill protocol information for this order

## 2021-06-13 NOTE — TELEPHONE ENCOUNTER
Component      Latest Ref Rng & Units 9/4/2020   Sodium      136 - 145 mmol/L 137   Potassium      3.5 - 5.0 mmol/L 3.7   Chloride      98 - 107 mmol/L 104   CO2      22 - 31 mmol/L 23   Anion Gap, Calculation      5 - 18 mmol/L 10   Glucose      70 - 125 mg/dL 127 (H)   BUN      8 - 22 mg/dL 14   Creatinine      0.60 - 1.10 mg/dL 0.73   GFR MDRD Af Amer      >60 mL/min/1.73m2 >60   GFR MDRD Non Af Amer      >60 mL/min/1.73m2 >60   Bilirubin, Total      0.0 - 1.0 mg/dL 0.3   Calcium      8.5 - 10.5 mg/dL 9.1   Protein, Total      6.0 - 8.0 g/dL 7.7   ALBUMIN      3.5 - 5.0 g/dL 3.9   Alkaline Phosphatase      45 - 120 U/L 125 (H)   AST      0 - 40 U/L 42 (H)   ALT      0 - 45 U/L 41

## 2021-06-13 NOTE — PROGRESS NOTES
Assessment & Plan:       1. Lateral epicondylitis of right elbow        Medical Decision Making  Patient returns to walk-in care clinic with ongoing right wrist and right elbow pain.  She was told to follow-up with a primary care provider, but instead return to the walk-in care clinic.  She continues to have the same symptoms, and I see no signs that the symptoms of muscle weakness other signs of cellulitis.  Patient symptoms are most consistent with lateral epicondylitis she notes pain rating up to the right elbow with supination/pronation.  Patient is requesting a note for work.  Discussed in length that she is still able to work if needed and working through the discomfort did not cause any reversible damage, but is instead important to rest as much as possible.  She still insisted on a note for work to rest for 1 more week.  States that she will then follow-up with primary care as needed for work restrictions.  Strongly encouraged using cold compresses, elevation, and ibuprofen.      Subjective:       Vernell Christensen is a 25 y.o. female here for evaluation of patient returns to clinic with ongoing wrist and elbow pain.  She was seen 1 week ago in the walk-in care clinic and diagnosed with tendinitis.  Patient notes pain in the right wrist rating up to the right elbow that worsens with movement.  She does note some occasional tingling and numbness in the fingers, no significant reduction in muscle strength.  Patient states her symptoms are improving though she went back to work on Thursday.  She will do some lifting and this then worsened her symptoms last night.  She noticed swelling in the wrist.  Patient has been wearing the wrist brace but otherwise not been using cold compresses or ibuprofen.    The following portions of the patient's history were reviewed and updated as appropriate: allergies, current medications and problem list.    Review of Systems  Pertinent items are noted in HPI.     Allergies  No  Known Allergies    Family History   Problem Relation Age of Onset     Hypertension Maternal Grandmother      Diabetes Maternal Grandmother      Diabetes Maternal Grandfather      Diabetes Paternal Grandmother      Diabetes Maternal Aunt      Diabetes Maternal Aunt        Social History     Socioeconomic History     Marital status:      Spouse name: None     Number of children: None     Years of education: None     Highest education level: None   Occupational History     None   Social Needs     Financial resource strain: None     Food insecurity     Worry: None     Inability: None     Transportation needs     Medical: None     Non-medical: None   Tobacco Use     Smoking status: Never Smoker     Smokeless tobacco: Never Used   Substance and Sexual Activity     Alcohol use: No     Frequency: Never     Drug use: No     Sexual activity: Yes     Partners: Male     Birth control/protection: None   Lifestyle     Physical activity     Days per week: None     Minutes per session: None     Stress: None   Relationships     Social connections     Talks on phone: None     Gets together: None     Attends Latter day service: None     Active member of club or organization: None     Attends meetings of clubs or organizations: None     Relationship status: None     Intimate partner violence     Fear of current or ex partner: None     Emotionally abused: None     Physically abused: None     Forced sexual activity: None   Other Topics Concern     None   Social History Narrative    6/17/20  with 4 children. Monegasque speaking         Objective:       /66 (Patient Site: Left Arm, Patient Position: Sitting, Cuff Size: Adult Large)   Pulse 67   Temp 98.8  F (37.1  C) (Oral)   Resp 14   Wt 191 lb (86.6 kg)   LMP 11/08/2020 (Exact Date)   SpO2 98%   BMI 33.83 kg/m    General appearance: alert, appears stated age, cooperative, no distress and non-toxic  Extremities: Right upper extremity: Full range of motion of all  joints; no obvious trauma or deformity  Pulses: 2+ and symmetric  Skin: No swelling, ecchymosis, erythema or increased warmth to touch over the right upper extremity  Neurologic: Strength is intact and symmetrical, sensation light touch is intact and symmetrical

## 2021-06-13 NOTE — PROGRESS NOTES
"S:  Vernell Christensen is a 25 y.o. female who comes to the clinic today for  1.  Work comp injury on 11/14/2020.  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 She tried to change her working that day, but worked hard again and the pain worsened over the next 2 days followed by significant swelling in her right hand.  She is right handed.  It hurts to  things. She was evaluated and placed on a leave with a wrist brace.  Her pain improved with nsaids, ice and rest.  She then returned to work and her sx returned alongwith the swelling.  No history of similar.    Her swelling has gone down now.      I reviewed the pertinent family, social, surgical, medical history.      O:  /68   Pulse 84   Temp 98.4  F (36.9  C) (Oral)   Resp 16   Ht 5' 3\" (1.6 m)   Wt 189 lb (85.7 kg)   LMP 11/08/2020 (Exact Date)   BMI 33.48 kg/m    Gen:  Nad, alert  Tenderness and swelling along right thenar eminence, with some tenderness extending superiorly along her right pollicus longus.  No anatomic snuffbox tenderness.  No erythema or warmth noted.  Full strength in her fingers . Full rom in hand.      Patient Active Problem List   Diagnosis     Cholestasis during pregnancy     IUD (intrauterine device) in place     Class 2 obesity     Drug-induced weight gain     Insulin resistance     Symptomatic cholelithiasis     Current Outpatient Medications on File Prior to Visit   Medication Sig Dispense Refill     acetaminophen (TYLENOL) 325 MG tablet Take 2 tablets (650 mg total) by mouth every 4 (four) hours as needed for pain. 100 tablet 2     acetaminophen (TYLENOL) 500 MG tablet Take 1-2 tablets (500-1,000 mg total) by mouth every 4 (four) hours as needed.  0     cholecalciferol, vitamin D3, 125 mcg (5,000 unit) capsule Take 1 capsule (5,000 Units total) by mouth daily. 90 capsule 3     copper (PARAGARD T 380A) 380 square mm IUD IUD 1 each by Intrauterine route once. Lot: " [id number]  Exp: 4/30/2025  NDC: [address]-6       ibuprofen (ADVIL,MOTRIN) 600 MG tablet Take 1 tablet (600 mg total) by mouth every 6 (six) hours as needed for pain. 30 tablet 0     metFORMIN (GLUCOPHAGE) 500 MG tablet Take 1 tablet (500 mg total) by mouth 2 (two) times a day with meals. 180 tablet 3     omeprazole (PRILOSEC) 20 MG capsule Take 1 capsule (20 mg total) by mouth daily before breakfast. 30 capsule 0     phentermine (ADIPEX-P) 37.5 mg tablet Take 18.75 mg by mouth daily before breakfast.       senna-docusate (PERICOLACE) 8.6-50 mg tablet Take 1 tablet by mouth 2 (two) times a day.  0     oxyCODONE-acetaminophen (PERCOCET/ENDOCET) 5-325 mg per tablet Take 1 tablet by mouth every 6 (six) hours as needed. 20 tablet 0     No current facility-administered medications on file prior to visit.           No results found for this or any previous visit (from the past 48 hour(s)).     No images are attached to the encounter or orders placed in the encounter.       Assessment/Plan:  1. Tendonitis of wrist, right  Will remain on restrictions for 3 weeks.  Place in brace with thumb spica for the next 3 weeks.    Then can return to working with wrist brace without thumb spica.    Use ice, nsaids.    - Wrist/Arm DME:          Susan Bejarano   11/23/2020 5:53 PM

## 2021-06-14 NOTE — PROGRESS NOTES
"   Vernell Christensen is a 25 y.o. female who is being evaluated via a billable telephone visit.      The patient has been notified of following:     \"This telephone visit will be conducted via a call between you and your physician/provider. We have found that certain health care needs can be provided without the need for a physical exam.  This service lets us provide the care you need with a short phone conversation.  If a prescription is necessary we can send it directly to your pharmacy.  If lab work is needed we can place an order for that and you can then stop by our lab to have the test done at a later time.    Telephone visits are billed at different rates depending on your insurance coverage. During this emergency period, for some insurers they may be billed the same as an in-person visit.  Please reach out to your insurance provider with any questions.    If during the course of the call the physician/provider feels a telephone visit is not appropriate, you will not be charged for this service.\"    Patient has given verbal consent to a Telephone visit? Yes    Patient would like to receive their AVS by AVS Preference: Natan.    Additional provider notes:    Medical  Weight Loss Follow-Up Diet Evaluation  Assessment:  Vernell is presenting today for a follow up weight management nutrition consultation. Pt has had an initial appointment with Dr. Mayfield  Weight loss medication: Phentermine.  Pt's No data recorded  BMI: There is no height or weight on file to calculate BMI.  Patient weight not recorded    Estimated RMR (Charlotte-St Jeor equation): 1628 kcals x  1.3 (lightly active) = 2117 kcals (for weight maintenance)  Recommended Protein Intake: 60-80 grams of protein/day  Patient Active Problem List:  Patient Active Problem List   Diagnosis     Cholestasis during pregnancy     IUD (intrauterine device) in place     Class 2 obesity     Drug-induced weight gain     Insulin resistance     Symptomatic cholelithiasis "     Diabetes: No    Progress on goals from last visit:   1. Continue eating 3x per days including a protein source at each meal; keep carbohydrate to less than 25% of meal - not met  2. Continue drinking lot of water daily, at least 1.5 liters - met  3. Continue exercise routine of 4-5 days per week - met    Dietary Recall:  Breakfast: [place] milk smoothie with banana  Snack: a fruit  Lunch: Chicken or beef with spinach or broccoli  Dinner: Sometimes another smoothie  Beverages:   Water: 5 bottles   Once in a while a soda  Exercise:   She is taking a workout class that is dancing and cardio, 6 days per week  Nutrition Diagnosis:    Overweight/Obesity (NC 3.3) related to overeating and poor lifestyle habits as evidenced by BMI 35  Intervention:  1. Food and/or nutrient delivery: Recommend continuing with current eating plan of 3 meals per day of 25 g of protein, 1830-9377 kcals per day  2. Nutrition counseling: Encouraged continued exercise or trying to keep a food journal    Monitoring/Evaluation:    Goals:  4. Continue eating 3x per days including a protein source at each meal; keep carbohydrate to less than 25% of meal. Consider keeping a food journal to monitor food intake or monitor calorie intake to be 8184-8622 kcals per day  5. Continue drinking lot of water daily, at least 1.5 liters  6. Continue exercise routine of 4-5 days per week    Patient to follow up in 1 months(s) with bariatrician and 2 month(s) with RD    Phone call duration: 20 minutes    Claribel Escamilla RD

## 2021-06-14 NOTE — PROGRESS NOTES
Assessment: /    Plan:    1. Vomiting during pregnancy  pyridoxine, vitamin B6, (B-6) 25 MG tablet    diphenhydrAMINE (BENADRYL) 25 mg capsule       She will try vitamin B6 and Benadryl to decrease nausea.  She will also try ginger ale in small amounts.  She has an appointment with Dr. Bejarano on 11/21/17.  Recheck sooner if any problems.  Patient was seen with professional , Sukhi Rae.      Subjective:    HPI:  Vernell Christensen is a 22-year-old female presenting for follow-up of vomiting.  This has been occurring for about 1 week.  She is 7 weeks pregnant.  She was seen at [facility] ER on 11/5/17.  Ultrasound demonstrated pregnancy at 7 weeks gestation, with GABBIE of 6/24/18.  She was treated for cystitis with nitrofurantoin.  Urine culture demonstrated 10,000-50,000 colonies of normal urogenital maria m.  She is taking a prenatal vitamin.    Nausea occurs at all times of the day and evening.  She takes small sips of liquids, and sometimes vomits anyway.  She continues to make urine normally.      Review of Systems: No fever or rash.      Current Outpatient Prescriptions   Medication Sig Dispense Refill     diphenhydrAMINE (BENADRYL) 25 mg capsule Take 1 capsule (25 mg total) by mouth 4 (four) times a day as needed (nausea). 100 capsule 3     pyridoxine, vitamin B6, (B-6) 25 MG tablet Take 1 tablet (25 mg total) by mouth 4 (four) times a day as needed. 100 tablet 3     No current facility-administered medications for this visit.          Objective:    Vitals:    11/07/17 0922   BP: 106/64   Pulse: 90   Resp: 16   Temp: 98.3  F (36.8  C)   SpO2: 98%       Gen:  NAD, VSS  Lungs:  normal  Heart:  normal  Abdomen:  No HSM, mass or tenderness  Back without CVA tenderness        ADDITIONAL HISTORY SUMMARIZED (2): Reviewed ER note.  DECISION TO OBTAIN EXTRA INFORMATION (1): None.   RADIOLOGY TESTS (1): Reviewed ultrasound on care everywhere.  LABS (1): Reviewed urine culture.  MEDICINE TESTS (1):  None.  INDEPENDENT REVIEW (2 each): None.     Total Data Points: 4

## 2021-06-14 NOTE — PROGRESS NOTES
"Vernell Christensen is a 25 y.o. female who is being evaluated via a billable telephone visit.      The patient has been notified of following:     \"This telephone visit will be conducted via a call between you and your physician/provider. We have found that certain health care needs can be provided without the need for a physical exam.  This service lets us provide the care you need with a short phone conversation.  If a prescription is necessary we can send it directly to your pharmacy.  If lab work is needed we can place an order for that and you can then stop by our lab to have the test done at a later time.    Telephone visits are billed at different rates depending on your insurance coverage. During this emergency period, for some insurers they may be billed the same as an in-person visit.  Please reach out to your insurance provider with any questions.    If during the course of the call the physician/provider feels a telephone visit is not appropriate, you will not be charged for this service.\"    Patient has given verbal consent to a Telephone visit? Yes    What phone number would you like to be contacted at? [phone]    Patient would like to receive their AVS by AVS Preference: Natan.  inteviewed w/ , phone visit. 20 minutes.  Additional provider notes: was going well and hit plateau now around 187 lbs, down a few from our last visit at the end of August. Cholecystectomy in September. Recovery has been good diet change away from greasy foods since.  Exercising well 6 times weekly, watching carbs well. Using metfomin OK. Reviewed RMR data and medications, will refill phentermine (she ran out about a month ago). Taking her vitamin D well and we'll recheck levels in spring w/ A1c/CMP and B12. RMR 1628kcal.    Plan:  1. Great work with consistent exercise. Aim for 25 grams of lean protein at 3 meals daily, every 5 hours. Hydrate well and if workouts are lasting longer than an hour, refuel " with 12-15g of lean protein and some complex carbohydrates.    2. Phentermine can restart, refill sent to [facility]. Call if too expensive. Use HALF a tablet daily for 3 weeks then you could increase if tolerated to a full tablet if tolerated/needed. Reduce dose if too stimulated/anxious/or trouble sleeping.    3. Follow up with dietician, keep food journal the next 2 weeks and keep track of exercise duration/intensity to help break through this plateau in the higher 180s.   You could discuss meal replacement options/liquid diets if needed as well.    4. A1c was improving last time, continue metformin and we'll check A1c, vitamin D, B12 and comprehensive metabolic panel in the spring.    5. Recheck with me in 6-8 weeks.      Phone call duration: 20 minutes  JORGE Childs MD

## 2021-06-14 NOTE — PROGRESS NOTES
PRENATAL VISIT   FIRST OBSTETRICAL EXAM - OB    Assessment / Impression     Nausea with early pregnancy.  History of irregular menses with her LMP being abnormal  Normal first prenatal visit at 10w0d  Discussed orientation, general information, lifestyle, nutrition, exercise,warning signs, resources, lab testing, risk screening and discussed cystic fibrosis screening with patient.  Questions answered.    Plan:     Referral for dentist  US to establish dates   Initial labs drawn.  Prenatal vitamins.  Problem list reviewed and updated.  Genetic screening test options discussed:  Patient elects to decline all testing  Role of ultrasound in pregnancy discussed; fetal survey: requested.  Follow up: Return in 4 weeks (on 2017).   Advised to take precautions when traveling back to [place] this upcoming week to prevent blood clots.        Subjective:    Vernell Christensen is a 22 y.o.  here today for her First Obstetrical Exam.   OB History    Para Term  AB Living   3 2 2   2   SAB TAB Ectopic Multiple Live Births             # Outcome Date GA Lbr Eugene/2nd Weight Sex Delivery Anes PTL Lv   3 Current            2 Term            1 Term                   Expected Date of Delivery: 2018, by Last Menstrual Period    No past medical history on file.  No past surgical history on file.  Social History   Substance Use Topics     Smoking status: Never Smoker     Smokeless tobacco: Never Used     Alcohol use No     Current Outpatient Prescriptions   Medication Sig Dispense Refill     PNV,calcium 72-iron-folic acid (PRENATAL PLUS, CALCIUM CARB,) 27 mg iron- 1 mg Tab Take 1 tablet by mouth daily.       diphenhydrAMINE (BENADRYL) 25 mg capsule Take 1 capsule (25 mg total) by mouth 4 (four) times a day as needed (nausea). 100 capsule 3     pyridoxine, vitamin B6, (B-6) 25 MG tablet Take 1 tablet (25 mg total) by mouth 4 (four) times a day as needed. 100 tablet 3     No current facility-administered medications for this  "visit.      No Known Allergies          High Risk Behavior: None    Review of Systems  General:  Denies problem  Eyes: Denies problem  Ears/Nose/Throat: Denies problem  Cardiovascular: Denies problem  Respiratory:  Denies problem  Gastrointestinal:  Denies problem, Genitourinary: Denies problem  Musculoskeletal:  Denies problem  Skin: Denies problem  Neurologic: Denies problem  Psychiatric: Denies problem  Endocrine: Denies problem  Heme/Lymphatic: Denies problem   Allergic/Immunologic: Denies problem       Objective:   Objective    Vitals:    11/21/17 1607   BP: 114/80   Pulse: 92   Resp: 20   Temp: 97.8  F (36.6  C)   TempSrc: Oral   SpO2: 99%   Weight: 165 lb (74.8 kg)   Height: 5' 3.75\" (1.619 m)     Physical Exam:  General Appearance: Alert, cooperative, no distress, appears stated age  Head: Normocephalic, without obvious abnormality, atraumatic  Eyes: PERRL, conjunctiva/corneas clear, EOM's intact  Ears: Normal TM's and external ear canals, both ears.  Poor oral hygeine.   Nose: Nares normal, septum midline,mucosa normal, no drainage  Throat: Lips, mucosa, and tongue normal; teeth and gums normal  Neck: Supple, symmetrical, trachea midline, no adenopathy;  thyroid: not enlarged, symmetric, no tenderness/mass/nodules; no carotid bruit or JVD  Back: Symmetric, no curvature, ROM normal, no CVA tenderness  Lungs: Clear to auscultation bilaterally, respirations unlabored  Breasts: No breast masses, tenderness, asymmetry, or nipple discharge.  Heart: Regular rate and rhythm, S1 and S2 normal, no murmur, rub, or gallop, Abdomen: Soft, non-tender, bowel sounds active all four quadrants,  no masses, no organomegaly  Pelvic:Normally developed genitalia with no external lesions or eruptions. Vagina and cervix show no lesions, inflammation, discharge or tenderness. No cystocele, No rectocele. Uterus midline.  No adnexal mass or tenderness.      Extremities: Extremities normal, atraumatic, no cyanosis or edema  Skin: Skin " color, texture, turgor normal, no rashes or lesions  Lymph nodes: Cervical, supraclavicular, and axillary nodes normal  Neurologic: Normal     Lab: Results for orders placed or performed in visit on 11/21/17   Urinalysis Macroscopic   Result Value Ref Range    Color, UA Orange (!) Colorless, Yellow, Straw, Light Yellow    Clarity, UA Slightly Cloudy (!) Clear    Glucose, UA Negative Negative    Bilirubin, UA Negative Negative    Ketones, UA Trace (!) Negative    Specific Gravity, UA 1.025 1.005 - 1.030    Blood, UA Trace (!) Negative    pH, UA 6.5 5.0 - 8.0    Protein, UA Trace (!) Negative mg/dL    Urobilinogen, UA 1.0 E.U./dL 0.2 E.U./dL, 1.0 E.U./dL    Nitrite, UA Positive (!) Negative    Leukocytes, UA Trace (!) Negative         Size Of Lesion In Cm: 1.1 Hemostasis: Drysol

## 2021-06-14 NOTE — PATIENT INSTRUCTIONS - HE
Plan:  1. Great work with consistent exercise. Aim for 25 grams of lean protein at 3 meals daily, every 5 hours. Hydrate well and if workouts are lasting longer than an hour, refuel with 12-15g of lean protein and some complex carbohydrates.    2. Phentermine can restart, refill sent to [facility]. Call if too expensive. Use HALF a tablet daily for 3 weeks then you could increase if tolerated to a full tablet if tolerated/needed. Reduce dose if too stimulated/anxious/or trouble sleeping.    3. Follow up with dietician, keep food journal the next 2 weeks and keep track of exercise duration/intensity to help break through this plateau in the higher 180s.   You could discuss meal replacement options/liquid diets if needed as well.    4. A1c was improving last time, continue metformin and we'll check A1c, vitamin D, B12 and comprehensive metabolic panel in the spring.    5. Recheck with me in 6-8 weeks.      To help lose weight in a safe way and sustainable way, I'd like to start you on a 1300 calorie diet each day.  Understanding that every 3500 calorie deficit adds up to a pound of weight reduction, with the combination of light aerobic exercise, some modest weight training and diet, we should be able to lose around 1 to 2.5lbs weekly depending on your starting height and weight and exercise routine with this goal intake. Dropping abut 1% total body weight weekly is exceptional weight loss and very sustainable once in a good routine.    Hunger and fatigue are the enemies of weight loss and behavior change in general.  We become much more reactive in our eating when we're hungry and tired and decrease our levels of self control.  It's not a personal failing, it's just body chemistry.   We can combat this by trying to avoid being tired and hungry at the same time.  To help this,  try to front load your calories in the first 10 hours of you day so you get into the fatigued evening hours reasonably full and you can control  impulses/mindless eating a lot better and avoid those bedtime snacks/evening treats that the tired brain craves.  If you can getting your exercise in the beginning of your day has also been shown to have superior results (but anytime is better than none).  We want to build up to 150 minutes or more as you progress through the first 2-3 months of weight loss season (300 minutes weekly is ideal for maintaining weight loss for most after the end of weight loss season).     Weight loss goes through ups and downs and plateaus but if you stay on the program, you will enjoy success.   Commit to the process, try to be on track at least 19 days out of 20 and continue to think about why you're doing this and what you're working towards. If you haven't thought of your reward for hitting your weight loss goals, think about it now. Using these little victory bribes along the way helps a lot.    Finding a diet that is satisfying and repeatable-- day in and day out, improves success.  The following uses the concept of Daily Caloric Restriction, eating less every day.  An outline of how to break up the day's food is given below.  It is a starting point and example of what a 1300 calorie diet looks like.  You can modify the listed foods to suite your particular tastes, but pay attention to portions and protein content.   Do not skip breakfast on this plan as it will leave you hungry and lead to overeating at some point during the rest of the day.  If you can make supper the last food for the day more days of the week then not, it will help a lot.  That means that the intake during those first 10-12 hours of the day and hitting your protein/intake targets for all three meals is vital to your success and evening hunger control.     Start reading labels so you know that you're getting what you think you are and start measuring foods so you can eventually look at a portion and understand how it will provide the fuel you  want.    Prepping raw veggies after you buy them (washing and putting into bags/tupperware for easy access), cooking several chicken breasts/proteins for the next 2-3 lunches and generally being able to grab and go what will keep you on target when time is short will greatly aid your success.  Prepare and plan ahead and success will follow. It really only requires a couple days weekly to optimize the access to the right food at the right time of day.  Food delivery and stopping at fast food is a sign of reactive eating and usually will signal a stalling of your weight loss.    Read labels:  for protein portions/yogurt, protein bars etc looking for items with more than 10 grams of protein and less than 10 grams of sugar is very helpful.  Frozen meals should have at least 18 grams of protein, under 10 grams of sugar as well (typically around 300-380 calories).    Please note: if you've had previous bariatric surgery: wait 20-30 minutes before/after eating to drink your beverages to avoid early fullness/dumping syndrome/worsening malabsorption, early loss of fullness and hunger.  Start with eating your protein first, slow down your meals and chew thoroughly.          1300 calorie diet:  Think Big Breakfast, Medium Lunch, smaller dinner.  Note: it's OK to consolidate the calories/protein of the mid morning snack with breakfast and the midafternoon snack with lunch if time doesn't allow or if your don't wish to have those snacks. No snacks recommended after supper. If you're prone to late afternoon nibbling, that is a great time to get your fitness in so you can get to supper without the extra.    Breakfast goal of 300 calories-350 calories (egg, 1/3 to 1/2 cup cooked old fashioned oatmeal or steel cut oats and berries,3-4oz greek yogurt.)Glass of water and if you like coffee (black) or tea.        Mid morning Snack or part of lunch, about 2-2.5 hrs later: 100 calories (cottage cheese/string cheese/ fruit or banana) and  a handful of cruchy/green veggies (cucumber/celery/green peppers/broccoli).  Small glass of water    Lunch:  300 calories (3.5-4 oz tuna with little mcconnell (no bread), apple, salad with drizzle of olive oil/balsamic vinegar for example)  Water    Snack:  100 calories.  4-5 oz Greek Yogurt with at least 17 grams of protein per serving.    Or Cottage cheese (lower sodium version preferable). Get this in about 2 hrs before you plan to have dinner. Protein drinks with at least 15-20 grams of protein and less than 6 grams of sugar could be used here to hit protein goals and decrease afternoon/evening hunger.  Glass of water    Dinner:  350 calories (4 oz meat or fish with cooked veggies or salad with minimal dressing, one piece of bread).  Glass of water or unsweetened tea with lemon  Dessert: 100 calories Medium Apple or Small handful of nuts, about 2/3 of an ounce (almonds/walnuts/cashews or pistachios are ideal).   Glass of water.    Try to avoid all soda and juice (low sodium V8 ok once a day).   You can do it! Embrace the healthier you and give up the inflammatory sugary treats that accelerate disease.    Choose an activity that is fun/interesting and available to you such as  Going for a swim for 15 minutes, walking 40 minutes, elliptical 20 minutes or cycling 20 minutes as many days a week as you can.  Having a walking or workout yadiel can make it even more enjoyable and keep you on track the days your motivation/energy may be lower.  If those times are too long for your fitness level, start at 10 minutes of movement and each week try to increase by 2 minutes each week.  The first 70 minutes a week (10 minutes a day only) of exercise drops the mortality rate of a sedentary person 30%!!!!  Our goal is to work up to 150 minutes or more per week of moderate to vigorous exercise  to optimize metabolism and prevent weight regain during maintenance. If time is short and your fitness allows it, high intensity interval  "training can be a nice way to cut the workout time in half:  warm up 2-4 minutes then 3-6 intervals of increased intensity effort (70% of max heart rate) followed by an equal amount or more of recovery before repeating, then 1-3 minutes of cooldown.     10 minutes of weight lifting can be helpful as well or using some body weight exercises like wall squats, pushups (with assistance as needed or standing/wall pushups), seat presses, yoga moves. At least twice weekly helps maintain a good strength to weight ratio, more days is better.  Dotour.com is a great resource for free video demonstrations.    If you are into strenuous weight lifting or prolonged exercise, use an online calculator for how many calories you've burned and if your exercise is lasting over 60 minutes, replace 20-30% of those calories burned immediately after exercise .  For the more limited exercise (less than 500 calories burned), there is no need to add extra food unless you notice a lot of hunger on your food journal.  Usually  Even a  calorie load after longer workouts is more than adequate.  For longer efforts, hunger will increase if you don't refuel afterwards and can get meal plan off track due to hunger, so replenish immediately after the workout to keep on the diet plan and feeling good.    Exercise example:  If you burned 1000 calories during the exercise, immediately (within 30 minutes) have a snack/replacement beverage totaling 200-300 calories and ideally have a 3:1 ratio of carbohydrate grams to protein grams to keep muscles ready for exercise the next day.  Have your next, full meal within 2-3 hours of exercise.    Tips for success:  KEEP A FOOD JOURNAL and a log of daily weights.  Pencil and paper works fine for most. Otherwise, Myfitnesspal, fitbit, Baby.com.br, Screwpulpit, garmin are all good tracker apps/programs or websites for food/fitness.  Remembering to ask yourself, \"How did my nourishment affect me today\" and comparing \"good " "days\" to \"hungry days\" to solidify what helps your body, in your life, feel it's best while losing weight.    1.  Prepare proteins ahead of time (broil chicken breasts in bulk so you can grab and go), steel cut oats can be stored in casserole dish/bowl in the fridge for quick scoop in the morning and rewarm in microwave, make use of crock pot recipes (watch salt content).    2.  Drink a 8-12 oz glass of water every 2-3 hours when awake.  We often mistake hunger for thirst, especially when losing weight.    3. Remember your Reward and Motivation when things get hard.    4.  Weigh yourself every morning and record, you'll stay on track better and learn how your body loses weight. Don't worry about 1 or 2 day patterns, but when on track you'll notice good trend downward of weight over 3-4 day segments.    5. Call or use [employer] messaging if problems/concerns .    6.  Find a handful of meals/foods that keep you on track and get into a boring routine that is sustainable for you.    7.  Take a complete multivitamin just to make sure all micronutrients are adequate during weight loss.    8. If losing hair/brittle nails it often means you are not taking enough protein.  Minimum goal is 60 grams daily of protein, most people with normal kidneys do well with upwards of 100-120 grams/day of protein. Consider taking Biotin as supplement or a \"Hair and Nail\" multivitamin.  If you are hypothyroid and losing hair, see you doctor for a check up of your levels if you haven't had one recently.    9.  Getting adequate sleep is very important for starting your day properly, when we are sleep deprived, our morning appetite is suppressed and without eating an adequate breakfast, we overeat later in the day when we're tired.  Our body heals in our sleep and our mental and immune health depends on this rest.  Aim for 7-8 hours of sleep nightly if possible.  If you sleep is disturbed, perform some introspection on " stressors/depression/anxiety/PTSD events or possible sleep disorders and we can trouble shoot solutions/evaulations if issues persist.    Exercise during the day, meditative breathing before bed and after waking and removing the television from the bedroom are easy ways to improve quality of sleep.      10. Relaxation.  Controlled breathing exercises can lower stress levels in the brain.  One technique is 4, 7, 8 breathing:  Place the tip of your tongue behind your front teeth, breath in through your mouth for 4 seconds, Hold it for 7 seconds and breath out slowly, making a leaky tire noise for 8 seconds.  Repeat 4 times.  Ideally do at the start and end of your day or if feeling stressed.  It works and it's why meditation/yoga/martial arts are often very breath based activities.  There are breathing techniques for alertness as well as relaxation out there and they can be quite helpful.        LEAN PROTEIN SOURCES  Getting 20-30 grams of protein, 3 meals daily, is appropriate for most people, some need more but more than about 40 grams per meal is not useful.  General rule is drinking one ounce of water per gram of protein eaten over the course of the day:  70 grams of protein each day, drink 70 oz of water.  Protein Source Portion Calories Grams of Protein                           Nonfat, plain Greek yogurt    (10 grams sugar or less) 3/4 cup (6 oz)  12-17   Light Yogurt (10 grams sugar or less) 3/4 cup (6 oz)  6-8   Protein Shake 1 shake 110-180 15-30   Skim/1% Milk or lactose-free milk 1 cup ( 8 oz)  8   Plain or light, flavored soymilk 1 cup  7-8   Plain or light, hemp milk 1 cup 110 6   Fat Free or 1% Cottage Cheese 1/2 cup 90 15   Part skim ricotta cheese 1/2 cup 100 14   Part skim or reduced fat cheese slices 1 ounce 65-80 8     Mozzarella String Cheese 1 80 8   Canned tuna, chicken, crab or salmon  (canned in water)  1/2 cup 100 15-20   White fish (broiled, grilled, baked) 3 ounces  100 21   [place]/Tuna (broiled, grilled, baked) 3 ounces 150-180 21   Shrimp, Scallops, Lobster, Crab 3 ounces 100 21   Pork loin, Pork Tenderloin 3 ounces 150 21   Boneless, skinless chicken /turkey breast                          (broiled, grilled, baked) 3 ounces 120 21   [place], [place], [place], and Venison 3 ounces 120 21   Lean cuts of red meat and pork (sirloin,   round, tenderloin, flank, ground 93%-96%) 3 ounces 170 21   Lean or Extra Lean Ground Turkey 1/2 cup 150 20   90-95% Lean [place] Burger 1 joe 140-180 21   Low-fat casserole with lean meat 3/4 cup 200 17   Luncheon Meats                                                        (turkey, lean ham, roast beef, chicken) 3 ounces 100 21   Egg (boiled, poached, scrambled) 1 Egg 60 7   Egg Substitute 1/2 cup 70 10   Nuts (limit to 1 serving per day)  3 Tbsp. 150 7   Nut [place] (peanut, almond)  Limit to 1 serving or less daily 1 Tbsp. 90 4   Soy Burger (varies) 1  15   Garbanzo, Black, [place] Beans 1/2 cup 110 7   Refried Beans 1/2 cup 100 7   Kidney and Lima beans 1/2 cup 110 7   Tempeh 3 oz 175 18   Vegan crumbles 1/2 cup 100 14   Tofu 1/2 cup 110 14   Chili (beans and extra lean beef or turkey) 1 cup 200 23   Lentil Stew/Soup 1 cup 150 12   Black Bean Soup 1 cup 175 12         Information about the Weight Loss Medication Phentermine    When combined with mindful eating and behavior changes, weight loss medications can be a nice additional tool to maximize your weight loss season.  There are no magic pills and without diet and behavior changes, weight loss will be minimal.  Think of this medication as a tool to make your diet and behavior changes easier and you'll enjoy a higher probability of success.  Remember not to skip meals, but use this medication to tolerate your reduced calories more easily.  If you are very hungry in the evenings, you are likely not eating enough in the first 10 hours of your day and need to focus on getting your protein  "requirements in at each of your 3 daily meals.      Phentermine is a stimulant medication related to the amphetamine class of medication but with a lower risk of dependence and addiction.  It is used for weight loss by suppressing the appetite region of the brain.  It also may speed up the metabolic rate and give a person more energy.  Like any medication there are potential side effects and the most common are:  Dry mouth occurs in almost everyone (hydrate well), fewer people experience Palpatations, fast heart rate, elevation of blood pressure, restlessness, insomnia, dizziness, change in mood, tremor, headache, changes in bowel movements,itchiness, changes in sex drive.  If you are or may have become pregnant, do not use phentermine as it increases the risk for birth defects/miscarriage.  Do not use if breastfeeding.    Some people can develop serious side effects which include:  Heart strain (\"ischemia\").  Tachycardia (fast heart rate or irregular heart rate).  Hypertension  Pulmonary Hypertension  Psychosis  Dependency and abuse has occurred in some.  If you've been on high dose (37.5mg) for long periods, phentermine should be tapered down over a few weeks before abruptly stopping as seizures have been reported rarely.    We do not recommend taking it in combination with the following medications due to potential drug interactions which can increase the risk of side effects and/or potential for seizures:    Absolutely contraindicated are:  Amphetamines or other stimulants like ADHD medication: (dextroamphetamine, amphetamine, diethylpropion, isocarboxazid, methamphetamine, lisdexamfetamine, benzphetamine,dexmethylphenidate, methylphenidate, selegiline patch, sibutramine, tranylcypromine.    Avoid use with:   Dopamine, dobutamine, ephedra, ephedrine, epinephrine, isoproterenol, linezolid, norepinephrine, phenylephrine injection, venlafaxine (Effexor).    Monitor or modify dose with:  Acebutolol, atenolol, " betaxolol, bisoprolol, carvedilol, droxidopa, esmolol, labetalol, magnesium citrate, metoprolol, nadolol, nebivolol, penbutolol, pindolol, propranolol, sotalol, timolol.    Caution with: armodafinil,betaxolol eye drops, brexpiprazole, bupropion, busulfan, caffeine, carteolol drops, enzalutaminde, ginseng, green tea, guarana, levobunolol drops, lindane cream, modafinil, afrin nasal spray (oxymetazoline), pamabrom, phenylephrine oral and nasal spray, pseudoephedrine (sudafed), rasgiline, sleegiline, bowel prep, tiagabine, timolol drops,  TRAMADOL due to increased risk of seizures.    The current cheapest place to fill your prescription is at [facility], [facility] or [place] [facility] or [facility] and is around $22for 90 tablets.  Occasionally, [employer] and [employer] have price matched, so call around and get the best price for you.  Other pharmacies may charge closer to$70- $100 for the same prescription. You don't have to be a member to use the pharmacy at [facility] currently.  An alternative some patients have tried is using a voucher system through [employer].  $25 paid on their website gets you a  voucher that can allows you to pick your meds up at [facility] without paying anything more.  [employer] may also offer discounted coupons and give prices around you.    Dosing:  We start with half a tablet for the first 2-3 weeks and if tolerating it without problems, you can take up to one full tablet daily in the morning after breakfast.  For those with evening hunger problems, sometimes half a tablet in the morning and half a tablet around 1 pm can be effective, however, risks of nighttime insomnia/restless increase with afternoon dosing so call me at the clinic if considering this regimen or having any issues.  You only have to use the amount effective for you, not to exceed one full tablet.  It can also be used situationaly and does not have to be taken every day. For more sensitive individuals,: get a pill  "cutter and cut the half tab into quarters and use a quarter of a tablet, about 9mg, for a couple weeks before increasing to half a tablet (18.75mg) if needed.  As always, if any questions give us a call at the [facility] telephone:  [phone].     Don't use Phentermine if sick/ill or using other stimulants/cold medications and it's OK to skip days that you don't feel the need for appetite suppressant assistance.  This medication works the day you take it and doesn't require \"building up\" in the system.    "

## 2021-06-14 NOTE — TELEPHONE ENCOUNTER
Yasmeen Ayala, , called to inquire updates on pt restrictions due to workers comp claim on hand.  Pt has not seen PCP since December, 2020.  Pt was to notify PCP if hand was better or not.  No notes seen, but letter written on 1/8/21 stating pt needs to wear hand brace at work permanently.  Faxed copy to Yasmeen melton with confirmation received. Thanks.

## 2021-06-14 NOTE — PROGRESS NOTES
Her nausea is slowly improving.    She completed all her abx for her uti.    Set up US at next visit.    Reviewed signs of miscarriage  Encouraged to eat regularly.

## 2021-06-15 NOTE — TELEPHONE ENCOUNTER
RN cannot approve Refill Request    RN can NOT refill this medication Protocol failed and NO refill given. Last office visit: 12/7/2020 Susan Bejarano MD Last Physical: Visit date not found Last MTM visit: Visit date not found Last visit same specialty: 2/23/2021 Jerry Copeland MD.  Next visit within 3 mo: Visit date not found  Next physical within 3 mo: Visit date not found      Diaz Sharma, [facility] Triage/Med Refill 3/15/2021    Requested Prescriptions   Pending Prescriptions Disp Refills     metFORMIN (GLUCOPHAGE) 500 MG tablet [Pharmacy Med Name: METFORMIN 500MG TABLETS] 180 tablet 3     Sig: TAKE 1 TABLET(500 MG) BY MOUTH TWICE DAILY WITH MEALS       Metformin Refill Protocol Failed - 3/15/2021  3:19 AM        Failed - A1C in last 6 months     Hemoglobin A1c   Date Value Ref Range Status   06/25/2020 5.8 3.5 - 6.0 % Final               Failed - Microalbumin in last year      No results found for: MICROALBUR               Passed - Blood pressure in last 12 months     BP Readings from Last 1 Encounters:   02/23/21 110/70             Passed - LFT or AST or ALT in last 12 months     Albumin   Date Value Ref Range Status   02/23/2021 4.1 3.5 - 5.0 g/dL Final     Bilirubin, Total   Date Value Ref Range Status   02/23/2021 0.5 0.0 - 1.0 mg/dL Final     Alkaline Phosphatase   Date Value Ref Range Status   02/23/2021 115 45 - 120 U/L Final     AST   Date Value Ref Range Status   02/23/2021 20 0 - 40 U/L Final     ALT   Date Value Ref Range Status   02/23/2021 19 0 - 45 U/L Final     Protein, Total   Date Value Ref Range Status   02/23/2021 7.2 6.0 - 8.0 g/dL Final                Passed - GFR or Serum Creatinine in last 6 months     GFR MDRD Non Af Amer   Date Value Ref Range Status   02/23/2021 >60 >60 mL/min/1.73m2 Final     GFR MDRD Af Amer   Date Value Ref Range Status   02/23/2021 >60 >60 mL/min/1.73m2 Final             Passed - Visit with PCP or prescribing provider visit in last 6 months or next 3  months     Last office visit with prescriber/PCP: 12/7/2020 OR same dept: 2/23/2021 Jerry Copeland MD OR same specialty: 2/23/2021 Jerry Copeland MD Last physical: Visit date not found Last MTM visit: Visit date not found         Next appt within 3 mo: Visit date not found  Next physical within 3 mo: Visit date not found  Prescriber OR PCP: Susan Bejarano MD  Last diagnosis associated with med order: 1. Prediabetes  - metFORMIN (GLUCOPHAGE) 500 MG tablet [Pharmacy Med Name: METFORMIN 500MG TABLETS]; TAKE 1 TABLET(500 MG) BY MOUTH TWICE DAILY WITH MEALS  Dispense: 180 tablet; Refill: 3     If protocol passes may refill for 12 months if within 3 months of last provider visit (or a total of 15 months).

## 2021-06-15 NOTE — PROGRESS NOTES
"Vernell Christensen is a 25 y.o. female who is being evaluated via a billable telephone visit.      The patient has been notified of following:     \"This telephone visit will be conducted via a call between you and your physician/provider. We have found that certain health care needs can be provided without the need for a physical exam.  This service lets us provide the care you need with a short phone conversation.  If a prescription is necessary we can send it directly to your pharmacy.  If lab work is needed we can place an order for that and you can then stop by our lab to have the test done at a later time.    Telephone visits are billed at different rates depending on your insurance coverage. During this emergency period, for some insurers they may be billed the same as an in-person visit.  Please reach out to your insurance provider with any questions.    If during the course of the call the physician/provider feels a telephone visit is not appropriate, you will not be charged for this service.\"    Patient has given verbal consent to a Telephone visit? Yes    What phone number would you like to be contacted at? [phone]    Patient would like to receive their AVS by AVS Preference: Natan.    Additional provider notes:     used for NS recheck of weight loss: 7/22/20 IW of 200 lbs, BMI 35.4. down to 187 lbs in January, restarted phentermine in January visit after andrei in September and reports today: 182 lbs now, 5 lbs down. Phentermine helpful again. Getting meals in well, even if not \"too hungry\".   Prediabetes treated w/ weight loss goal and metformin. Plan to recheck labs in spring.  New job as a  and walks all day now has been helping but no other exercise. During work hours, 10-14k steps over 8 hour shift. Hydrating well and taking in water regularly, goal of 2L daily.    Height: 5' 3\" (1.6 m) (2/11/2021 10:00 AM)  Initial Weight: 200 lbs (2/11/2021 10:00 AM)  Weight: 182 lb (82.6 " kg) (2/11/2021 10:00 AM)  Weight loss from initial: 18 (2/11/2021 10:00 AM)  % Weight loss: 9 % (2/11/2021 10:00 AM)  BMI (Calculated): 32.2 (2/11/2021 10:00 AM)  SpO2: 98 % (11/21/2020 12:42 PM)    Plan:  1. Great work, continue mindful meals/eating and looking to get about 60-70g of lean protein daily. Hydrate well [id number] of water daily (about 2 liters).    2. Phentermine refilled. Stay on half a tablet AFTER breakfast.  Will likely continue medication 3-6 months more and then taper of during weight stabilization period.    3. Follow up as planned with dietician.    4. The new job is excellent for your fitness/steps ands strength. If job change, finding those 200minutes/week of moderate aerobic activity and 2-3 days of strength work will need to replace what you're doing daily on your job now.    Continue metformin, vitamin D and we'll recheck blood work likely after our next visit this spring.  Goal of getting weight under 175lbs/BMI under 30 and stablizing that long term.  Phone call duration: 20 minutes    Isaak Grace CMA

## 2021-06-15 NOTE — PROGRESS NOTES
No lof.  No bleeding.  She is eating well.    Her nausea is improving.    US ordered.    She is drinking a lot of gatorade.    Will give paper today to get to the hospital.    Reviewed signs of pre eclampsia  US ordered.

## 2021-06-15 NOTE — TELEPHONE ENCOUNTER
Called patient with the help of a . No answer, left message for patient to call the clinic to reschedule (phone number provided).

## 2021-06-15 NOTE — PROGRESS NOTES
ASSESMENT AND PLAN:  Diagnoses and all orders for this visit:    Loss of hair  -     Thyroid Cascade  -     Comprehensive Metabolic Panel  Will consider Derm referral.    Encounter for IUD removal  -     acetaminophen (TYLENOL) 500 MG tablet; Take 1-2 tablets (500-1,000 mg total) by mouth every 4 (four) hours as needed.  Dispense: 100 tablet; Refill: 0  Requested tylenol to use as needed.  IUD successfully removed.  Patient tolerated the procedure well.    Menorrhagia with regular cycle  -     Thyroid Toxey  -     Comprehensive Metabolic Panel  -     HM2(CBC w/o Differential)    Immunization due  -     HPV vaccine 9 valent 3 dose IM      This transcription uses voice recognition software, which may contain typographical errors.      SUBJECTIVE: Vernell Christensen is a 25-year-old female here with a complaint of hair loss and IUD removal.  Hair loss started 2 months ago.  No bald spot but significant amount of hair loss even with gentle brush.  No known chronic medical condition.  She is under significant amount of stress currently, in the process of getting .  She has 3 children ages 8, 7 and 2.   She also wants her IUD removed.  It was placed 2 years ago.  She is complaining of heavy menstrual bleeding every month lasting 6 to 7 days.  She is not sexually active currently.  No abnormal vaginal discharge or irritation.  No known exposure to COVID-19.  No COVID-19 symptoms.    Past Medical History:   Diagnosis Date     Diabetes mellitus (H)      Patient Active Problem List   Diagnosis     Cholestasis during pregnancy     IUD (intrauterine device) in place     Class 2 obesity     Drug-induced weight gain     Insulin resistance     Symptomatic cholelithiasis       Allergies:  No Known Allergies    Social History     Tobacco Use   Smoking Status Never Smoker   Smokeless Tobacco Never Used       Review of systems otherwise negative except as listed in HPI.   Social History     Tobacco Use   Smoking Status Never  "Smoker   Smokeless Tobacco Never Used       OBJECTICE: /70 (Patient Site: Left Arm, Patient Position: Sitting, Cuff Size: Adult Regular)   Pulse 88   Temp 98.2  F (36.8  C) (Oral)   Resp 16   Ht 5' 3\" (1.6 m)   Wt 180 lb 6 oz (81.8 kg)   LMP 02/06/2021 (Exact Date)   SpO2 97%   BMI 31.95 kg/m      DATA REVIEWED:    Labs Reviewed or Ordered (1):       GEN-alert,  in no apparent distress.  HEAD-no bald spot noted.  No significant abnormalities on the scalp.  CV-regular rate and rhythm with no murmur.   RESP-lungs clear to auscultation .  ABDOMEN- Soft , not tender.  PELVIC-normal external genitalia.  Cervix appears normal.  IUD string visualized.  IUD successfully removed.  Patient tolerated procedure well.  SKIN-normal        Jerry Copeland   2/23/2021   "

## 2021-06-15 NOTE — PATIENT INSTRUCTIONS - HE
Plan:  1. Great work, continue mindful meals/eating and looking to get about 60-70g of lean protein daily. Hydrate well [id number] of water daily (about 2 liters).    2. Phentermine refilled. Stay on half a tablet AFTER breakfast.  Will likely continue medication 3-6 months more and then taper of during weight stabilization period.    3. Follow up as planned with dietician.    4. The new job is excellent for your fitness/steps ands strength. If job change, finding those 200minutes/week of moderate aerobic activity and 2-3 days of strength work will need to replace what you're doing daily on your job now.    Continue metformin, vitamin D and we'll recheck blood work likely after our next visit this spring.  Goal of getting weight under 175lbs/BMI under 30 and stablizing that long term.

## 2021-06-16 PROBLEM — O26.649 CHOLESTASIS DURING PREGNANCY: Status: ACTIVE | Noted: 2018-06-08

## 2021-06-16 PROBLEM — T50.905A DRUG-INDUCED WEIGHT GAIN: Status: ACTIVE | Noted: 2020-07-22

## 2021-06-16 PROBLEM — E88.819 INSULIN RESISTANCE: Status: ACTIVE | Noted: 2020-07-22

## 2021-06-16 PROBLEM — E66.812 CLASS 2 OBESITY: Status: ACTIVE | Noted: 2020-06-22

## 2021-06-16 PROBLEM — R63.5 DRUG-INDUCED WEIGHT GAIN: Status: ACTIVE | Noted: 2020-07-22

## 2021-06-16 PROBLEM — K80.20 SYMPTOMATIC CHOLELITHIASIS: Status: ACTIVE | Noted: 2020-09-04

## 2021-06-16 NOTE — PROGRESS NOTES
She complains of some painful contractions.  They happened at night.  This happened 2 days ago.  This passed after 1-2 hours.  No lof . No bleeding.    No urinary problems.  No other problems.  She was drinking water.    She is otherwise doing well.  No unusual vaginal discharge.    Advised to go to the hospital if her contractions recur.    Reviewed pain meds in labor.    Needs 1 hour glucose, hgb, syphilis, uai at next visit.  Obtain baseline hellp labs at next visit.

## 2021-06-16 NOTE — PATIENT INSTRUCTIONS - HE
Plan:  1. Continue excellent routine, you're now down about 12.5% total body weight since last July (25 lbs)  2. Continue phentermine and metformin as you are using. Labs from February looked good with improved blood sugar and we'll recheck your vitmain D and A1c test this spring whenever you like (order placed today).  3. Recheck with me at the end of spring/early summer and we'll continue using medication support until you've reached a stable weight and than slowly transition off phentermine and eventually off the metformin likely this winter

## 2021-06-16 NOTE — PROGRESS NOTES
"Vernell Christensen is a 25 y.o. female who is being evaluated via a billable telephone visit.      The patient has been notified of following:     \"This telephone visit will be conducted via a call between you and your physician/provider. We have found that certain health care needs can be provided without the need for a physical exam.  This service lets us provide the care you need with a short phone conversation.  If a prescription is necessary we can send it directly to your pharmacy.  If lab work is needed we can place an order for that and you can then stop by our lab to have the test done at a later time.    Telephone visits are billed at different rates depending on your insurance coverage. During this emergency period, for some insurers they may be billed the same as an in-person visit.  Please reach out to your insurance provider with any questions.    If during the course of the call the physician/provider feels a telephone visit is not appropriate, you will not be charged for this service.\"    Patient has given verbal consent to a Telephone visit? Yes    What phone number would you like to be contacted at? [phone]    Patient would like to receive their AVS by AVS Preference: Natan.    Additional provider notes: NS follow up. 10:24 AM   used ([place]). A week ago was weighing 175 lbs, down another 7 lbs since our last visit and now down 25 lbs from her starting weight last July.   Phentermine and metformin use is good, well tolerated without ill effects. Vitamin D use is regular/good from her deficiency issues last summer when she was at level of 7.6 June 2020..   She finds that her diet and medication are working well together and working as a  now with increased activity/business.    Reviewed clinic labs from February showing normal CBC, CMP and TSH levels.   Height: 5' 3\" (1.6 m) (2/23/2021  7:02 AM)  Initial Weight: 200 lbs (3/29/2021 10:00 AM)  Weight: 175 lb (79.4 kg) (3/29/2021 " 10:00 AM)  Weight loss from initial: 25 (3/29/2021 10:00 AM)  % Weight loss: 12.5 % (3/29/2021 10:00 AM)  BMI (Calculated): 32 (2/23/2021  7:02 AM)  SpO2: 97 % (2/23/2021  7:02 AM)    Plan:  1. Continue excellent routine, you're now down about 12.5% total body weight since last July (25 lbs)  2. Continue phentermine and metformin as you are using. Labs from February looked good with improved blood sugar and we'll recheck your vitmain D and A1c test this spring whenever you like (order placed today).  3. Recheck with me at the end of spring/early summer and we'll continue using medication support until you've reached a stable weight and than slowly transition off phentermine and eventually off the metformin likely this winter.    Phone call duration: 20 minutes    Mason Mayfield MD

## 2021-06-16 NOTE — PROGRESS NOTES
She is doing well.  US reviewed today.    She says she has already received her influenza shot.    History of precipitous delivery  Will review pain meds at next visit.

## 2021-06-17 NOTE — PROGRESS NOTES
Assessment/Plan:   Nausea  Throat pain  Nasal congestion  Fatigue, unspecified type  Persistent nausea/vomiting, fatigue for a couple days. Has not taken anymore zofran since the ER. Likely a viral illness. We rechecked the CBC, CMP due to recent high WBC (17,000) and low potassium and these were now normal. Ondansetron given in the office helped her nausea.  Will check Covid. RST negative for strep. Continue bland diet in small amounts as tolerated and fluids to rehydrate. No work today and pending covid result. She should  her prescription for ondansetron waiting at the pharmacy. Recheck as needed. Follow up vaginal spotting if persistent with primary care. Return if abdominal pain.   - Comprehensive Metabolic Panel  - ondansetron disintegrating tablet 4 mg (ZOFRAN-ODT)  - Rapid Strep A Screen-Throat  - Symptomatic COVID-19 Virus (CORONAVIRUS) PCR  - Group A Strep PCR Throat Swab  - HM1(CBC and Differential)    Rapid strep test is negative. The hemoglobin and white blood cells are fine. Other labs are still pending. I will call if abnormal and a new plan is needed.     You have ondansetron waiting at the [facility] pharmacy on [facility] in [facility].   Use this as needed for nausea every 8 hours or so.     Leslie soft diet as tolerated, small amounts at a time    Drink water and some electrolyte drinks in small amounts at a time to avoid stretching the stomach, but frequently.     Rest. No work today or tomorrow. May return to work Sunday if covid test negative and no further vomiting for 24 hours. Note written.     Recheck if worse or no better.     Covid test is pending. Should be back by end of day tomorrow.       Subjective:      Vernell Christensen is a 25 y.o. female who presents with vomiting and weakness. She developed vomiting on Wednesday afternoon. She went to the ER that night due to persistent vomiting. They gave her zofran and fluids. UPT negative, UA negative. Elevated WBC thought to be due to margination,  low potassium. She was sent home with prescription for zofran which she hasn't picked up.   There has been no diarrhea and no abdominal pain. She has felt better since going home from the ER but is still having nausea. Not eating well due to loss of appetite. Went back to work today but felt weak and tired. She works as a  and was very much slower than usual and felt dizzy. She then had emesis x 1 again. Her supervisor sent her home. She noted some slight vaginal spotting yesterday and today, no other vaginal sxs. This is midcycle for her. No abdominal pain.   She is having some nasal congestion. She has had a ST since vomiting this morning. She has a headache and mild myalgia.  No significant cough no shortness of breath no rash. No abdominal pain, no sinus pain or pressure.   She is planning to go to [place] for a 2 week visit leaving next Wednesday.  No known ill contacts except her child who had emesis Tuesday evening/overnight and then was fine. She first became ill later on Wednesday.   ER records reviewed.     No Known Allergies     Current Outpatient Medications on File Prior to Visit   Medication Sig Dispense Refill     acetaminophen (TYLENOL) 500 MG tablet Take 1-2 tablets (500-1,000 mg total) by mouth every 4 (four) hours as needed. 100 tablet 0     cholecalciferol, vitamin D3, 125 mcg (5,000 unit) capsule Take 1 capsule (5,000 Units total) by mouth daily. 90 capsule 3     ibuprofen (ADVIL,MOTRIN) 600 MG tablet TAKE 1 TABLET(600 MG) BY MOUTH EVERY 6 HOURS AS NEEDED FOR PAIN 30 tablet 0     metFORMIN (GLUCOPHAGE) 500 MG tablet TAKE 1 TABLET(500 MG) BY MOUTH TWICE DAILY WITH MEALS 180 tablet 3     ondansetron (ZOFRAN ODT) 4 MG disintegrating tablet Take 1 tablet (4 mg total) by mouth every 8 (eight) hours as needed for nausea (allow to dissolve under tongue). 10 tablet 0     phentermine (ADIPEX-P) 37.5 mg tablet Half a tablet after after breakfast. 60 tablet 0     No current  facility-administered medications on file prior to visit.      Patient Active Problem List   Diagnosis     Cholestasis during pregnancy     IUD (intrauterine device) in place     Class 2 obesity     Drug-induced weight gain     Insulin resistance     Symptomatic cholelithiasis       Objective:     /78   Pulse 75   Temp 98.6  F (37  C)   Resp 16   Wt 178 lb (80.7 kg)   LMP 04/29/2021   SpO2 99%   Breastfeeding No   BMI 30.76 kg/m      Physical  General Appearance: Alert, cooperative, no distress, low energy, AVSS  Head: Normocephalic, without obvious abnormality, atraumatic  Eyes: Conjunctivae are normal.   Ears: Normal TMs and external ear canals, both ears  Nose: No significant congestion.  Throat: Throat is red posteriorly.  No exudate.  No significant lesions  Neck: No adenopathy  Lungs: Clear to auscultation bilaterally, respirations unlabored  Heart: Regular rate and rhythm  Abdomen: Soft, non-tender  Extremities: No lower extremity edema  Skin: Skin color, texture, turgor normal, no rashes or lesions  Psychiatric: Patient has a normal mood and affect.        Recent Results (from the past 24 hour(s))   Rapid Strep A Screen-Throat    Specimen: Throat   Result Value Ref Range    Rapid Strep A Antigen No Group A Strep detected, presumptive negative No Group A Strep detected, presumptive negative   Comprehensive Metabolic Panel   Result Value Ref Range    Sodium 137 136 - 145 mmol/L    Potassium 3.5 3.5 - 5.0 mmol/L    Chloride 104 98 - 107 mmol/L    CO2 25 22 - 31 mmol/L    Anion Gap, Calculation 8 5 - 18 mmol/L    Glucose 83 70 - 125 mg/dL    BUN 11 8 - 22 mg/dL    Creatinine 0.61 0.60 - 1.10 mg/dL    GFR MDRD Af Amer >60 >60 mL/min/1.73m2    GFR MDRD Non Af Amer >60 >60 mL/min/1.73m2    Bilirubin, Total 0.4 0.0 - 1.0 mg/dL    Calcium 9.1 8.5 - 10.5 mg/dL    Protein, Total 7.3 6.0 - 8.0 g/dL    Albumin 4.0 3.5 - 5.0 g/dL    Alkaline Phosphatase 110 45 - 120 U/L    AST 19 0 - 40 U/L    ALT 24 0 - 45  U/L   HM1 (CBC with Diff)   Result Value Ref Range    WBC 8.7 4.0 - 11.0 thou/uL    RBC 4.58 3.80 - 5.40 mill/uL    Hemoglobin 11.9 (L) 12.0 - 16.0 g/dL    Hematocrit 37.4 35.0 - 47.0 %    MCV 82 80 - 100 fL    MCH 26.0 (L) 27.0 - 34.0 pg    MCHC 31.8 (L) 32.0 - 36.0 g/dL    RDW 13.2 11.0 - 14.5 %    Platelets 276 140 - 440 thou/uL    MPV 11.7 (H) 7.0 - 10.0 fL    Neutrophils % 67 50 - 70 %    Lymphocytes % 24 20 - 40 %    Monocytes % 6 2 - 10 %    Eosinophils % 2 0 - 6 %    Basophils % 1 0 - 2 %    Immature Granulocyte % 0 <=0 %    Neutrophils Absolute 5.8 2.0 - 7.7 thou/uL    Lymphocytes Absolute 2.1 0.8 - 4.4 thou/uL    Monocytes Absolute 0.6 0.0 - 0.9 thou/uL    Eosinophils Absolute 0.1 0.0 - 0.4 thou/uL    Basophils Absolute 0.1 0.0 - 0.2 thou/uL    Immature Granulocyte Absolute 0.0 <=0.0 thou/uL   Group A Strep PCR Throat Swab    Specimen: Throat   Result Value Ref Range    Group Strep A by PCR No Group A Strep detected No Group A Strep detected, Invalid, ERROR

## 2021-06-17 NOTE — PROGRESS NOTES
She has some increased discharge, but only when the baby moves.    No bleeding.  No regular leaking of fluid.    No headaches or swelling.    She has an appt with social work to help get a carseat  Reviewed that if she starts soaking through her underwear regularly, then she would need to be evaluated for rupture of membranes.   1 hour testing done today.  Baseline hellp labs done otday  tdap done today.

## 2021-06-17 NOTE — PROGRESS NOTES
Reviewed diet changes.    Encouraged daily walking.   History of precipitous delivery  Check position with bedside US at next visit.

## 2021-06-17 NOTE — PATIENT INSTRUCTIONS - HE
"Rapid strep test is negative. The hemoglobin and white blood cells are fine. Other labs are still pending. I will call if abnormal and a new plan is needed.     You have ondansetron waiting at the [facility] pharmacy on [facility] [place] in [facility].   Use this as needed for nausea every 8 hours or so.     [place] soft diet as tolerated, small amounts at a time    Drink water and some electrolyte drinks in small amounts at a time to avoid stretching the stomach, but frequently.     Rest. No work today or tomorrow. May return to work Sunday if covid test negative and no further vomiting for 24 hours. Note written.     Recheck if worse or no better.     Covid test is pending. Should be back by end of day tomorrow.     Discharge Instructions for COVID-19 Patients  You have--or may have--COVID-19. Please follow the instructions listed below.   If you have a weakened immune system, discuss with your doctor any other actions you need to take.  How can I protect others?  If you have symptoms (fever, cough, body aches or trouble breathing):    Stay home and away from others (self-isolate) until:  ? Your other symptoms have resolved (gotten better). And   ? You've had no fever--and no medicine that reduces fever--for 1 full day (24 hours). And   ? At least 10 days have passed since your symptoms started. (You may need to wait 20 days. Follow the advice of your care team.)  If you don't show symptoms, but testing showed that you have COVID-19:    Stay home and away from others (self-isolate) until at least 10 days have passed since the date of your first positive COVID-19 test.  During this time    Stay in your own room, even for meals. Use your own bathroom if you can.    Stay away from others in your home. No hugging, kissing or shaking hands. No visitors.    Don't go to work, school or anywhere else.    Clean \"high touch\" surfaces often (doorknobs, counters, handles). Use household cleaning spray or wipes.    You'll find a full list of "  on the EPA website: www.epa.gov/pesticide-registration/list-n-disinfectants-use-against-sars-cov-2.    Cover your mouth and nose with a mask or other face covering to avoid spreading germs.    Wash your hands and face often. Use soap and water.    Caregivers in these groups are at risk for severe illness due to COVID-19:  ? People 65 years and older  ? People who live in a nursing home or long-term care facility  ? People with chronic disease (lung, heart, cancer, diabetes, kidney, liver, immunologic)  ? People who have a weakened immune system, including those who:    Are in cancer treatment    Take medicine that weakens the immune system, such as corticosteroids    Had a bone marrow or organ transplant    Have an immune deficiency    Have poorly controlled HIV or AIDS    Are obese (body mass index of 40 or higher)    Smoke regularly    Caregivers should wear gloves while washing dishes, handling laundry and cleaning bedrooms and bathrooms.    Use caution when washing and drying laundry: Don't shake dirty laundry and use the warmest water setting that you can.    For more tips on managing your health at home, go to www.cdc.gov/coronavirus/2019-ncov/downloads/10Things.pdf.  How can I take care of myself at home?  1. Get lots of rest. Drink extra fluids (unless a doctor has told you not to).  2. Take Tylenol (acetaminophen) for fever or pain. If you have liver or kidney problems, ask your family doctor if it's okay to take Tylenol.   Adults can take either:   ? 650 mg (two 325 mg pills) every 4 to 6 hours, or   ? 1,000 mg (two 500 mg pills) every 8 hours as needed.  ? Note: Don't take more than 3,000 mg in one day. Acetaminophen is found in many medicines (both prescribed and over-the-counter medicines). Read all labels to be sure you don't take too much.   For children, check the Tylenol bottle for the right dose. The dose is based on the child's age or weight.  3. If you have other health problems (like  cancer, heart failure, an organ transplant or severe kidney disease): Call your specialty clinic if you don't feel better in the next 2 days.  4. Know when to call 911. Emergency warning signs include:  ? Trouble breathing or shortness of breath  ? Pain or pressure in the chest that doesn't go away  ? Feeling confused like you haven't felt before, or not being able to wake up  ? Bluish-colored lips or face  5. Your doctor may have prescribed a blood thinner medicine. Follow their instructions.  Where can I get more information?    [facility] - About COVID-19:   [email or web address]    CDC - What to Do If You're Sick: www.cdc.gov/coronavirus/2019-ncov/about/steps-when-sick.html    [facility] - Ending Home Isolation: www.cdc.gov/coronavirus/2019-ncov/hcp/disposition-in-home-patients.html    [facility] - Caring for Someone: www.cdc.gov/coronavirus/2019-ncov/if-you-are-sick/care-for-someone.html    [facility] - Interim Guidance for Hospital Discharge to Home: www.[email or web address]./diseases/coronavirus/hcp/hospdischarge.pdf    Below are the COVID-19 hotlines at the [facility] ([facility]). Interpreters are available.  ? For health questions: Call 004-345-7831 or 1-546.426.8137 (7 a.m. to 7 p.m.)  ? For questions about schools and childcare: Call 071-673-7744 or 1-324.454.1140 (7 a.m. to 7 p.m.)    For informational purposes only. Not to replace the advice of your health care provider. Clinically reviewed by Dr. Justin Gibson.   Copyright   2020 [employer]. All rights reserved. [employer] [id number] - REV 01/05/21.

## 2021-06-17 NOTE — TELEPHONE ENCOUNTER
Telephone Encounter by Autumn Allen at 8/5/2020  8:04 AM     Author: Autumn Allen Service: -- Author Type: --    Filed: 8/5/2020  8:06 AM Encounter Date: 7/31/2020 Status: Signed    : Autumn Allen       PRIOR AUTHORIZATION DENIED    Denial Rational: Weight loss medications are excluded from coverage          Appeal Information: Exclusions can not be appealed

## 2021-06-17 NOTE — PROGRESS NOTES
OUTPATIENT VISIT NOTE                                                   Date of Visit: 5/19/2021     Chief Complaint   Chief Complaint   Patient presents with     Menstrual Problem         History of Present Illness   Vernell Christensen is a 25 y.o. female has had two periods in the last 30 days.  3/12/2021  4/11/2021  4/29/2021--3 days--normal period  5/15/2021--1 day--light.  No cramping.  No contraception.  No abnormal vaginal discharge,  Would like to get pregnant.    Went to [place] 5/5/2021  REturned on 5/15/2021         MEDICATIONS   Current Outpatient Medications on File Prior to Visit   Medication Sig Dispense Refill     acetaminophen (TYLENOL) 500 MG tablet Take 1-2 tablets (500-1,000 mg total) by mouth every 4 (four) hours as needed. 100 tablet 0     cholecalciferol, vitamin D3, 1,250 mcg (50,000 unit) capsule Take once weekly for 12 weeks to boost low levels. 12 capsule 0     cholecalciferol, vitamin D3, 125 mcg (5,000 unit) capsule Take 1 capsule (5,000 Units total) by mouth daily. 90 capsule 3     ibuprofen (ADVIL,MOTRIN) 600 MG tablet TAKE 1 TABLET(600 MG) BY MOUTH EVERY 6 HOURS AS NEEDED FOR PAIN 30 tablet 0     metFORMIN (GLUCOPHAGE) 500 MG tablet TAKE 1 TABLET(500 MG) BY MOUTH TWICE DAILY WITH MEALS 180 tablet 3     ondansetron (ZOFRAN ODT) 4 MG disintegrating tablet Take 1 tablet (4 mg total) by mouth every 8 (eight) hours as needed for nausea (allow to dissolve under tongue). 10 tablet 0     phentermine (ADIPEX-P) 37.5 mg tablet Half a tablet after after breakfast. 60 tablet 0     No current facility-administered medications on file prior to visit.          SOCIAL HISTORY   Social History     Tobacco Use     Smoking status: Never Smoker     Smokeless tobacco: Never Used   Substance Use Topics     Alcohol use: No     Frequency: Never           Physical Exam   Vitals:    05/19/21 1131   BP: 110/70   Pulse: 92   Resp: 14   Temp: 99.1  F (37.3  C)   TempSrc: Oral   SpO2: 99%   Weight: 175 lb 4.8 oz (79.5  "kg)   Height: 5' 4.06\" (1.627 m)        GEN:  NAD  LUNGS:  Clear to auscultation without wheezing.  Normal effort.  HEART:  RRR without murmur, rub or gallop   ABD;  Soft, nontender       Diagnostic Studies   LABS:  Results for orders placed or performed in visit on 05/19/21   Pregnancy, Urine   Result Value Ref Range    Pregnancy Test, Urine Negative Negative            Assessment and Plan   1. Abnormal menstruation  Pregnancy, Urine         Discussed can have irregularity to period due to stressors or other problems.  If no period in 4-6 weeks, recheck pregnancy test.      Discussed signs / symptoms that warrant urgent / emergent medical attention.     Recheck if worsening or not improving.       Jeffrey Cavanaugh MD        Pertinent History     The following portions of the patient's history were reviewed and updated as appropriate: allergies, current medications, past family history, past medical history, past social history, past surgical history and problem list.         "

## 2021-06-17 NOTE — PROGRESS NOTES
Doing well.    Over the last 2 weeks she felt a lot of movement from the baby and since that time she has had increased pressure in the vaginal area.  This is particularly intense when she is walking a lot.  No painful contractions.  No bleeding, cramping, leaking of water.  Baby is vertex on US.    Reviewed when to go to the hospital  Reviewed signs of pre eclampsia and signs of labor.    gbs at next visit  Stop ASA at next visit

## 2021-06-17 NOTE — TELEPHONE ENCOUNTER
Called patient for her phone nutrition follow up. No answer -  left message for patient to call the clinic to reschedule (phone number provided).    Claribel Escamilla RD, LD

## 2021-06-17 NOTE — PROGRESS NOTES
Vernell came in to see CYNDI. We completed the car seat referral, and scheduled for her to come back for the crib and baby bundle closer to delivery. She is interested in a PHN, SW will refer to [facility]. She is also interested in a breast pump and CYNDI will let her drMayitoknow to give order so it can be ordered as well.

## 2021-06-18 NOTE — PROGRESS NOTES
She has developed swelling over the past week.    They are improved with using some cold water over them.    No headaches.  No vision changes.  No ruq pain.    GBS negative result reviewed.    Reviewed when to go to the hospital.    Reviewed signs of pre eclampisa and hellp.  No current ruq pain.    Baby has dropped more.   She has a lot of contractions, but no painful ones.  No lof.  No bleeding.  She feels a lot of pelvic pressure.
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
## 2021-06-18 NOTE — PATIENT INSTRUCTIONS - HE
Patient Instructions by Susan Bejarano MD at 3/10/2020  2:30 PM     Author: Susan Bejarano MD Service: -- Author Type: Physician    Filed: 3/10/2020  3:03 PM Encounter Date: 3/10/2020 Status: Signed    : Susan Bejarano MD (Physician)       Patient Education      Qué es el síndrome del ovario poliquístico?  BIG: El síndrome del ovario poliquístico (PCOS, por shari siglas en inglés) causa la aparición de pequeños quistes inofensivos en los ovarios, además de otros síntomas. Ginna síndrome es causado por el desequilibrio de ciertas hormonas. La palabra síndrome significa deborah de síntomas. Las mujeres que tienen el síndrome del ovario poliquístico no tienen períodos, o shari períodos son irregulares o richardson mucho tiempo.   Shari ovarios  Los ovarios de la db contienen los óvulos. Cada óvulo está en el interior de zeina cápsula llamada folículo. Normalmente, hebert los años reproductivos, cada mes se desarrolla un folículo que produce un óvulo amy. Ginna óvulo es liberado (ovulación) y el folículo se disuelve.  Desequilibrio hormonal  En el síndrome del ovario poliquístico, las hormonas o sustancias químicas que controlan la ovulación están desequilibradas. Entre estas hormonas se encuentran el estrógeno, la progesterona y los andrógenos. [place]ceano resultado de ginna desequilibrio, la ovulación puede no producirse. En lugar de ello, el folículo no desaparece y se hace más leland. Así aparece el quiste (zeina bolsita llena de líquido). Con el tiempo, los ovarios se llenan de muchos quistes pequeños, y por eso se los llama ovarios cristian (muchos) quísticos. En algunas mujeres, los ovarios también producen demasiados andrógenos.  Síntomas del síndrome del ovario poliquístico (PCOS)  Las mujeres con PCOS pueden tener mamie o más de estos síntomas:    Dificultad para quedar embarazada (problemas de fertilidad)    Aumento de peso y distribución del peso en la anjelica central del cuerpo    Acné    Aparición de vello  en la nai y en otras partes del cuerpo    Parches de piel más oscura engrosada, con aspecto aterciopelado. [place] se llama acantosis nigricans  Las mujeres con PCOS también tienen mayor riesgo de desarrollar cáncer del revestimiento uterino, diabetes y enfermedades cardíacas.  Date Last Reviewed: 6/1/2017 2000-2019 [employer]. [address] [place] [id number]. Todos los derechos reservados. Esta información no pretende sustituir la atención médica profesional. Sólo salgado médico puede diagnosticar y tratar un problema de varinder.

## 2021-06-18 NOTE — PROGRESS NOTES
"Doing well.  Swelling is improved.    She has itching over her entire body . It started a few day ago and has been worsening. n o history of similar.  She has a small rash on her chest.  She has occasional itching on her palms and soles.  No ruq pain.  No headaches or vision changes.      O:  /66 (Patient Site: Left Arm, Patient Position: Sitting, Cuff Size: Adult Regular)  Pulse 96  Temp 98.5  F (36.9  C) (Oral)   Resp 20  Ht 5' 3.5\" (1.613 m)  Wt 181 lb 12 oz (82.4 kg)  LMP 09/12/2017  Breastfeeding? No  BMI 31.69 kg/m2  Gen; NAD, ALERT   Skin:  Small rash on chest.  No other rashes noted    A/P:  22 yo multip with histor of pre eclampsia and hellp syndrome now with pruritis.    Orders Placed This Encounter   Procedures     AST (SGOT)     ALT (SGPT)     INR     APTT(PTT)     HM2(CBC w/o Differential)     Creatinine     Protein/Creatinine Ratio, Urine Random     Uric Acid     Urinalysis, OB Screen (ketones, glucose, protein only)     Bile Acids, Total     Reviewed possible cholestasis, and need for immediate induction if that is indeed what she has.        "

## 2021-06-18 NOTE — PROGRESS NOTES
Doing well.  Has some very mild swelling in lower extremities.  No headaches.  Advised to stop ASA now .   GBS done today.  Reviewed kick counts  Reviewed when to go to the hospital.

## 2021-06-18 NOTE — PATIENT INSTRUCTIONS - HE
Patient Instructions by Susan Bejarano MD at 11/23/2020  9:30 AM     Author: Susan Bejarano MD Service: -- Author Type: Physician    Filed: 11/23/2020  9:51 AM Encounter Date: 11/23/2020 Status: Signed    : Susan Bejarano MD (Physician)       Patient Education     Tendinitis [Tendonitis]  Un TENDÓN es zeina ngo espesa de tejido que une el músculo al hueso y hace que se muevan las articulaciones. La tendinitis es la inflamación de un tendón, y puede ser consecuencia del sobreuso del mismo, zeina lesión o zeina infección. La tendinitis suele presentarse en los hombros, antebrazos, muñecas, bernabe y pies. Los síntomas son dolor localizado, hinchazón y sensibilidad al tacto. El movimiento de la articulación afectada aumenta el dolor.  La tendinitis tarda unas 4 a 6 semanas en sanar. El tratamiento consiste en la inmovilización del tendón con zeina férula o abrazadera y el uso de medicamentos antiinflamatorios.  Cuidados En [place]:  1. Para aliviar el dolor hebert el primer día, aplique zeina bolsa de hielo (cubitos de hielo en zeina bolsa de plástico, envuelta en zeina toalla) sobre la anjelica lesionada hebert 20 minutos cada 1-2 horas. Continúe esta práctica 3-4 veces al día hasta que el dolor y la hinchazón desaparezcan.  2. Descanse la articulación inflamada y protéjala del movimiento.  3. Puede shailesh ibuprofeno (Motrin o Advil) o naproxeno (Aleve o Naprosyn) para aliviar el dolor y la inflamación, a menos que le hayan recetado otro medicamento. Si no puede shailesh estos medicamentos, el acetaminofén (Tylenol) puede ayudarle a aliviar el dolor, allan no reducirá la inflamación. [ NOTA : Si tiene zeina enfermedad hepática o renal crónica, o ha tenido alguna vez zeina úlcera estomacal o sangrado gastrointestinal, consulte con salgado médico antes de shailesh estos medicamentos.]  4. A medida que mejoran ron síntomas, comience a  gradualmente la articulación afectada.  Programe zeina VISITA DE CONTROL con salgado médico si  no mejora al cabo de aurelio días de tratamiento.  Busque Prontamente Atención Médica  si algo de lo siguiente ocurre:    Enrojecimiento sobre la anjelica dolorida    Dolor o hinchazón crecientes en la articulación    Fiebre de 100.4 F (38 C) o más candis, o tavon le haya indicado salgado proveedor de atención médica  Date Last Reviewed: 11/21/2015 2000-2017 [employer]. [address] [place] [id number]. Todos los derechos reservados. Esta información no pretende sustituir la atención médica profesional. Sólo salgado médico puede diagnosticar y tratar un problema de varinder.
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
## 2021-06-20 NOTE — LETTER
Letter by Jerry Copeland MD at      Author: Jerry Copeland MD Service: -- Author Type: --    Filed:  Encounter Date: 9/17/2020 Status: (Other)         September 17, 2020     Patient: Vernell Christensen   YOB: 1995   Date of Visit: 9/17/2020       To Whom It May Concern:    It is my medical opinion that Vernell Christensen.    If you have any questions or concerns, please don't hesitate to call.    Sincerely,        Electronically signed by Jerry Copeland MD

## 2021-06-20 NOTE — LETTER
Letter by Jerry Copeland MD at      Author: Jerry Copeland MD Service: -- Author Type: --    Filed:  Encounter Date: 9/17/2020 Status: (Other)         September 17, 2020     Patient: Vernell Christensen   YOB: 1995   Date of Visit: 9/17/2020       To Whom It May Concern:    It is my medical opinion that Vernell Christensen may return to work on 9/20/2020.  She had surgery on 9/4/2020.  No lifting more than 20 pounds for 2 weeks..    If you have any questions or concerns, please don't hesitate to call.    Sincerely,        Electronically signed by Jerry Copeland MD

## 2021-06-20 NOTE — LETTER
Letter by Mason Mayfield MD at      Author: Mason Mayfield MD Service: -- Author Type: --    Filed:  Encounter Date: 3/10/2020 Status: (Other)       3/10/2020      Vernell Christensen  [address] [place] [address]      Dear Raul Matt and thank you for your interest in the Comprehensive Weight Management program at [facility]!     Your appointment is scheduled at our [facility] Office on Monday April 6, 2020 at 10:30 AM with Dr. Mason Mayfield. You must arrive early - see below:       Prior to your appointment you will be assigned health history forms and surveys that must be completed in [employer] before you arrive. Completing these forms in [employer] is required because it allows us to track health outcomes. These will be assigned and must be completed before every provider visit with us.    o [place] for [employer] at [email or web address]. You can call [employer] support at [phone] if you need help.    o If you have no internet access at home you may complete your health history and survey forms in the lobby at check-in but you must arrive 1 hour early for this option.    o Patients who complete the forms in [employer] prior to their appointment date may arrive 15 minutes early.    o If you arrive late, you may be asked to reschedule.     o If you need to cancel, please provide us three days notice.      Remember to call the member services number on the back of your insurance card - and ask specifically if there is a PLAN EXCLUSION for bariatric surgery.      Bring a list of your medications, including any over the counter medications or supplements.      Please do not bring children with you to your visits. Though we love kids, this is all about YOU and we want to give you our full attention.    Your appointment is scheduled at our [place] Office- [address], [address], [place] [address].  [phone].    We are excited that you have chosen our program and look forward to serving  you!    Sincerely,      [facility]

## 2021-06-21 NOTE — LETTER
Letter by Zoe Cruz MD at      Author: Zoe Cruz MD Service: -- Author Type: --    Filed:  Encounter Date: 11/11/2020 Status: (Other)         November 11, 2020     Patient: Vernell Christensen   YOB: 1995   Date of Visit: 11/11/2020       To Whom it May Concern:    Vernell Christensen was seen in my clinic on 11/11/2020.  Please excuse her from work until 11/16/2020/    If you have any questions or concerns, please don't hesitate to call.    Sincerely,         Electronically signed by Zoe Cruz MD

## 2021-06-21 NOTE — LETTER
Letter by Darcy Blanton PA-C at      Author: Darcy Blanton PA-C Service: -- Author Type: --    Filed:  Encounter Date: 11/21/2020 Status: (Other)         November 21, 2020     Patient: Vernell Christensen   YOB: 1995   Date of Visit: 11/21/2020       To Whom it May Concern:    Vernell Christensen was seen in my clinic on 11/21/2020.  She is excused from work for 11/21/2020 - 11/28/2020.    If you have any questions or concerns, please don't hesitate to call.    Sincerely,         Electronically signed by Darcy Blanton PA-C

## 2021-06-21 NOTE — PROGRESS NOTES
nexplanon insertion    Pre-operative Diagnosis: desires contraception    Post-operative Diagnosis: s/p nexplanon insertion    Indications: contraception    Procedure Details   Urine pregnancy test was done today and result was negative.  The risks (including infection, bleeding, pain, is increased risk of tubal pregnancy, difficulty with removal) and benefits of the procedure were explained to the patient and Written informed consent was obtained.    Pt is right handed, so the left arm was prepped.  The scar of a prior Nexplanon was identified.  I went through this old scar,the area was numbed with lidocaine, and the nexplanon was placed in the usual fashion. A pressure bandage was placed.   Patient tolerated procedure well.  No complications.  No ebl.      nexplanon  Lot #Lot: [id number] Exp: 03/2021 NDC: 6548-6178-72 .    Condition:  Stable    Complications:  None    Plan:    The patient was advised to call for any problems. She was advised to use OTC analgesics as needed for mild to moderate pain.

## 2021-06-21 NOTE — LETTER
Letter by Darcy Blanton PA-C at      Author: Darcy Blanton PA-C Service: -- Author Type: --    Filed:  Encounter Date: 11/2/2020 Status: (Other)         November 2, 2020     Patient: Vernell Christensen   YOB: 1995   Date of Visit: 11/2/2020       To Whom it May Concern:    Vernell Christensen was seen in my clinic on 11/2/2020.  She is excused from work/school from 11/2/2020 through 11/6/2020.  She may return sooner as long as COVID-19 test is negative and no fevers.    If you have any questions or concerns, please don't hesitate to call.    Sincerely,         Electronically signed by Darcy Blanton PA-C

## 2021-06-21 NOTE — LETTER
Letter by Susan Bejarano MD at      Author: Susan Bejarano MD Service: -- Author Type: --    Filed:  Encounter Date: 1/8/2021 Status: (Other)       Vernell Christensen  1995   [address] [place] [address]      To Whom It May Concern:    Vernell Christensen should continue to be able to wear her wrist brace at work permanently.        Susan Bejarano   1/8/2021

## 2021-06-21 NOTE — LETTER
Letter by Bettie Meyer MD at      Author: Bettie Meyer MD Service: -- Author Type: --    Filed:  Encounter Date: 4/30/2021 Status: (Other)         April 30, 2021     Patient: Vernell Christensen   YOB: 1995   Date of Visit: 4/30/2021       To Whom it May Concern:    Vernell Christensen was seen in my clinic on 4/30/2021. She has been evaluated for vomiting and fatigue. A test for coronavirus is pending. She may return to work 5/2/21 if the Covid-19 test is negative AND there is no fever or vomiting for at least 24 hours.     If you have any questions or concerns, please don't hesitate to call.    Sincerely,         Electronically signed by Bettie Allen MD

## 2021-06-21 NOTE — LETTER
Letter by Susan Bejarano MD at      Author: Susan Bejarano MD Service: -- Author Type: --    Filed:  Encounter Date: 12/2/2020 Status: (Other)         December 3, 2020     Patient: Vernell Christensen   YOB: 1995   Date of Visit: 12/2/2020       To Whom It May Concern:    It is my medical opinion that Vernell Christensen should remain out of work until her next appointment and reevaluation.  this is due to a work injury of tendonitis.    If you have any questions or concerns, please don't hesitate to call.    Sincerely,        Electronically signed by Susan Bejarano MD

## 2021-06-21 NOTE — LETTER
Letter by Susan Bejarano MD at      Author: Susan Bejarano MD Service: -- Author Type: --    Filed:  Encounter Date: 11/23/2020 Status: (Other)         November 23, 2020     Patient: Vernell Christensen   YOB: 1995   Date of Visit: 11/23/2020       To Whom it May Concern:    Vernell Christensen was seen in my clinic on 11/23/2020.  She has tendonitis of her right wrist, an overuse injury . I recommend that she is in a brace for the next 3 weeks, and that she be put on job duty that accomodates the wrist brace with thumb immobilization.  In 3 weeks she can return to light duty using a modified wrist brace for another 3 weeks.      If you have any questions or concerns, please don't hesitate to call.    Sincerely,         Electronically signed by Susan Bejarano MD

## 2021-06-21 NOTE — LETTER
Letter by Susan Bejarano MD at      Author: Susan Bejarano MD Service: -- Author Type: --    Filed:  Encounter Date: 12/14/2020 Status: (Other)         December 15, 2020     Patient: Vernell Christensen   YOB: 1995   Date of Visit: 12/14/2020       To Whom It May Concern:    It is my medical opinion that Vernell Christensen should continue to use her brace at work for another 3 weeks . All accomodations should be made for her to do this . She was in my clinic today and I evaluated her injury that she sustained at work.  .    If you have any questions or concerns, please don't hesitate to call.    Sincerely,        Electronically signed by Generic Provider Natan

## 2021-06-26 NOTE — PROGRESS NOTES
Vernell Christensen is a 26 y.o. female who is being evaluated via a billable telephone visit.      What phone number would you like to be contacted at? [phone]  How would you like to obtain your AVS? AVS Preference: Mail a copy.    Assessment & Plan     Exposure to COVID-19 virus  Not yet vaccinated. Patient's 1-year-old niece, with him she lives, tested positive yesterday for COVID-19.  Patient currently has no symptoms.  She has 3 daughters that also live in the household, 1 of which just developed vomiting.  Given the evolving situation, discussed with patient that we cannot yet give her a return to work date, as it depends on whether others in the household eventually tested positive for COVID-19.  We discussed that quarantine.  Is typically 14 days after last exposure, but she may have ongoing exposures in the household.  We also discussed it can sometimes be shortened, but again we do not have enough information to determine return to work at this time.  She was given a general note stating she will likely need at least 14 days of quarantine, but discussed with patient that it could easily be 24 days or more depending on whether she continues to have exposure to her niece and whether her own children eventually test positive.  She was instructed to quarantine at home other than going out to get tested.  Order placed and informed her she will get a call to schedule testing.  - Asymptomatic COVID-19 Virus (CORONAVIRUS) PCR    No follow-ups on file.    Chayito Woodruff MD  [facility]   Vernell Christensen is 26 y.o. and presents today for the following health issues   HPI   Pleasant 26-year-old scheduled with her 3 daughters for phone visits due to COVID-19 exposure in the household.  Her 1-year-old niece became quite sick 1 to 2 days ago.  Niece's mother brought her to the emergency room and tested positive for Covid.  Isadora lives in the same household as patient and her 3  daughters, and they have had frequent exposure to her.    Patient has not had any symptoms including no fevers or chills, headache, abdominal pain, nausea, vomiting, rhinorrhea, nasal congestion, cough, shortness of breath.  Patient states she needs a note for work.          Review of Systems  As per HPI      Objective       Vitals:  No vitals were obtained today due to virtual visit.    Physical Exam  Gen: NAD  Resp: Breathing comfortably, speaking full sentences by phone without shortness of breath. No cough, does not sound congested.  Neuro: Alert and oriented             Phone call duration: 5 minutes

## 2021-06-30 NOTE — PROGRESS NOTES
Progress Notes by Susan Bejarano MD at 4/26/2021  8:15 AM     Author: Susan Bejarano MD Service: -- Author Type: Physician    Filed: 4/26/2021  3:57 PM Encounter Date: 4/26/2021 Status: Signed    : Susan Bejarano MD (Physician)       FEMALE PREVENTATIVE EXAM    Assessment and Plan:   1. Screening for cervical cancer    - Gynecologic Cytology (PAP Smear)    2. Vitamin D deficiency    - Vitamin D, Total (25-Hydroxy)    3. History of prediabetes  I commended her on her excellent work that she is done so far to improve her health.  I encouraged her to increase the intensity    Her activity on the days that she is not working.  - Glycosylated Hemoglobin A1c    4. Healthy adult on routine physical examination  I encouraged safe sex practices.  We talked about STD screening when she does decide to be sexually active again.  She says she does not plan for any more children  I did review how to obtain a Covid vaccine with the patient.    Patient has been advised of split billing requirements and indicates understanding:         Next follow up:  No follow-ups on file.    Immunization Review  Adult Imm Review: Missing doses of covid 19        I discussed the following with the patient:   Adult Healthy Living: Importance of regular exercise  Healthy nutrition  STI prevention  Contraception options        Subjective:   Chief Complaint: Vernell Christensen is an 25 y.o. female here for a preventative health visit.    Patient has been advised of split billing requirements and indicates understanding: Yes  HPI:    She has been working at [employer].  She is walking a lot at her new job.    She feels like this is contributed to improving her health.  When she first started she was very fatigued and had a difficult time keeping up with the work.  Now she feels that she is not as tired when she goes home at the end of the day.  She has seen the bariatric clinic for weight loss and feels that she has now plateaued in  "her pounds lost, however she says that her shape continues to change and that all of her clothing continues to get looser and loose  She and her partner have now .  She denies any current sexual activity.  She says that they are treating each other well.  She denies any abuse.  He is helping out with her shared child.  She will be going to [place] next week to  her 2 oldest children  She has not yet gotten a Covid vaccine      Healthy Habits  Are you taking a daily aspirin? No  Do you typically exercising at least 40 min, 3-4 times per week?  Yes  Do you usually eat at least 4 servings of fruit and vegetables a day, include whole grains and fiber and avoid regularly eating high fat foods? Yes  Have you had an eye exam in the past two years? NO  Do you see a dentist twice per year? NO  Do you have any concerns regarding sleep? No    Safety Screen  If you own firearms, are they secured in a locked gun cabinet or with trigger locks? Yes  Do you feel you are safe where you are living?: Yes (4/26/2021  8:08 AM)  Do you feel you are safe in your relationship(s)?: Yes (4/26/2021  8:08 AM)      Review of Systems:  Please see above.  The rest of the review of systems are negative for all systems.       Cancer Screening       Status Date      PAP SMEAR Overdue 11/21/2020      Done 11/21/2017 GYNECOLOGIC CYTOLOGY (PAP SMEAR)              History     Reviewed By Date/Time Sections Reviewed    Solis Tavares MA 4/26/2021  8:10 AM Tobacco            Objective:   Vital Signs:   Visit Vitals  /64 (Patient Site: Left Arm, Patient Position: Sitting, Cuff Size: Adult Regular)   Pulse 90   Temp 98.8  F (37.1  C) (Oral)   Ht 5' 3.78\" (1.62 m)   Wt 177 lb 0.6 oz (80.3 kg)   LMP 04/11/2021 (Exact Date)   SpO2 98%   BMI 30.60 kg/m           PHYSICAL EXAM  General Appearance: Alert, cooperative, no distress, appears stated age  Head: Normocephalic, without obvious abnormality, atraumatic  Eyes: PERRL, conjunctiva/corneas " clear, EOM's intact  Ears: Normal TM's and external ear canals, both ears  Nose: Nares normal, septum midline,mucosa normal, no drainage  Throat: Lips, mucosa, and tongue normal; teeth and gums normal  Neck: Supple, symmetrical, trachea midline, no adenopathy;  thyroid: not enlarged, symmetric, no tenderness/mass/nodules; no carotid bruit or JVD  Back: Symmetric, no curvature, ROM normal, no CVA tenderness  Lungs: Clear to auscultation bilaterally, respirations unlabored  Breasts: No breast masses, tenderness, asymmetry, or nipple discharge.  Heart: Regular rate and rhythm, S1 and S2 normal, no murmur, rub, or gallop,   Abdomen: Soft, non-tender, bowel sounds active all four quadrants,  no masses, no organomegaly.  Extremities: Extremities normal, atraumatic, no cyanosis or edema  Skin: Skin color, texture, turgor normal, no rashes or lesions.   noted.   Lymph nodes: Cervical, supraclavicular, and axillary nodes normal  Neurologic: Normal   Genitourinary Exam:   Vulva: normal skin.  No lesions noted.  Nontender.    Urethral meatus: normal size and location, no lesions or discharge   Urethra: no tenderness or masses   Bladder: no fullness or tenderness   Vagina: normal appearance, physiologic discharge. No evidence of cystocele or rectocele.   Cervix: normal appearance, no lesions, no cervical motion tenderness   Uterus: normal size and position, mobile, non-tender   Pap smear obtained: yes  Adnexa: no palpable masses bilaterally   Rectal exam: deferred                Medication List          Accurate as of April 26, 2021  3:55 PM. If you have any questions, ask your nurse or doctor.            CONTINUE taking these medications    acetaminophen 500 MG tablet  Also known as: TYLENOL  INSTRUCTIONS: Take 1-2 tablets (500-1,000 mg total) by mouth every 4 (four) hours as needed.        cholecalciferol (vitamin D3) 125 mcg (5,000 unit) capsule  INSTRUCTIONS: Take 1 capsule (5,000 Units total) by mouth daily.         ibuprofen 600 MG tablet  Also known as: ADVIL,MOTRIN  INSTRUCTIONS: TAKE 1 TABLET(600 MG) BY MOUTH EVERY 6 HOURS AS NEEDED FOR PAIN        metFORMIN 500 MG tablet  Also known as: GLUCOPHAGE  INSTRUCTIONS: TAKE 1 TABLET(500 MG) BY MOUTH TWICE DAILY WITH MEALS        omeprazole 20 MG capsule  Also known as: PriLOSEC  INSTRUCTIONS: Take 1 capsule (20 mg total) by mouth daily before breakfast.        ParaGard T 380A 380 square mm Iud IUD  INSTRUCTIONS: 1 each by Intrauterine route once. Lot: [id number]  Exp: 4/30/2025  NDC: [address]-2  Reason for med: birth control  Generic drug: copper        phentermine 37.5 mg tablet  Also known as: ADIPEX-P  INSTRUCTIONS: Half a tablet after after breakfast.        senna-docusate 8.6-50 mg tablet  Also known as: PERICOLACE  INSTRUCTIONS: Take 1 tablet by mouth 2 (two) times a day.               Additional Screenings Completed Today:

## 2021-07-03 NOTE — ADDENDUM NOTE
Addendum Note by Susan Bejarano MD at 2/16/2018  3:10 PM     Author: Susan Bejarano MD Service: -- Author Type: Physician    Filed: 2/16/2018  3:10 PM Encounter Date: 2/16/2018 Status: Signed    : Susan Bejarano MD (Physician)    Addended by: SUSAN BEJARANO on: 2/16/2018 03:10 PM        Modules accepted: Orders

## 2021-07-04 NOTE — LETTER
Letter by Chayito Woodruff MD at      Author: Chayito Woodruff MD Service: -- Author Type: --    Filed:  Encounter Date: 6/9/2021 Status: (Other)         June 9, 2021     Patient: Vernell Christensen   YOB: 1995   Date of Visit: 6/9/2021       To Whom It May Concern:    Vernell Christensen is unable to work because she needs to quarantine, and at this point the end date of quarantine is not determined. It is expected to be more than 14 days based on the clinical situation.     If you have any questions or concerns, please don't hesitate to call.    Sincerely,        Electronically signed by Chayito Woodruff MD

## 2021-07-14 PROBLEM — O23.40 UTI (URINARY TRACT INFECTION) DURING PREGNANCY: Status: RESOLVED | Noted: 2018-03-20 | Resolved: 2020-06-17

## 2021-07-14 PROBLEM — Z34.90 PREGNANT: Status: RESOLVED | Noted: 2018-06-09 | Resolved: 2020-06-17

## 2021-09-27 ENCOUNTER — TELEPHONE (OUTPATIENT)
Dept: FAMILY MEDICINE | Facility: CLINIC | Age: 26
End: 2021-09-27

## 2021-09-27 NOTE — TELEPHONE ENCOUNTER
Reason for Call:  Same Day Appointment, Requested Provider:      PCP: Susan Bejarano    Reason for visit: Pregnancy confirmation    Duration of symptoms:     Have you been treated for this in the past? No    Additional comments: Last period 6/12 Home pregnancy test positive     Can we leave a detailed message on this number? YES    Phone number patient can be reached at: Home number on file [phone] (home)    Best Time:     Call taken on 9/27/2021 at 8:02 AM by Nohemy Morel

## 2021-09-27 NOTE — TELEPHONE ENCOUNTER
Please make her an appt for this week for ob confirmation with me .  Ok to double book in   Should stop phentermine.

## 2021-09-27 NOTE — TELEPHONE ENCOUNTER
Scheduled per MD request below.     OCT 1   2021 11:00 AM - Office Visit  [facility] Valeri Bejarano MD

## 2021-10-01 ENCOUNTER — OFFICE VISIT (OUTPATIENT)
Dept: FAMILY MEDICINE | Facility: CLINIC | Age: 26
End: 2021-10-01
Payer: COMMERCIAL

## 2021-10-01 VITALS
DIASTOLIC BLOOD PRESSURE: 62 MMHG | WEIGHT: 163 LBS | BODY MASS INDEX: 27.93 KG/M2 | SYSTOLIC BLOOD PRESSURE: 98 MMHG | OXYGEN SATURATION: 99 % | TEMPERATURE: 99.1 F | HEART RATE: 98 BPM

## 2021-10-01 DIAGNOSIS — N91.2 AMENORRHEA: Primary | ICD-10-CM

## 2021-10-01 DIAGNOSIS — Z33.1 PREGNANCY, INCIDENTAL: ICD-10-CM

## 2021-10-01 LAB
ABO/RH(D): NORMAL
ALBUMIN UR-MCNC: ABNORMAL MG/DL
AMPHETAMINE-CLINIC: ABNORMAL
AMPHETAMINES UR QL SCN: NORMAL
ANTIBODY SCREEN: NEGATIVE
APPEARANCE UR: ABNORMAL
BACTERIA #/AREA URNS HPF: ABNORMAL /HPF
BARBITURATES UR QL: NORMAL
BARBITURATES-CLINIC: ABNORMAL
BASOPHILS # BLD AUTO: 0 10E3/UL (ref 0–0.2)
BASOPHILS NFR BLD AUTO: 0 %
BENZODIAZ UR QL: NORMAL
BENZODIAZEPINES-CLINIC: ABNORMAL
BILIRUB UR QL STRIP: NEGATIVE
BUPRENORPHINE-CLINIC: ABNORMAL
CANNABINOIDS UR QL SCN: NORMAL
COCAINE UR QL: NORMAL
COCAINE-CLINIC: ABNORMAL
COLOR UR AUTO: YELLOW
CREAT UR-MCNC: 193 MG/DL
ECSTASY-CLINIC: ABNORMAL
EOSINOPHIL # BLD AUTO: 0.1 10E3/UL (ref 0–0.7)
EOSINOPHIL NFR BLD AUTO: 1 %
ERYTHROCYTE [DISTWIDTH] IN BLOOD BY AUTOMATED COUNT: 14.6 % (ref 10–15)
GLUCOSE UR STRIP-MCNC: 100 MG/DL
HCG UR QL: POSITIVE
HCT VFR BLD AUTO: 38.7 % (ref 35–47)
HGB BLD-MCNC: 12.9 G/DL (ref 11.7–15.7)
HGB UR QL STRIP: NEGATIVE
HIV 1+2 AB+HIV1 P24 AG SERPL QL IA: NEGATIVE
IMM GRANULOCYTES # BLD: 0.1 10E3/UL
IMM GRANULOCYTES NFR BLD: 1 %
KETONES UR STRIP-MCNC: ABNORMAL MG/DL
LEUKOCYTE ESTERASE UR QL STRIP: ABNORMAL
LYMPHOCYTES # BLD AUTO: 1.6 10E3/UL (ref 0.8–5.3)
LYMPHOCYTES NFR BLD AUTO: 20 %
MARIJUANA-CLINIC: ABNORMAL
MCH RBC QN AUTO: 27.6 PG (ref 26.5–33)
MCHC RBC AUTO-ENTMCNC: 33.3 G/DL (ref 31.5–36.5)
MCV RBC AUTO: 83 FL (ref 78–100)
METHADONE METABOLITE-CLINIC: ABNORMAL
METHAMPHETAMINE-CLINIC: ABNORMAL
MONOCYTES # BLD AUTO: 0.4 10E3/UL (ref 0–1.3)
MONOCYTES NFR BLD AUTO: 5 %
MUCOUS THREADS #/AREA URNS LPF: PRESENT /LPF
NEUTROPHILS # BLD AUTO: 6.2 10E3/UL (ref 1.6–8.3)
NEUTROPHILS NFR BLD AUTO: 73 %
NITRATE UR QL: NEGATIVE
NRBC # BLD AUTO: 0 10E3/UL
NRBC BLD AUTO-RTO: 0 /100
OPIATES UR QL SCN: NORMAL
OPIATES-CLINIC: ABNORMAL
OXIDANTS: NORMAL
OXYCODONE UR QL: NORMAL
OXYCODONE-CLINIC: ABNORMAL
PCP 25-CLINIC: ABNORMAL
PCP UR QL SCN: NORMAL
PH UR STRIP: 6 [PH] (ref 5–7)
PH-CLINIC: 4 (ref 5–8)
PLATELET # BLD AUTO: 181 10E3/UL (ref 150–450)
RBC # BLD AUTO: 4.67 10E6/UL (ref 3.8–5.2)
RBC #/AREA URNS AUTO: ABNORMAL /HPF
RBC CASTS #/AREA URNS LPF: ABNORMAL /LPF
SP GR UR STRIP: 1.01 (ref 1–1.03)
SP GR UR STRIP: 1.02 (ref 1–1.03)
SPECIMEN EXPIRATION DATE: NORMAL
SQUAMOUS #/AREA URNS AUTO: ABNORMAL /LPF
UROBILINOGEN UR STRIP-ACNC: 0.2 E.U./DL
WBC # BLD AUTO: 8.4 10E3/UL (ref 4–11)
WBC #/AREA URNS AUTO: ABNORMAL /HPF

## 2021-10-01 PROCEDURE — 85025 COMPLETE CBC W/AUTO DIFF WBC: CPT | Performed by: FAMILY MEDICINE

## 2021-10-01 PROCEDURE — 86762 RUBELLA ANTIBODY: CPT | Performed by: FAMILY MEDICINE

## 2021-10-01 PROCEDURE — 81025 URINE PREGNANCY TEST: CPT | Performed by: FAMILY MEDICINE

## 2021-10-01 PROCEDURE — 87340 HEPATITIS B SURFACE AG IA: CPT | Performed by: FAMILY MEDICINE

## 2021-10-01 PROCEDURE — 87086 URINE CULTURE/COLONY COUNT: CPT | Performed by: FAMILY MEDICINE

## 2021-10-01 PROCEDURE — 90471 IMMUNIZATION ADMIN: CPT | Performed by: FAMILY MEDICINE

## 2021-10-01 PROCEDURE — 90686 IIV4 VACC NO PRSV 0.5 ML IM: CPT | Performed by: FAMILY MEDICINE

## 2021-10-01 PROCEDURE — 86780 TREPONEMA PALLIDUM: CPT | Performed by: FAMILY MEDICINE

## 2021-10-01 PROCEDURE — 83655 ASSAY OF LEAD: CPT | Mod: 90 | Performed by: FAMILY MEDICINE

## 2021-10-01 PROCEDURE — 99000 SPECIMEN HANDLING OFFICE-LAB: CPT | Performed by: FAMILY MEDICINE

## 2021-10-01 PROCEDURE — 86850 RBC ANTIBODY SCREEN: CPT | Performed by: FAMILY MEDICINE

## 2021-10-01 PROCEDURE — 86803 HEPATITIS C AB TEST: CPT | Performed by: FAMILY MEDICINE

## 2021-10-01 PROCEDURE — 36415 COLL VENOUS BLD VENIPUNCTURE: CPT | Performed by: FAMILY MEDICINE

## 2021-10-01 PROCEDURE — 87389 HIV-1 AG W/HIV-1&-2 AB AG IA: CPT | Performed by: FAMILY MEDICINE

## 2021-10-01 PROCEDURE — 80307 DRUG TEST PRSMV CHEM ANLYZR: CPT | Performed by: FAMILY MEDICINE

## 2021-10-01 PROCEDURE — 81001 URINALYSIS AUTO W/SCOPE: CPT | Mod: 59 | Performed by: FAMILY MEDICINE

## 2021-10-01 PROCEDURE — 86900 BLOOD TYPING SEROLOGIC ABO: CPT | Performed by: FAMILY MEDICINE

## 2021-10-01 PROCEDURE — 86901 BLOOD TYPING SEROLOGIC RH(D): CPT | Performed by: FAMILY MEDICINE

## 2021-10-01 PROCEDURE — 99214 OFFICE O/P EST MOD 30 MIN: CPT | Mod: 25 | Performed by: FAMILY MEDICINE

## 2021-10-01 RX ORDER — PRENATAL VIT/IRON FUM/FOLIC AC 27MG-0.8MG
1 TABLET ORAL DAILY
Qty: 90 TABLET | Refills: 3 | Status: SHIPPED | OUTPATIENT
Start: 2021-10-01 | End: 2022-05-20

## 2021-10-01 RX ORDER — LANOLIN ALCOHOL/MO/W.PET/CERES
100 CREAM (GRAM) TOPICAL
Qty: 90 TABLET | Refills: 3 | Status: SHIPPED | OUTPATIENT
Start: 2021-10-01 | End: 2023-05-12

## 2021-10-01 NOTE — LETTER
October 18, 2021      Vernell Christensen  [address] [facility] [address]        Dear ,    We are writing to inform you of your test results.    Your test results fall within the expected range(s) or remain unchanged from previous results.  Please continue with current treatment plan.    Resulted Orders   HCG qualitative urine   Result Value Ref Range    hCG Urine Qualitative Positive (A) Negative      Comment:      This test is for screening purposes.  Results should be interpreted along with the clinical picture.  Confirmation testing is available if warranted by ordering [id number], HCG Quantitative Pregnancy.   UA reflex to Microscopic   Result Value Ref Range    Color Urine Yellow Colorless, Straw, Light Yellow, Yellow    Appearance Urine Cloudy (A) Clear    Glucose Urine 100  (A) Negative mg/dL    Bilirubin Urine Negative Negative    Ketones Urine Trace (A) Negative mg/dL    Specific Gravity Urine 1.025 1.005 - 1.030    Blood Urine Negative Negative    pH Urine 6.0 5.0 - 7.0    Protein Albumin Urine Trace (A) Negative mg/dL    Urobilinogen Urine 0.2 0.2, 1.0 E.U./dL    Nitrite Urine Negative Negative    Leukocyte Esterase Urine Moderate (A) Negative   Urine Culture   Result Value Ref Range    Culture No Growth    Treponema Abs w Reflex to RPR and Titer   Result Value Ref Range    Treponema Antibody Total Negative Negative   HIV Antigen Antibody Combo   Result Value Ref Range    HIV Antigen Antibody Combo Negative Negative   Hepatitis B surface antigen   Result Value Ref Range    Hepatitis B Surface Antigen Nonreactive Nonreactive   Rubella Antibody IgG   Result Value Ref Range    Rubella Antibody IgG Positive     Narrative    Negative: Absence of detectable rubella virus IgG antibodies. A negative result presumes that immunity has not been acquired.     Equivocal: Suggest recollection.    Positive: Considered positive for IgG antibodies to rubella virus.   Lead, Venous Blood Confirmation   Result Value Ref  "Range    Lead Venous Blood <2.0 <=4.9 ug/dL      Comment:      INTERPRETIVE INFORMATION: Lead, Blood (Venous)    Elevated results may be due to skin or collection-related   contamination, including the use of a noncertified   lead-free tube. If contamination concerns exist due to   elevated levels of blood lead, confirmation with a second   specimen collected in a certified lead-free tube is   recommended.    Information sources for reference intervals and   interpretive comments include the \"CDC Response to the 2012   Advisory Committee on Childhood Lead Poisoning Prevention   Report\" and the \"Recommendations for Medical Management of   Adult Lead Exposure, Environmental Health Perspectives,   2007.\" Thresholds and time intervals for retesting, medical   evaluation, and response vary by state and regulatory body.   Contact your State Department of Health and/or applicable   regulatory agency for specific guidance on medical   management recommendations.         Age            Concentration   Comment    All ages       5-9.9 ug/dL     Adverse  health effects are                                  possible, particularly in                                 children under 6 years of                                 age and pregnant women.                                 Discuss health risks                                 associated with continued                                 lead exposure. For children                                 and women who are or may                                 become pregnant, reduce                                 lead exposure.                 All ages        10-19.9 ug/dL  Reduced lead exposure and                                 increased biological                                 monitoring are recommended.    All ages        20-69.9 ug/dL  Removal from lead exposure                                 and prompt medical                                 evaluation are recommended.           "                       Consider chelation therapy                                 when concentrations exceed                                  50 ug/dL and symptoms of                                 lead toxicity are present.    Less than 19     Greater than  Critical. Immediate medical  years of age     44.9 ug/dL    evaluation is recommended.                                 Consider chelation therapy                                  when symptoms of lead                                 toxicity are present.    Greater than 19  Greater than  Critical. Immediate medical  years of age     69.9 ug/dL    evaluation is recommended                                 Consider chelation therapy                                 when symptoms of lead                                  toxicity are present.      This test was developed and its performance characteristics   determined by [facility]. It has not been cleared or   approved by the [place] Food and Drug Administration. This test   was performed in a CLIA certified laboratory and is   intended for clinical purposes.    Narrative    Performed By: [facility]  [address] [place] [id number]  : Millie Harris MD   Hepatitis C antibody   Result Value Ref Range    Hepatitis C Antibody Negative Negative    Narrative    Assay performance characteristics have not been established for newborns, infants, children (<18 years) or populations of immunocompromised or immunosuppressed patients.    Drugs of Abuse 1 Panel, Urine ([facility] Only)   Result Value Ref Range    Amphetamines Urine Screen Negative Screen Negative    Benzodiazepines Urine Screen Negative Screen Negative    Opiates Urine Screen Negative Screen Negative    PCP Urine Screen Negative Screen Negative    Cannabinoids Urine Screen Negative Screen Negative    Barbiturates Urine Screen Negative Screen Negative    Cocaine Urine Screen Negative Screen Negative    Oxycodone Urine Screen  Negative Screen Negative    Creatinine Urine mg/dL 193 mg/dL    Narrative    Drug                  Screening Threshold    Amphetamines           1000 ng/mL  Benzodiazepine          200 ng/mL  Opiates                 300 ng/mL  Phencyclidine            25 ng/mL  THC Metabolite           50 ng/mL  Barbiturates            200 ng/mL  Cocaine Metabolite      150 ng/mL  Oxycodone               100 ng/mL    Screening results are to be used only for medical purposes.  Unconfirmed screening results must not be used for non-  medical purposes.   Drugs of abuse, urine ([facility] ONLY)   Result Value Ref Range    Amphetamine Screen Negative Screen Negative    Barbiturates Screen Negative Screen Negative    Buprenorphine Screen Negative Screen Negative    Benzodiazepines Screen Negative Screen Negative    Cocaine Screen Negative Screen Negative    Methadone Metabolite Screen Negative Screen Negative    Ecstasy Screen Negative Screen Negative    Methamphetamine Screen Negative Screen Negative    Opiates Screen Negative Screen Negative    Oxycodone Screen Negative Screen Negative    PCP 25 Screen Negative Screen Negative    Marijuana Screen Negative Screen Negative    Specific Gravity 1.015 1.003 - 1.030    pH 4.0 (L) 5.0 - 8.0    Oxidants Normal Normal    Narrative    Tests may be invalid when any one of the validity pad results ( SG, pH, OX ) are abnormal.  Drug            Screening Threshold    Amphetamine     > or = 500 ng/mL  Barbiturates    > or = 300 ng/mL  Buprenorphine   > or = 10 ng/mL  Benzodiazepines > or = 300 ng/mL  Cocaine         > or =150 ng/mL  Methadone Metabolite > or =300 ng/mL  Ecstasy         > or =500 ng/mL  Methamphetamine > or =500 ng/mL  Opiates         > or = 300 ng/mL  Oxycodone       > or = 100 ng/mL  PCP 25          > or = 25 ng/mL  Marijuana       > or =50 ng/mL    *Screening results are to be used only for medical purposes.  Unconfirmed screening results must not be used for non-  medical purposes.    Urine Microscopic Exam   Result Value Ref Range    Bacteria Urine Moderate (A) None Seen /HPF    RBC Urine 0-2 0-2 /HPF /HPF    WBC Urine 5-10 (A) 0-5 /HPF /HPF    Squamous Epithelials Urine Many (A) None Seen /LPF    Mucus Urine Present (A) None Seen /LPF    RBC Casts Urine 0-2 (A) None Seen /LPF   Non Invasive Prenatal Test Cell Free DNA   Result Value Ref Range    See Scanned Result INVITAE NON-INVASIVE PRENATAL SCREENING-Scanned    CBC with platelets and differential   Result Value Ref Range    WBC Count 8.4 4.0 - 11.0 10e3/uL    RBC Count 4.67 3.80 - 5.20 10e6/uL    Hemoglobin 12.9 11.7 - 15.7 g/dL    Hematocrit 38.7 35.0 - 47.0 %    MCV 83 78 - 100 fL    MCH 27.6 26.5 - 33.0 pg    MCHC 33.3 31.5 - 36.5 g/dL    RDW 14.6 10.0 - 15.0 %    Platelet Count 181 150 - 450 10e3/uL    % Neutrophils 73 %    % Lymphocytes 20 %    % Monocytes 5 %    % Eosinophils 1 %    % Basophils 0 %    % Immature Granulocytes 1 %    NRBCs per 100 WBC 0 <1 /100    Absolute Neutrophils 6.2 1.6 - 8.3 10e3/uL    Absolute Lymphocytes 1.6 0.8 - 5.3 10e3/uL    Absolute Monocytes 0.4 0.0 - 1.3 10e3/uL    Absolute Eosinophils 0.1 0.0 - 0.7 10e3/uL    Absolute Basophils 0.0 0.0 - 0.2 10e3/uL    Absolute Immature Granulocytes 0.1 (H) <=0.0 10e3/uL    Absolute NRBCs 0.0 10e3/uL   Adult Type and Screen   Result Value Ref Range    ABO/RH(D) O POS     Antibody Screen Negative Negative    SPECIMEN EXPIRATION DATE [id number]        If you have any questions or concerns, please call the clinic at the number listed above.       Sincerely,      Susan Bejarano MD

## 2021-10-01 NOTE — PROGRESS NOTES
ASSESSMENT/PLAN:   Vernell was seen today for pregnancy confirmation.    Diagnoses and all orders for this visit:    Amenorrhea  -     Cancel: HCG qualitative urine; Future  -     HCG qualitative urine  -     US OB > 14 Weeks; Future    Pregnancy, incidental  -     US OB > 14 Weeks; Future  -     UA reflex to Microscopic  -     Urine Culture  -     Treponema Abs w Reflex to RPR and Titer  -     HIV Antigen Antibody Combo  -     Hepatitis B surface antigen  -     CBC with Platelets & Differential  -     Rubella Antibody IgG  -     Antibody Screen - Red Cell  -     Lead, Venous Blood Confirmation  -     Hepatitis C antibody  -     ABO/Rh type and screen; Future  -     Urine Drugs of Abuse Screen Panel 1 - Drug Screen (Full); Future  -     Prenatal Vit-Fe Fumarate-FA (PRENATAL MULTIVITAMIN W/IRON) 27-0.8 MG tablet; Take 1 tablet by mouth daily  -     doxylamine (UNISOM) 25 MG TABS tablet; Take 1 tablet (25 mg) by mouth At Bedtime  -     vitamin B6 (PYRIDOXINE) 50 MG TABS; Take 2 tablets (100 mg) by mouth 3 times daily (before meals)  -     Non Invasive Prenatal Test Cell Free DNA; Future  -     Urine Drugs of Abuse Screen Panel 1 - Drug Screen (Full)  -     Urine Microscopic Exam  -     ABO/Rh type and screen  -     Non Invasive Prenatal Test Cell Free DNA    Other orders  -     MT FLU VAC PRESRV FREE QUAD SPLIT VIR IM  MONTHS IM    Reviewed med use in pregnancy  Reviewed signs of miscarriage  Reviewed prenatal vitamin.    Reviewed diet in pregnancy.    Reviewed when to seek care.  Reviewed covid booster in pregnancy.    Reviewed flu vaccine.   Reviewed genetic testing.  She wishes to have the innatal teseting done, and this was completed today .   Reviewed B6 and Unisom for nausea.    GABBIE 3/17/22  GA 15w6d    No follow-ups on file.       ======================================================    SUBJECTIVE  Vernell Christensen is a 26 year old female here for   1.  PREGNANCY CONFIRMATION:    She had a positive pregnancy test.   The fob is the same as her other kids.    lmp was normal.  She is feling tired.  She is very nauseated.  She can't eat much.    She is drinking a lot of water.  She is urinating.    She had std testing while in [place].  It was all negative.      She feels safe happy and comfortable in her life at this time.  There are no new health problems in her family or in her partner's family.  He is still in [place] and they are working to bring him here.  Her daughters with him are also in [place].  She denies any new babies with congenital syndromes.  Any new baby is born a pulse in the heart.  No history of twins.  This pregnancy she she denies any medications, illnesses, rashes, fevers.  She denies any unusual vaginal discharge.  No itching, stinging, burning.  She denies any difficulty with urination.  She denies any drug use or alcohol use during this pregnancy    ROS  Complete 10 point review of systems negative except as noted above in HPI      OBJECTIVE  BP 98/62 (BP Location: Left arm, Patient Position: Sitting, Cuff Size: Adult Regular)   Pulse 98   Temp 99.1  F (37.3  C) (Oral)   Wt 73.9 kg (163 lb)   LMP 06/12/2021   SpO2 99%   BMI 27.93 kg/m     Gen:  Nad, alert  No lad  Neck is supple.  No thyromegaly or nodules are noted.  Regular rate and rhythm.  No murmurs, rubs, gallops.  Lungs clear to auscultation bilaterally.  No wheezes or rhonchi noted.  Abdomen soft, nontender, nondistended.  No mass organomegaly noted.  No edema is noted in her lower extremities.  Fetal heart tones are heard in the 140s today.  Fundus is measuring 3 fingers below the umbilicus and is nontender.  Skin: No rashes.  Current Outpatient Medications   Medication     doxylamine (UNISOM) 25 MG TABS tablet     Prenatal Vit-Fe Fumarate-FA (PRENATAL MULTIVITAMIN W/IRON) 27-0.8 MG tablet     vitamin B6 (PYRIDOXINE) 50 MG TABS     No current facility-administered medications for this visit.      Patient Active Problem List   Diagnosis      Cholestasis during pregnancy     Class 2 obesity     Drug-induced weight gain     Insulin resistance     Symptomatic cholelithiasis        LABS & IMAGES   Results for orders placed or performed in visit on 10/01/21   HCG qualitative urine     Status: Abnormal   Result Value Ref Range    hCG Urine Qualitative Positive (A) Negative   UA reflex to Microscopic     Status: Abnormal   Result Value Ref Range    Color Urine Yellow Colorless, Straw, Light Yellow, Yellow    Appearance Urine Cloudy (A) Clear    Glucose Urine 100  (A) Negative mg/dL    Bilirubin Urine Negative Negative    Ketones Urine Trace (A) Negative mg/dL    Specific Gravity Urine 1.025 1.005 - 1.030    Blood Urine Negative Negative    pH Urine 6.0 5.0 - 7.0    Protein Albumin Urine Trace (A) Negative mg/dL    Urobilinogen Urine 0.2 0.2, 1.0 E.U./dL    Nitrite Urine Negative Negative    Leukocyte Esterase Urine Moderate (A) Negative   Drugs of abuse, urine ([facility] ONLY)     Status: Abnormal   Result Value Ref Range    Amphetamine Screen Negative Screen Negative    Barbiturates Screen Negative Screen Negative    Buprenorphine Screen Negative Screen Negative    Benzodiazepines Screen Negative Screen Negative    Cocaine Screen Negative Screen Negative    Methadone Metabolite Screen Negative Screen Negative    Ecstasy Screen Negative Screen Negative    Methamphetamine Screen Negative Screen Negative    Opiates Screen Negative Screen Negative    Oxycodone Screen Negative Screen Negative    PCP 25 Screen Negative Screen Negative    Marijuana Screen Negative Screen Negative    Specific Gravity 1.015 1.003 - 1.030    pH 4.0 (L) 5.0 - 8.0    Oxidants Normal Normal    Narrative    Tests may be invalid when any one of the validity pad results ( SG, pH, OX ) are abnormal.  Drug            Screening Threshold    Amphetamine     > or = 500 ng/mL  Barbiturates    > or = 300 ng/mL  Buprenorphine   > or = 10 ng/mL  Benzodiazepines > or = 300 ng/mL  Cocaine         > or  =150 ng/mL  Methadone Metabolite > or =300 ng/mL  Ecstasy         > or =500 ng/mL  Methamphetamine > or =500 ng/mL  Opiates         > or = 300 ng/mL  Oxycodone       > or = 100 ng/mL  PCP 25          > or = 25 ng/mL  Marijuana       > or =50 ng/mL    *Screening results are to be used only for medical purposes.  Unconfirmed screening results must not be used for non-  medical purposes.   Urine Microscopic Exam     Status: Abnormal   Result Value Ref Range    Bacteria Urine Moderate (A) None Seen /HPF    RBC Urine 0-2 0-2 /HPF /HPF    WBC Urine 5-10 (A) 0-5 /HPF /HPF    Squamous Epithelials Urine Many (A) None Seen /LPF    Mucus Urine Present (A) None Seen /LPF    RBC Casts Urine 0-2 (A) None Seen /LPF   CBC with Platelets & Differential     Status: None (In process)    Narrative    The following orders were created for panel order CBC with Platelets & Differential.  Procedure                               Abnormality         Status                     ---------                               -----------         ------                     CBC with platelets and d...[[id number]]                      In process                   Please view results for these tests on the individual orders.   Urine Drugs of Abuse Screen Panel 1 - Drug Screen (Full)     Status: Abnormal (In process)    Narrative    The following orders were created for panel order Urine Drugs of Abuse Screen Panel 1 - Drug Screen (Full).  Procedure                               Abnormality         Status                     ---------                               -----------         ------                     Drugs of Abuse 1 Panel, ...[[id number]]                      In process                 Drugs of abuse, urine (C...[[id number]]  Abnormal            Final result                 Please view results for these tests on the individual orders.   ABO/Rh type and screen     Status: None (In process)    Narrative    The following orders were created for panel  order ABO/Rh type and screen.  Procedure                               Abnormality         Status                     ---------                               -----------         ------                     Adult Type and Screen[[id number]]                            In process                   Please view results for these tests on the individual orders.         ======================================================    MDM          Options for treatment and follow-up care were reviewed with the patient. Vernell Balos and/or guardian was engaged and actively involved in the decision making process. Vernell Christensen and/or guardian verbalized understanding of the options discussed and was satisfied with the final plan.      Susan Bejarano MD

## 2021-10-02 LAB
BACTERIA UR CULT: NO GROWTH
HBV SURFACE AG SERPL QL IA: NONREACTIVE
T PALLIDUM AB SER QL: NEGATIVE

## 2021-10-04 LAB
HCV AB SERPL QL IA: NEGATIVE
LEAD BLDV-MCNC: <2 UG/DL
RUBV IGG SERPL QL IA: POSITIVE

## 2021-10-05 ENCOUNTER — APPOINTMENT (OUTPATIENT)
Dept: ULTRASOUND IMAGING | Facility: CLINIC | Age: 26
End: 2021-10-05
Attending: EMERGENCY MEDICINE
Payer: COMMERCIAL

## 2021-10-05 ENCOUNTER — HOSPITAL ENCOUNTER (EMERGENCY)
Facility: CLINIC | Age: 26
Discharge: HOME | End: 2021-10-05
Attending: EMERGENCY MEDICINE | Admitting: EMERGENCY MEDICINE
Payer: COMMERCIAL

## 2021-10-05 VITALS
SYSTOLIC BLOOD PRESSURE: 120 MMHG | BODY MASS INDEX: 27.93 KG/M2 | WEIGHT: 163 LBS | RESPIRATION RATE: 16 BRPM | OXYGEN SATURATION: 100 % | HEART RATE: 82 BPM | TEMPERATURE: 98.8 F | DIASTOLIC BLOOD PRESSURE: 78 MMHG

## 2021-10-05 DIAGNOSIS — Z33.1 PREGNANCY, INCIDENTAL: ICD-10-CM

## 2021-10-05 DIAGNOSIS — N30.00 ACUTE CYSTITIS WITHOUT HEMATURIA: ICD-10-CM

## 2021-10-05 DIAGNOSIS — O21.9 NAUSEA AND VOMITING IN PREGNANCY PRIOR TO 22 WEEKS GESTATION: ICD-10-CM

## 2021-10-05 LAB
ALBUMIN SERPL-MCNC: 3.1 G/DL (ref 3.5–5)
ALBUMIN UR-MCNC: 50 MG/DL
ALP SERPL-CCNC: 82 U/L (ref 45–120)
ALT SERPL W P-5'-P-CCNC: 16 U/L (ref 0–45)
ANION GAP SERPL CALCULATED.3IONS-SCNC: 8 MMOL/L (ref 5–18)
APPEARANCE UR: ABNORMAL
AST SERPL W P-5'-P-CCNC: 17 U/L (ref 0–40)
BASOPHILS # BLD AUTO: 0 10E3/UL (ref 0–0.2)
BASOPHILS NFR BLD AUTO: 0 %
BILIRUB DIRECT SERPL-MCNC: 0.1 MG/DL
BILIRUB SERPL-MCNC: 0.2 MG/DL (ref 0–1)
BILIRUB UR QL STRIP: NEGATIVE
BUN SERPL-MCNC: 6 MG/DL (ref 8–22)
CALCIUM SERPL-MCNC: 8.8 MG/DL (ref 8.5–10.5)
CHLORIDE BLD-SCNC: 103 MMOL/L (ref 98–107)
CO2 SERPL-SCNC: 24 MMOL/L (ref 22–31)
COLOR UR AUTO: YELLOW
CREAT SERPL-MCNC: 0.57 MG/DL (ref 0.6–1.1)
EOSINOPHIL # BLD AUTO: 0.1 10E3/UL (ref 0–0.7)
EOSINOPHIL NFR BLD AUTO: 1 %
ERYTHROCYTE [DISTWIDTH] IN BLOOD BY AUTOMATED COUNT: 14.6 % (ref 10–15)
GFR SERPL CREATININE-BSD FRML MDRD: >90 ML/MIN/1.73M2
GLUCOSE BLD-MCNC: 111 MG/DL (ref 70–125)
GLUCOSE UR STRIP-MCNC: 70 MG/DL
HCG SERPL-ACNC: ABNORMAL MLU/ML (ref 0–4)
HCT VFR BLD AUTO: 34.8 % (ref 35–47)
HGB BLD-MCNC: 11.6 G/DL (ref 11.7–15.7)
HGB UR QL STRIP: NEGATIVE
IMM GRANULOCYTES # BLD: 0 10E3/UL
IMM GRANULOCYTES NFR BLD: 0 %
KETONES UR STRIP-MCNC: ABNORMAL MG/DL
LEUKOCYTE ESTERASE UR QL STRIP: ABNORMAL
LIPASE SERPL-CCNC: 21 U/L (ref 0–52)
LYMPHOCYTES # BLD AUTO: 2 10E3/UL (ref 0.8–5.3)
LYMPHOCYTES NFR BLD AUTO: 21 %
MCH RBC QN AUTO: 27.3 PG (ref 26.5–33)
MCHC RBC AUTO-ENTMCNC: 33.3 G/DL (ref 31.5–36.5)
MCV RBC AUTO: 82 FL (ref 78–100)
MONOCYTES # BLD AUTO: 0.5 10E3/UL (ref 0–1.3)
MONOCYTES NFR BLD AUTO: 5 %
MUCOUS THREADS #/AREA URNS LPF: PRESENT /LPF
NEUTROPHILS # BLD AUTO: 6.8 10E3/UL (ref 1.6–8.3)
NEUTROPHILS NFR BLD AUTO: 73 %
NITRATE UR QL: NEGATIVE
NRBC # BLD AUTO: 0 10E3/UL
NRBC BLD AUTO-RTO: 0 /100
PH UR STRIP: 7 [PH] (ref 5–7)
PLATELET # BLD AUTO: 246 10E3/UL (ref 150–450)
POTASSIUM BLD-SCNC: 3.3 MMOL/L (ref 3.5–5)
PROT SERPL-MCNC: 7.2 G/DL (ref 6–8)
RBC # BLD AUTO: 4.25 10E6/UL (ref 3.8–5.2)
RBC URINE: 13 /HPF
SODIUM SERPL-SCNC: 135 MMOL/L (ref 136–145)
SP GR UR STRIP: 1.03 (ref 1–1.03)
SQUAMOUS EPITHELIAL: 96 /HPF
UROBILINOGEN UR STRIP-MCNC: <2 MG/DL
WBC # BLD AUTO: 9.4 10E3/UL (ref 4–11)
WBC URINE: 106 /HPF

## 2021-10-05 PROCEDURE — 76815 OB US LIMITED FETUS(S): CPT

## 2021-10-05 PROCEDURE — 80053 COMPREHEN METABOLIC PANEL: CPT | Performed by: PHYSICIAN ASSISTANT

## 2021-10-05 PROCEDURE — 36415 COLL VENOUS BLD VENIPUNCTURE: CPT | Performed by: PHYSICIAN ASSISTANT

## 2021-10-05 PROCEDURE — 99285 EMERGENCY DEPT VISIT HI MDM: CPT | Mod: 25

## 2021-10-05 PROCEDURE — 87086 URINE CULTURE/COLONY COUNT: CPT | Performed by: PHYSICIAN ASSISTANT

## 2021-10-05 PROCEDURE — 82248 BILIRUBIN DIRECT: CPT | Performed by: PHYSICIAN ASSISTANT

## 2021-10-05 PROCEDURE — 96375 TX/PRO/DX INJ NEW DRUG ADDON: CPT

## 2021-10-05 PROCEDURE — 85025 COMPLETE CBC W/AUTO DIFF WBC: CPT | Performed by: PHYSICIAN ASSISTANT

## 2021-10-05 PROCEDURE — 83690 ASSAY OF LIPASE: CPT | Performed by: PHYSICIAN ASSISTANT

## 2021-10-05 PROCEDURE — 96366 THER/PROPH/DIAG IV INF ADDON: CPT

## 2021-10-05 PROCEDURE — 84702 CHORIONIC GONADOTROPIN TEST: CPT | Performed by: PHYSICIAN ASSISTANT

## 2021-10-05 PROCEDURE — 81001 URINALYSIS AUTO W/SCOPE: CPT | Performed by: PHYSICIAN ASSISTANT

## 2021-10-05 PROCEDURE — 250N000011 HC RX IP 250 OP 636: Performed by: EMERGENCY MEDICINE

## 2021-10-05 PROCEDURE — 258N000003 HC RX IP 258 OP 636: Performed by: EMERGENCY MEDICINE

## 2021-10-05 PROCEDURE — 96365 THER/PROPH/DIAG IV INF INIT: CPT

## 2021-10-05 RX ORDER — CEFTRIAXONE 1 G/1
1 INJECTION, POWDER, FOR SOLUTION INTRAMUSCULAR; INTRAVENOUS ONCE
Status: COMPLETED | OUTPATIENT
Start: 2021-10-05 | End: 2021-10-05

## 2021-10-05 RX ORDER — CEPHALEXIN 500 MG/1
500 CAPSULE ORAL 2 TIMES DAILY
Qty: 12 CAPSULE | Refills: 0 | Status: SHIPPED | OUTPATIENT
Start: 2021-10-06 | End: 2021-10-12

## 2021-10-05 RX ORDER — ONDANSETRON 4 MG/1
4 TABLET, ORALLY DISINTEGRATING ORAL EVERY 8 HOURS PRN
Qty: 20 TABLET | Refills: 0 | Status: SHIPPED | OUTPATIENT
Start: 2021-10-05 | End: 2021-10-12

## 2021-10-05 RX ORDER — ONDANSETRON 2 MG/ML
4 INJECTION INTRAMUSCULAR; INTRAVENOUS ONCE
Status: COMPLETED | OUTPATIENT
Start: 2021-10-05 | End: 2021-10-05

## 2021-10-05 RX ADMIN — SODIUM CHLORIDE 1000 ML: 9 INJECTION, SOLUTION INTRAVENOUS at 21:20

## 2021-10-05 RX ADMIN — ONDANSETRON 4 MG: 2 INJECTION INTRAMUSCULAR; INTRAVENOUS at 21:18

## 2021-10-05 RX ADMIN — CEFTRIAXONE 1 G: 1 INJECTION, POWDER, FOR SOLUTION INTRAMUSCULAR; INTRAVENOUS at 21:21

## 2021-10-05 ASSESSMENT — ENCOUNTER SYMPTOMS
COUGH: 0
FATIGUE: 1
DYSURIA: 0
VOMITING: 1
ABDOMINAL PAIN: 1
LIGHT-HEADEDNESS: 1
CHILLS: 0
FEVER: 0
SHORTNESS OF BREATH: 0
HEADACHES: 0
NAUSEA: 1
WEAKNESS: 0

## 2021-10-05 NOTE — Clinical Note
Vernell Christensen was seen and treated in our emergency department on 10/5/2021.  She may return to work on 10/07/2021.       If you have any questions or concerns, please don't hesitate to call.      Charity Groves, DO

## 2021-10-05 NOTE — ED TRIAGE NOTES
The patient presents to the ED with abdominal discomfort and nausea and vomiting that began today. The patient reports she is 16 weeks pregnant. She reports no appetite. The patient denies any bleeding or cramping. She reports she has not had morning sickness in a long time.

## 2021-10-06 LAB — SCANNED LAB RESULT: NORMAL

## 2021-10-06 NOTE — DISCHARGE INSTRUCTIONS
Continue the doxylamine and pyridoxine as prescribed by your doctor  Zofran as needed for nausea  Small sips of liquid every 10-15 minutes, increase amount and frequency as you tolerate  Cephalexin for urine infection.  Next dose due tomorrow  Follow up closely with your OB doctor  Return if any acute worsening of symptoms

## 2021-10-06 NOTE — ED PROVIDER NOTES
EMERGENCY DEPARTMENT ENCOUNTER      NAME: Vernell Christensen  AGE: 26 year old female  YOB: 1995  MRN: [id number]  EVALUATION DATE & TIME: 10/5/2021  8:50 PM    PCP: Susan Bejarano    ED PROVIDER: Charity Groves DO      Chief Complaint   Patient presents with     Abdominal Pain     Nausea         FINAL IMPRESSION:  1. Nausea and vomiting in pregnancy prior to 22 weeks gestation    2. Pregnancy, incidental    3. Acute cystitis without hematuria          ED COURSE & MEDICAL DECISION MAKING:    Pertinent Labs & Imaging studies reviewed. (See chart for details)  8:55 PM I met the patient and performed my initial interview and exam. PPE worn throughout all patient encounters includes: surgical mask, gloves.   10:24 PM I reassessed the patient. Discussed the lab results. We discussed the plan for discharge and the patient is agreeable. Reviewed supportive cares, symptomatic treatment, outpatient follow up, and reasons to return to the Emergency Department. Patient to be discharged by ED RN.      26 year old female presents to the Emergency Department for evaluation of nausea, vomiting, lower abdominal pain in setting of pregnancy.  She is around 16 weeks pregnant.   This is her fourth pregnancy.  She notes nausea and other pregnancies but no vomiting like this.  She has been having lower abdominal pain, located in both sides.  No bleeding.  No concerning discharge.  She denies any dysuria.  Urinalysis is concerning for infection, however also dirty sample.  Given her symptoms, will empirically treat.  There is some blood in her urine, however symptoms not consistent with a kidney stone.  Again denies any vaginal bleeding.  Given ceftriaxone, fluids, Zofran in the ED.  OB ultrasound obtained and this is unremarkable.  Patient felt improved after above therapies.  I see she has been prescribed doxylamine and pyridoxine.  I encouraged her to continue this.  We will add Zofran.  Discussed small sips every 10 to 15  minutes as she is tolerates and increase in amount and frequency as she can.  Will discharge with cephalexin for bacteriuria in the setting of pregnancy.  Pending urine cultures.  Encourage close follow-up with her OB provider.  Return if any acute worsening symptoms, fever, flank pain, inability tolerating by mouth, vaginal bleeding or any other concerns.    At the conclusion of the encounter I discussed the results of all of the tests and the disposition. The questions were answered. The patient or family acknowledged understanding and was agreeable with the care plan.     MEDICATIONS GIVEN IN THE EMERGENCY:  Medications   0.9% sodium chloride BOLUS (0 mLs Intravenous Stopped 10/5/21 2301)   cefTRIAXone (ROCEPHIN) 1 g vial to attach to  mL bag for ADULTS or NS 50 mL bag for PEDS (0 g Intravenous Stopped 10/5/21 2301)   ondansetron (ZOFRAN) injection 4 mg (4 mg Intravenous Given 10/5/21 2118)       NEW PRESCRIPTIONS STARTED AT TODAY'S ER VISIT  New Prescriptions    CEPHALEXIN (KEFLEX) 500 MG CAPSULE    Take 1 capsule (500 mg) by mouth 2 times daily for 6 days    ONDANSETRON (ZOFRAN ODT) 4 MG ODT TAB    Take 1 tablet (4 mg) by mouth every 8 hours as needed for nausea or vomiting          =================================================================    HPI    Patient information was obtained from: Patient    Use of : N/A       Vernell Christensen is a 26 year old female with a pertinent history of currently 16 weeks pregnant, insulin resistance, s/p cholecystectomy, who presents to this ED by private car for evaluation of nausea and vomiting.     Patient who is currently 16 weeks pregnant endorses a sudden onset of vomiting today. She has had nausea during her pregnancy, but not for a while. Patient is unable to tolerate any food or fluids and endorses feeling fatigued and lightheaded. She also reports low abdominal discomfort. She has been seen by OB for this pregnancy, the only complication has been  nausea, which is the same with her 3 previous pregnancies. Her next appointment for her pregnancy is on 11/4 (~1 month). Denies fever, vaginal bleeding, dysuria, or any additional symptoms at this time.     REVIEW OF SYSTEMS   Review of Systems   Constitutional: Positive for fatigue. Negative for chills and fever.        Positive for decreased PO intake.   Respiratory: Negative for cough and shortness of breath.    Cardiovascular: Negative for chest pain.   Gastrointestinal: Positive for abdominal pain, nausea and vomiting.   Genitourinary: Negative for dysuria.   Skin: Negative.    Neurological: Positive for light-headedness. Negative for syncope, weakness and headaches.   All other systems reviewed and are negative.       PAST MEDICAL HISTORY:  Past Medical History:   Diagnosis Date     Diabetes mellitus (H)        PAST SURGICAL HISTORY:  Past Surgical History:   Procedure Laterality Date     LAPAROSCOPIC CHOLECYSTECTOMY N/A 9/4/2020    Procedure: CHOLECYSTECTOMY, LAPAROSCOPIC;  Surgeon: Joao Willett MD;  Location: [facility];  Service: General     NO PAST SURGERIES             CURRENT MEDICATIONS:    [START ON 10/6/2021] cephALEXin (KEFLEX) 500 MG capsule  ondansetron (ZOFRAN ODT) 4 MG ODT tab  doxylamine (UNISOM) 25 MG TABS tablet  Prenatal Vit-Fe Fumarate-FA (PRENATAL MULTIVITAMIN W/IRON) 27-0.8 MG tablet  vitamin B6 (PYRIDOXINE) 50 MG TABS         ALLERGIES:  No Known Allergies    FAMILY HISTORY:  Family History   Problem Relation Age of Onset     Hypertension Maternal Grandmother      Diabetes Maternal Grandmother      Diabetes Maternal Grandfather      Diabetes Paternal Grandmother      Diabetes Maternal Aunt      Diabetes Maternal Aunt        SOCIAL HISTORY:   Social History     Socioeconomic History     Marital status:      Spouse name: None     Number of children: None     Years of education: None     Highest education level: None   Occupational History     None   Tobacco Use      Smoking status: Never Smoker     Smokeless tobacco: Never Used   Substance and Sexual Activity     Alcohol use: No     Drug use: No     Sexual activity: Yes     Partners: Male     Birth control/protection: None   Other Topics Concern     None   Social History Narrative    6/17/20  with 4 children. Arabic speaking     Social Determinants of Health     Financial Resource Strain:      Difficulty of Paying Living Expenses:    Food Insecurity:      Worried About Running Out of Food in the Last Year:      Ran Out of Food in the Last Year:    Transportation Needs:      Lack of Transportation (Medical):      Lack of Transportation (Non-Medical):    Physical Activity:      Days of Exercise per Week:      Minutes of Exercise per Session:    Stress:      Feeling of Stress :    Social Connections:      Frequency of Communication with Friends and Family:      Frequency of Social Gatherings with Friends and Family:      Attends [employer] Services:      Active Member of Clubs or Organizations:      Attends Club or Organization Meetings:      Marital Status:    Intimate Partner Violence:      Fear of Current or Ex-Partner:      Emotionally Abused:      Physically Abused:      Sexually Abused:        VITALS:  /86   Pulse 103   Temp 98.8  F (37.1  C) (Oral)   Resp 16   Wt 73.9 kg (163 lb)   LMP 06/12/2021   SpO2 98%   BMI 27.93 kg/m      PHYSICAL EXAM    Physical Exam  Constitutional:       General: She is not in acute distress.  HENT:      Head: Normocephalic and atraumatic.      Mouth/Throat:      Pharynx: Oropharynx is clear.   Eyes:      Pupils: Pupils are equal, round, and reactive to light.   Cardiovascular:      Rate and Rhythm: Normal rate and regular rhythm.      Pulses: Normal pulses.      Heart sounds: Normal heart sounds.   Pulmonary:      Effort: Pulmonary effort is normal.      Breath sounds: Normal breath sounds.   Abdominal:      General: Abdomen is flat.      Palpations: Abdomen is soft.    Musculoskeletal:         General: Normal range of motion.   Skin:     General: Skin is warm and dry.      Capillary Refill: Capillary refill takes less than 2 seconds.   Neurological:      General: No focal deficit present.      Mental Status: She is alert and oriented to person, place, and time.             LAB:  All pertinent labs reviewed and interpreted.  Labs Ordered and Resulted from Time of ED Arrival Up to the Time of Departure from the ED   BASIC METABOLIC PANEL - Abnormal; Notable for the following components:       Result Value    Sodium 135 (*)     Potassium 3.3 (*)     Urea Nitrogen 6 (*)     Creatinine 0.57 (*)     All other components within normal limits   HEPATIC FUNCTION PANEL - Abnormal; Notable for the following components:    Albumin 3.1 (*)     All other components within normal limits   ROUTINE UA WITH MICROSCOPIC REFLEX TO CULTURE - Abnormal; Notable for the following components:    Appearance Urine Cloudy (*)     Glucose Urine 70  (*)     Ketones Urine Trace (*)     Protein Albumin Urine 50  (*)     Leukocyte Esterase Urine 500 Kadeem/uL (*)     Mucus Urine Present (*)     RBC Urine 13 (*)     WBC Urine 106 (*)     Squamous Epithelials Urine 96 (*)     All other components within normal limits    Narrative:     Urine Culture ordered based on laboratory criteria   HCG QUANTITATIVE PREGNANCY - Abnormal; Notable for the following components:    hCG Quantitative 14,520 (*)     All other components within normal limits   CBC WITH PLATELETS AND DIFFERENTIAL - Abnormal; Notable for the following components:    Hemoglobin 11.6 (*)     Hematocrit 34.8 (*)     All other components within normal limits   LIPASE - Normal   PERIPHERAL IV CATHETER   URINE CULTURE   CBC WITH PLATELETS & DIFFERENTIAL    Narrative:     The following orders were created for panel order CBC with platelets differential.  Procedure                               Abnormality         Status                     ---------                                -----------         ------                     CBC with platelets and d...[[id number]]  Abnormal            Final result                 Please view results for these tests on the individual orders.       RADIOLOGY:  Reviewed all pertinent imaging. Please see official radiology report.  US OB >14 Weeks Limited wo Fetal Measurement   Final Result   IMPRESSION:     1. Single live intrauterine pregnancy.   2. Posterior placenta without previa.   3. Normal amniotic fluid volume.          I, Andria Caro, am serving as a scribe to document services personally performed by Dr. Charity Groves based on my observation and the provider's statements to me. I, Charity Groves, DO attest that Andria Caro is acting in a scribe capacity, has observed my performance of the services and has documented them in accordance with my direction.    Charity Groves DO  Emergency Medicine  [facility] EMERGENCY ROOM  [address] [id number] [phone]  Dept: [phone]     Charity Groves DO  10/05/21 2314

## 2021-10-07 LAB — BACTERIA UR CULT: NORMAL

## 2021-10-15 ENCOUNTER — OFFICE VISIT (OUTPATIENT)
Dept: FAMILY MEDICINE | Facility: CLINIC | Age: 26
End: 2021-10-15
Payer: COMMERCIAL

## 2021-10-15 VITALS
HEART RATE: 106 BPM | SYSTOLIC BLOOD PRESSURE: 117 MMHG | TEMPERATURE: 98.5 F | OXYGEN SATURATION: 97 % | RESPIRATION RATE: 16 BRPM | DIASTOLIC BLOOD PRESSURE: 79 MMHG | BODY MASS INDEX: 28.24 KG/M2 | WEIGHT: 164.8 LBS

## 2021-10-15 DIAGNOSIS — R05.9 COUGH: Primary | ICD-10-CM

## 2021-10-15 DIAGNOSIS — Z3A.17 17 WEEKS GESTATION OF PREGNANCY: ICD-10-CM

## 2021-10-15 DIAGNOSIS — R11.0 NAUSEA: ICD-10-CM

## 2021-10-15 PROCEDURE — 99213 OFFICE O/P EST LOW 20 MIN: CPT | Performed by: PHYSICIAN ASSISTANT

## 2021-10-15 PROCEDURE — U0005 INFEC AGEN DETEC AMPLI PROBE: HCPCS | Performed by: PHYSICIAN ASSISTANT

## 2021-10-15 PROCEDURE — U0003 INFECTIOUS AGENT DETECTION BY NUCLEIC ACID (DNA OR RNA); SEVERE ACUTE RESPIRATORY SYNDROME CORONAVIRUS 2 (SARS-COV-2) (CORONAVIRUS DISEASE [COVID-19]), AMPLIFIED PROBE TECHNIQUE, MAKING USE OF HIGH THROUGHPUT TECHNOLOGIES AS DESCRIBED BY CMS-2020-01-R: HCPCS | Performed by: PHYSICIAN ASSISTANT

## 2021-10-15 RX ORDER — ONDANSETRON 4 MG/1
4 TABLET, ORALLY DISINTEGRATING ORAL EVERY 8 HOURS PRN
Qty: 10 TABLET | Refills: 0 | Status: SHIPPED | OUTPATIENT
Start: 2021-10-15 | End: 2021-12-13

## 2021-10-15 NOTE — LETTER
[facility]  [address] [address]  Phone: [phone]  Fax: [phone]    October 15, 2021        Vernell Christensen  [address] [address]          To whom it may concern:    RE: Vernell Christensen    He/She is excused from work until COVID-19 results return.  If negative, person may return as long as no fevers of 100.4 or higher.  If COVID-19 test is positive, person must wait 10 days from symptom onset (10/14/2021), as well as no fevers and good improvement of symptoms.    Please contact me for questions or concerns.      Sincerely,        Darcy Blanton PA-C

## 2021-10-15 NOTE — PATIENT INSTRUCTIONS
"You were seen today for a cough. This is likely due to a virus and will improve over the next 1-2 weeks on its own.    Symptom management:  - Drink plenty of non-caffeinated fluids  - Avoid smoke exposure  - May use tylenol or ibuprofen for discomfort  - Drink a warm non-caffeinated tea with honey  - Place a warm humidifier in your bedroom at night  - Mello's VaporRub    Reasons to return for re-evaluation:  - Develop a fever 100.4 or higher, current fever worsens, or fever does not improve in 72 hours  - Difficulty breathing or shortness of breath  - Cough continues to worsen including coughing up blood or coughing up thick, colored phlegm  - Unable to tolerate fluids    Otherwise, if symptoms have not improved in 7 days, follow-up with your primary care provider.    Discharge Instructions for COVID-19 Patients  You have--or may have--COVID-19. Please follow the instructions listed below.   If you have a weakened immune system, discuss with your doctor any other actions you need to take.  How can I protect others?  If you have symptoms (fever, cough, body aches or trouble breathing):    Stay home and away from others (self-isolate) until:  ? Your other symptoms have resolved (gotten better). And   ? You've had no fever--and no medicine that reduces fever--for 1 full day (24 hours). And   ? At least 10 days have passed since your symptoms started. (You may need to wait 20 days. Follow the advice of your care team.)  If you don't show symptoms, but testing showed that you have COVID-19:    Stay home and away from others (self-isolate) until at least 10 days have passed since the date of your first positive COVID-19 test.  During this time    Stay in your own room, even for meals. Use your own bathroom if you can.    Stay away from others in your home. No hugging, kissing or shaking hands. No visitors.    Don't go to work, school or anywhere else.    Clean \"high touch\" surfaces often (doorknobs, counters, handles). Use " household cleaning spray or wipes.    You'll find a full list of  on the EPA website: www.epa.gov/pesticide-registration/list-n-disinfectants-use-against-sars-cov-2.    Cover your mouth and nose with a mask or other face covering to avoid spreading germs.    Wash your hands and face often. Use soap and water.    Caregivers in these groups are at risk for severe illness due to COVID-19:  ? People 65 years and older  ? People who live in a nursing home or long-term care facility  ? People with chronic disease (lung, heart, cancer, diabetes, kidney, liver, immunologic)  ? People who have a weakened immune system, including those who:    Are in cancer treatment    Take medicine that weakens the immune system, such as corticosteroids    Had a bone marrow or organ transplant    Have an immune deficiency    Have poorly controlled HIV or AIDS    Are obese (body mass index of 40 or higher)    Smoke regularly    Caregivers should wear gloves while washing dishes, handling laundry and cleaning bedrooms and bathrooms.    Use caution when washing and drying laundry: Don't shake dirty laundry and use the warmest water setting that you can.    For more tips on managing your health at home, go to www.cdc.gov/coronavirus/2019-ncov/downloads/10Things.pdf.  How can I take care of myself at home?  1. Get lots of rest. Drink extra fluids (unless a doctor has told you not to).  2. Take Tylenol (acetaminophen) for fever or pain. If you have liver or kidney problems, ask your family doctor if it's okay to take Tylenol.   Adults can take either:   ? 650 mg (two 325 mg pills) every 4 to 6 hours, or   ? 1,000 mg (two 500 mg pills) every 8 hours as needed.  ? Note: Don't take more than 3,000 mg in one day. Acetaminophen is found in many medicines (both prescribed and over-the-counter medicines). Read all labels to be sure you don't take too much.   For children, check the Tylenol bottle for the right dose. The dose is based on the  child's age or weight.  3. If you have other health problems (like cancer, heart failure, an organ transplant or severe kidney disease): Call your specialty clinic if you don't feel better in the next 2 days.  4. Know when to call 911. Emergency warning signs include:  ? Trouble breathing or shortness of breath  ? Pain or pressure in the chest that doesn't go away  ? Feeling confused like you haven't felt before, or not being able to wake up  ? Bluish-colored lips or face  5. Your doctor may have prescribed a blood thinner medicine. Follow their instructions.  Where can I get more information?    [facility] - About COVID-19:   [email or web address]    CDC - What to Do If You're Sick: www.cdc.gov/coronavirus/2019-ncov/about/steps-when-sick.html    [facility] - Ending Home Isolation: www.cdc.gov/coronavirus/2019-ncov/hcp/disposition-in-home-patients.html    [facility] - Caring for Someone: www.cdc.gov/coronavirus/2019-ncov/if-you-are-sick/care-for-someone.html    [facility] - Interim Guidance for Hospital Discharge to Home: www.health.[facility].[place]/diseases/coronavirus/hcp/hospdischarge.pdf    Below are the COVID-19 hotlines at the [facility] of Health ([facility]). Interpreters are available.  ? For health questions: Call 183-651-3972 or 1-608.474.3891 (7 a.m. to 7 p.m.)  ? For questions about schools and childcare: Call 732-730-9988 or 1-245.225.3902 (7 a.m. to 7 p.m.)    For informational purposes only. Not to replace the advice of your health care provider. Clinically reviewed by Dr. Justin Gibson.   Copyright   2020 [employer]. All rights reserved. VBOX [id number] - REV 01/05/21.

## 2021-10-15 NOTE — PROGRESS NOTES
Assessment & Plan:      Problem List Items Addressed This Visit     None      Visit Diagnoses     Cough    -  Primary    Relevant Orders    Symptomatic COVID-19 Virus (Coronavirus) by PCR Nose    17 weeks gestation of pregnancy        Nausea        Relevant Medications    ondansetron (ZOFRAN-ODT) 4 MG ODT tab        Medical Decision Making  Patient with current 17-week gestation pregnancy presents with acute onset cough and nausea.  Physical exam appears reassuring with no signs of respiratory distress and clear lung auscultation.  Suspect likely viral upper respiratory infection.  There is impresses COVID-19.  Discussed self-isolation and prevention of spreading illness.  Provided note for work.  The nausea is likely related to pregnancy.  Will treat patient with Zofran as needed.  Discussed signs of worsening symptoms and when to follow-up with PCP if no symptom improvement.     Subjective:      Vernell Christensen is a 26 year old female with current 17-week gestation pregnancy, here for evaluation of cough and nausea.  Onset of symptoms was 24 hours ago.  Patient will have coughing spells until she gags in her mouth but has not had any emesis.  She also notes fatigue, sensation of nausea, and sore throat.  She otherwise denies fevers and shortness of breath.  Patient has not been vaccinated for COVID-19.  She requires a note for work.     The following portions of the patient's history were reviewed and updated as appropriate: allergies, current medications, and problem list.     Review of Systems  Pertinent items are noted in HPI.    Allergies  No Known Allergies    Family History   Problem Relation Age of Onset     Hypertension Maternal Grandmother      Diabetes Maternal Grandmother      Diabetes Maternal Grandfather      Diabetes Paternal Grandmother      Diabetes Maternal Aunt      Diabetes Maternal Aunt        Social History     Tobacco Use     Smoking status: Never Smoker     Smokeless tobacco: Never Used    Substance Use Topics     Alcohol use: No        Objective:      /79   Pulse 106   Temp 98.5  F (36.9  C) (Oral)   Resp 16   Wt 74.8 kg (164 lb 12.8 oz)   LMP 06/12/2021   SpO2 97%   BMI 28.24 kg/m    General appearance - alert, well appearing, and in no distress and non-toxic  Ears - bilateral TM's and external ear canals normal  Nose - normal and patent, no erythema, discharge or polyps  Mouth - mucous membranes moist, pharynx normal without lesions  Neck - supple, no significant adenopathy  Chest - clear to auscultation, no wheezes, rales or rhonchi, symmetric air entry  Heart - normal rate, regular rhythm, normal S1, S2, no murmurs, rubs, clicks or gallops    The use of Dragon/Cinedigm dictation services was used to construct the content of this note; any grammatical errors are non-intentional. Please contact the author directly if you are in need of any clarification.

## 2021-10-16 LAB — SARS-COV-2 RNA RESP QL NAA+PROBE: NEGATIVE

## 2021-10-18 ENCOUNTER — HOSPITAL ENCOUNTER (EMERGENCY)
Facility: CLINIC | Age: 26
Discharge: HOME | End: 2021-10-18
Attending: EMERGENCY MEDICINE | Admitting: EMERGENCY MEDICINE
Payer: COMMERCIAL

## 2021-10-18 ENCOUNTER — NURSE TRIAGE (OUTPATIENT)
Dept: NURSING | Facility: CLINIC | Age: 26
End: 2021-10-18

## 2021-10-18 VITALS
HEIGHT: 63 IN | SYSTOLIC BLOOD PRESSURE: 135 MMHG | RESPIRATION RATE: 21 BRPM | OXYGEN SATURATION: 98 % | HEART RATE: 98 BPM | BODY MASS INDEX: 28.35 KG/M2 | DIASTOLIC BLOOD PRESSURE: 81 MMHG | WEIGHT: 160 LBS | TEMPERATURE: 98.3 F

## 2021-10-18 DIAGNOSIS — J32.9 SINUSITIS, UNSPECIFIED CHRONICITY, UNSPECIFIED LOCATION: ICD-10-CM

## 2021-10-18 PROCEDURE — 87637 SARSCOV2&INF A&B&RSV AMP PRB: CPT | Performed by: EMERGENCY MEDICINE

## 2021-10-18 PROCEDURE — C9803 HOPD COVID-19 SPEC COLLECT: HCPCS

## 2021-10-18 PROCEDURE — 250N000009 HC RX 250: Performed by: EMERGENCY MEDICINE

## 2021-10-18 PROCEDURE — 99283 EMERGENCY DEPT VISIT LOW MDM: CPT

## 2021-10-18 RX ORDER — AMOXICILLIN 500 MG/1
500 CAPSULE ORAL 3 TIMES DAILY
Qty: 21 CAPSULE | Refills: 0 | Status: SHIPPED | OUTPATIENT
Start: 2021-10-18 | End: 2021-10-25

## 2021-10-18 RX ORDER — OXYMETAZOLINE HYDROCHLORIDE 0.05 G/100ML
2 SPRAY NASAL ONCE
Status: COMPLETED | OUTPATIENT
Start: 2021-10-18 | End: 2021-10-18

## 2021-10-18 RX ADMIN — Medication 2 SPRAY: at 20:51

## 2021-10-18 ASSESSMENT — MIFFLIN-ST. JEOR: SCORE: 1439.89

## 2021-10-18 NOTE — TELEPHONE ENCOUNTER
: Magdiel [id number]    Patient calling reporting she has congestion. States she feels warm but not have a thermometer to check her temperature. Reports having difficulty of breathing that is not severe. States she has sinus pain and even her teeth hurt. Per guideline, advised patient to be seen at the emergency department. Caller verbalized understanding. Denies further questions.      Carlton Rodriguez RN  [facility]     COVID 19 Nurse Triage Plan/Patient Instructions    Please be aware that novel coronavirus (COVID-19) may be circulating in the community. If you develop symptoms such as fever, cough, or SOB or if you have concerns about the presence of another infection including coronavirus (COVID-19), please contact your health care provider or visit [email or web address].     Disposition/Instructions    ED Visit recommended. Follow protocol based instructions.     Bring Your Own Device:  Please also bring your smart device(s) (smart phones, tablets, laptops) and their charging cables for your personal use and to communicate with your care team during your visit.    Thank you for taking steps to prevent the spread of this virus.  o Limit your contact with others.  o Wear a simple mask to cover your cough.  o Wash your hands well and often.    Resources    [facility]: About COVID-19: [email or web address]    CDC: What to Do If You're Sick: www.cdc.gov/coronavirus/2019-ncov/about/steps-when-sick.html    CDC: Ending Home Isolation: www.cdc.gov/coronavirus/2019-ncov/hcp/disposition-in-home-patients.html     CDC: Caring for Someone: www.cdc.gov/coronavirus/2019-ncov/if-you-are-sick/care-for-someone.html     [facility]: Interim Guidance for Hospital Discharge to Home: www.health.[facility].mn.us/diseases/coronavirus/hcp/hospdischarge.pdf    [facility] clinical trials (COVID-19 research studies): [email or web address]/umn-clinical-trials     Below are the  COVID-19 hotlines at the [place] Department of Health ([facility]). Interpreters are available.   o For health questions: Call 695-846-7236 or 1-547.214.4523 (7 a.m. to 7 p.m.)  o For questions about schools and childcare: Call 151-461-0205 or 1-241.415.1542 (7 a.m. to 7 p.m.)                       Reason for Disposition    MODERATE difficulty breathing (e.g., speaks in phrases, SOB even at rest, pulse 100-120)    Additional Information    Negative: SEVERE difficulty breathing (e.g., struggling for each breath, speaks in single words)    Negative: Difficult to awaken or acting confused (e.g., disoriented, slurred speech)    Negative: Bluish (or gray) lips or face now    Negative: Shock suspected (e.g., cold/pale/clammy skin, too weak to stand, low BP, rapid pulse)    Negative: Sounds like a life-threatening emergency to the triager    Negative: SEVERE or constant chest pain or pressure (Exception: mild central chest pain, present only when coughing)    Protocols used: CORONAVIRUS (COVID-19) DIAGNOSED OR ASCUBERRV-M-JR 8.25.2021

## 2021-10-19 ENCOUNTER — OFFICE VISIT (OUTPATIENT)
Dept: FAMILY MEDICINE | Facility: CLINIC | Age: 26
End: 2021-10-19
Payer: COMMERCIAL

## 2021-10-19 VITALS
OXYGEN SATURATION: 99 % | HEIGHT: 63 IN | TEMPERATURE: 98.2 F | DIASTOLIC BLOOD PRESSURE: 60 MMHG | BODY MASS INDEX: 29.13 KG/M2 | HEART RATE: 120 BPM | SYSTOLIC BLOOD PRESSURE: 100 MMHG | WEIGHT: 164.4 LBS

## 2021-10-19 DIAGNOSIS — J01.90 ACUTE SINUSITIS, RECURRENCE NOT SPECIFIED, UNSPECIFIED LOCATION: Primary | ICD-10-CM

## 2021-10-19 LAB
FLUAV RNA SPEC QL NAA+PROBE: NEGATIVE
FLUBV RNA RESP QL NAA+PROBE: NEGATIVE
RSV RNA SPEC NAA+PROBE: POSITIVE
SARS-COV-2 RNA RESP QL NAA+PROBE: NEGATIVE

## 2021-10-19 PROCEDURE — 99213 OFFICE O/P EST LOW 20 MIN: CPT | Performed by: FAMILY MEDICINE

## 2021-10-19 ASSESSMENT — MIFFLIN-ST. JEOR: SCORE: 1459.84

## 2021-10-19 NOTE — ED TRIAGE NOTES
Patient c/o nasal congestion for 5 days with sinus pain. Negative covid test yesterday. Complains of loss of smell.

## 2021-10-19 NOTE — ED PROVIDER NOTES
EMERGENCY DEPARTMENT ENCOUNTER      NAME: Vernell Christensen  AGE: 26 year old female  YOB: 1995  MRN: [id number]  EVALUATION DATE & TIME: 10/18/2021  8:01 PM    PCP: Susan Bejarano    ED PROVIDER: Chiki Bhakta D.O.      Chief Complaint   Patient presents with     Nasal Congestion     Sinusitis         HPI    Patient information was obtained from: Patient    Use of : N/A        Vernell Christensen is a 26 year old female who presents for evaluation of sinus pressure and congestion.     Patient presents with complaints of sinus pressure and congestion with a painful sensation over the maxillary sinuses. She also endorses a mild cough with some drainage. Additionally, patient endorses nausea but she is also in her first trimester of pregnancy and has been nauseous for a few weeks because of this.     Patient tested negative for Covid yesterday. She denies fever. No medical problems, but does have a history of cholecystectomy. Denies smoking or drinking.       REVIEW OF SYSTEMS  Constitutional:  Denies fever, chills, weight loss or weakness  Eyes:  No pain, discharge, redness  HENT: Endorses sinus pressure and congestion with painful sensation over the maxillary sinuses. Denies sore throat, ear pain.  Respiratory: Endorses mild cough with some drainage. No SOB or wheeze.  Cardiovascular:  No CP, palpitations  GI: Endorses nausea (secondary to pregnancy). Denies abdominal pain, vomiting, diarrhea  : Denies dysuria, hematuria  Musculoskeletal:  Denies any new muscle/joint pain, swelling or loss of function.  Skin:  Denies rash, pallor  Neurologic:  Denies headache, focal weakness or sensory changes  Lymph: Denies swollen nodes    All other systems negative unless noted in HPI.    PAST MEDICAL HISTORY:  Past Medical History:   Diagnosis Date     Diabetes mellitus (H)        PAST SURGICAL HISTORY:  Past Surgical History:   Procedure Laterality Date     LAPAROSCOPIC CHOLECYSTECTOMY N/A 9/4/2020    Procedure:  CHOLECYSTECTOMY, LAPAROSCOPIC;  Surgeon: Joao Willett MD;  Location: [facility] OR;  Service: General     NO PAST SURGERIES           CURRENT MEDICATIONS:    No current facility-administered medications for this encounter.     Current Outpatient Medications   Medication     amoxicillin (AMOXIL) 500 MG capsule     doxylamine (UNISOM) 25 MG TABS tablet     ondansetron (ZOFRAN-ODT) 4 MG ODT tab     Prenatal Vit-Fe Fumarate-FA (PRENATAL MULTIVITAMIN W/IRON) 27-0.8 MG tablet     vitamin B6 (PYRIDOXINE) 50 MG TABS         ALLERGIES:  No Known Allergies    FAMILY HISTORY:  Family History   Problem Relation Age of Onset     Hypertension Maternal Grandmother      Diabetes Maternal Grandmother      Diabetes Maternal Grandfather      Diabetes Paternal Grandmother      Diabetes Maternal Aunt      Diabetes Maternal Aunt        SOCIAL HISTORY:  Social History     Socioeconomic History     Marital status:      Spouse name: Not on file     Number of children: Not on file     Years of education: Not on file     Highest education level: Not on file   Occupational History     Not on file   Tobacco Use     Smoking status: Never Smoker     Smokeless tobacco: Never Used   Substance and Sexual Activity     Alcohol use: No     Drug use: No     Sexual activity: Yes     Partners: Male     Birth control/protection: None   Other Topics Concern     Not on file   Social History Narrative    6/17/20  with 4 children. [place] speaking     Social Determinants of Health     Financial Resource Strain:      Difficulty of Paying Living Expenses:    Food Insecurity:      Worried About Running Out of Food in the Last Year:      Ran Out of Food in the Last Year:    Transportation Needs:      Lack of Transportation (Medical):      Lack of Transportation (Non-Medical):    Physical Activity:      Days of Exercise per Week:      Minutes of Exercise per Session:    Stress:      Feeling of Stress :    Social Connections:      Frequency  "of Communication with Friends and Family:      Frequency of Social Gatherings with Friends and Family:      Attends Spiritism Services:      Active Member of Clubs or Organizations:      Attends Club or Organization Meetings:      Marital Status:    Intimate Partner Violence:      Fear of Current or Ex-Partner:      Emotionally Abused:      Physically Abused:      Sexually Abused:        VITALS:  Patient Vitals for the past 24 hrs:   BP Temp Temp src Pulse Resp SpO2 Height Weight   10/18/21 1927 135/81 98.3  F (36.8  C) Oral 98 21 98 % 1.6 m (5' 3\") 72.6 kg (160 lb)       PHYSICAL EXAM    VITAL SIGNS: /81   Pulse 98   Temp 98.3  F (36.8  C) (Oral)   Resp 21   Ht 1.6 m (5' 3\")   Wt 72.6 kg (160 lb)   LMP 2021   SpO2 98%   BMI 28.34 kg/m      General Appearance: Well-appearing, well-nourished, no acute distress  Head:  Normocephalic, without obvious abnormality, atraumatic  Eyes:  PERRL, conjunctiva/corneas clear, EOM's intact,  ENT:  Lips, mucosa, and tongue normal, membranes are moist without pallor  Neck:  Normal ROM, symmetrical, trachea midline    Cardio: Borderline tachycardia. Regular rhythm, no murmur, rub or gallop, 2+ pulses symmetric in all extremities  Pulm:  Clear to auscultation bilaterally, respirations unlabored,  Musculoskeletal: Full ROM, no edema, no cyanosis, good ROM of major joints  Integument:  Warm, Dry, No erythema, No rash.    Neurologic:  Alert & oriented.  No focal deficits appreciated.  Ambulatory.  Psychiatric:  Affect normal, Judgment normal, Mood normal.      LABS  No results found for this or any previous visit (from the past 24 hour(s)).      RADIOLOGY  No orders to display        FINAL IMPRESSION:  1. Sinusitis, unspecified chronicity, unspecified location          ED COURSE & MEDICAL DECISION MAKIN:09 PM I met with the patient to gather history and to perform my initial exam. I discussed the plan for care while in the Emergency Department. I saw the " patient while wearing an N95, face shield, and gloves.  9:12 PM We discussed plans for discharge including supportive cares, symptomatic treatment, outpatient follow up, and reasons to return to the emergency department.    Pertinent Labs & Imaging studies reviewed. (See chart for details)  26 year old female presents to the Emergency Department for evaluation of sinus pressure and drainage.  And does report some tenderness over the maxillary sinuses.  Afrin has helped with her congestion.  As she is pregnant, I decided to discharge on Augmentin for suspected sinusitis, that I am concerned could potentially be bacterial in nature based on the duration of the symptoms.  Patient is otherwise stable at this time and appropriate for discharged back home.  She will follow-up as an outpatient with her primary care provider.  Of note a Covid test was ordered today, and it is still pending, and patient was instructed to follow-up online for results.  Return precautions discussed.    At the conclusion of the encounter I discussed the results of all of the tests and the disposition. The questions were answered. The patient or family acknowledged understanding and was agreeable with the care plan.      MEDICATIONS GIVEN IN THE EMERGENCY:  Medications   oxymetazoline (AFRIN) 0.05 % spray 2 spray (2 sprays Both Nostrils Given 10/18/21 2051)       NEW PRESCRIPTIONS STARTED AT TODAY'S ER VISIT  Discharge Medication List as of 10/18/2021  9:21 PM      START taking these medications    Details   amoxicillin (AMOXIL) 500 MG capsule Take 1 capsule (500 mg) by mouth 3 times daily for 7 days, Disp-21 capsule, R-0, Local Print              I, Malena Escobar, am serving as a scribe to document services personally performed by Dr. Livia DO based on my observations and the provider's personal statements to me. I, Dr. Livia DO, attest that Malena Escobar is acting in a scribe capacity, has observed my performance of the service and has documented  them in accordance with my directions.      Chiki Bhakta D.O.  Emergency Medicine  [facility] EMERGENCY ROOM  [facility] [facility] [address] [phone]  Dept: [phone]       Chiki Bhakta,   10/19/21 0004

## 2021-10-19 NOTE — LETTER
October 19, 2021      Vernell Christensen  [address] [place] [id number]        To Whom It May Concern:    Vernell Christensen  was seen on 10/19/2021.  Please excuse her from 10/14/2021 through 10/22/2021 due to illness.      If I can be of further assistance, please do not hesitate to contact my office.        Sincerely,      Deacon Acevedo MD  [facility]

## 2021-10-21 NOTE — PROGRESS NOTES
"  Assessment & Plan     Acute sinusitis, recurrence not specified, unspecified location    Since she is just 1 day after the start of her antibiotics, I am not surprised that she does not feel a whole lot better today as this is a pretty early follow-up in the ED.  I did do a letter for her for work so she can have some time off of that and she will continue with the antibiotic given to her in the ED and continue symptomatic treatment and follow-up if she is not improving.      Review of external notes as documented elsewhere in note         BMI:   Estimated body mass index is 29.12 kg/m  as calculated from the following:    Height as of this encounter: 1.6 m (5' 3\").    Weight as of this encounter: 74.6 kg (164 lb 6.4 oz).           No follow-ups on file.    Deacon Acevedo MD  [facility]    Rosa Matt is a 26 year old who presents for the following health issues     HPI     Patient is here today for follow-up from the ED.  She is seen at one of her other clinics generally.  She went in with some sinus pressure and congestion was diagnosed with a sinus infection and given an antibiotic.  She is not feeling a whole lot better but again she just started this yesterday.  She would like to have a note for work.      Review of Systems   Constitutional, HEENT, cardiovascular, pulmonary, gi and gu systems are negative, except as otherwise noted.      Objective    /60 (BP Location: Left arm, Patient Position: Sitting, Cuff Size: Adult Regular)   Pulse 120   Temp 98.2  F (36.8  C) (Oral)   Ht 1.6 m (5' 3\")   Wt 74.6 kg (164 lb 6.4 oz)   LMP 06/12/2021   SpO2 99%   BMI 29.12 kg/m    Body mass index is 29.12 kg/m .  Physical Exam   GENERAL: healthy, alert and no distress  NECK: no adenopathy, no asymmetry, masses, or scars and thyroid normal to palpation  RESP: lungs clear to auscultation - no rales, rhonchi or wheezes  CV: regular rate and rhythm, normal S1 S2, no S3 or S4, " no murmur, click or rub, no peripheral edema and peripheral pulses strong  ABDOMEN: soft, nontender, no hepatosplenomegaly, no masses and bowel sounds normal  MS: no gross musculoskeletal defects noted, no edema

## 2021-10-22 ENCOUNTER — MYC MEDICAL ADVICE (OUTPATIENT)
Dept: FAMILY MEDICINE | Facility: CLINIC | Age: 26
End: 2021-10-22

## 2021-10-22 NOTE — LETTER
October 25, 2021      Vernell Christensen  [address] [place] [id number]        To Whom It May Concern:    Vernell Christensen was seen in a clinic and ER. She should be out of work from 10/19/21-10/25/21.  She was diagnosed with RSV, and is covid negative.      Sincerely,        Susan Bejarano MD

## 2021-10-22 NOTE — TELEPHONE ENCOUNTER
Patient requesting a note from provider -10/19/21 was provided a note dated through 10/22. When patient presented to employer 10/22 she was sent home with ongoing congestion and cough      Rn routing to provider    Andria Martinez RN on 10/22/2021 at 12:43 PM        Contains abnormal data Symptomatic Influenza A/B & SARS-CoV2 (COVID-19) Virus PCR Multiplex Nasopharyngeal  Order: [id number]  Status:  Final result   Visible to patient:  Yes (MyChart)  Specimen Information: Nasopharyngeal; Swab         0 Result Notes      Ref Range & Units 4 d ago    Influenza A target Negative Negative     Influenza B target Negative Negative     RSV target Negative PositiveAbnormal      SARS CoV2 PCR Negative Negative    Comment: NEGATIVE: SARS-CoV-2 (COVID-19) RNA not detected, presumed negative.

## 2021-10-25 ENCOUNTER — HOSPITAL ENCOUNTER (OUTPATIENT)
Dept: ULTRASOUND IMAGING | Facility: CLINIC | Age: 26
Discharge: HOME | End: 2021-10-25
Attending: FAMILY MEDICINE | Admitting: FAMILY MEDICINE
Payer: COMMERCIAL

## 2021-10-25 DIAGNOSIS — N91.2 AMENORRHEA: ICD-10-CM

## 2021-10-25 DIAGNOSIS — Z33.1 PREGNANCY, INCIDENTAL: ICD-10-CM

## 2021-10-25 PROCEDURE — 76805 OB US >/= 14 WKS SNGL FETUS: CPT

## 2021-10-27 ENCOUNTER — IMMUNIZATION (OUTPATIENT)
Dept: NURSING | Facility: CLINIC | Age: 26
End: 2021-10-27
Payer: COMMERCIAL

## 2021-10-27 PROCEDURE — 0011A PR COVID VAC MODERNA 100 MCG/0.5 ML IM: CPT

## 2021-10-27 PROCEDURE — 91301 PR COVID VAC MODERNA 100 MCG/0.5 ML IM: CPT

## 2021-11-04 ENCOUNTER — PRENATAL OFFICE VISIT (OUTPATIENT)
Dept: FAMILY MEDICINE | Facility: CLINIC | Age: 26
End: 2021-11-04
Payer: COMMERCIAL

## 2021-11-04 VITALS
SYSTOLIC BLOOD PRESSURE: 98 MMHG | TEMPERATURE: 98.5 F | DIASTOLIC BLOOD PRESSURE: 60 MMHG | WEIGHT: 167 LBS | OXYGEN SATURATION: 99 % | BODY MASS INDEX: 29.58 KG/M2 | HEART RATE: 97 BPM

## 2021-11-04 DIAGNOSIS — Z33.1 PREGNANCY, INCIDENTAL: Primary | ICD-10-CM

## 2021-11-04 PROCEDURE — 99207 PR PRENATAL VISIT: CPT | Performed by: FAMILY MEDICINE

## 2021-11-04 NOTE — PROGRESS NOTES
She was in the ER, which hasi mproved a little bit.    She is doing well.  Denies any bleeding, cramping ,or lof.  Baby is moving well.    Denies any headaches or vision changes.    Denies any lower extremity edema.    Denies any ruq pain.  Denies itching. .  Reviewed signs of preeclampisa and when to seek care.

## 2021-11-24 ENCOUNTER — IMMUNIZATION (OUTPATIENT)
Dept: NURSING | Facility: CLINIC | Age: 26
End: 2021-11-24
Attending: PEDIATRICS
Payer: COMMERCIAL

## 2021-11-24 PROCEDURE — 91301 PR COVID VAC MODERNA 100 MCG/0.5 ML IM: CPT

## 2021-11-24 PROCEDURE — 0012A PR COVID VAC MODERNA 100 MCG/0.5 ML IM: CPT

## 2021-12-13 ENCOUNTER — PRENATAL OFFICE VISIT (OUTPATIENT)
Dept: FAMILY MEDICINE | Facility: CLINIC | Age: 26
End: 2021-12-13
Payer: COMMERCIAL

## 2021-12-13 VITALS
DIASTOLIC BLOOD PRESSURE: 60 MMHG | SYSTOLIC BLOOD PRESSURE: 102 MMHG | BODY MASS INDEX: 30.65 KG/M2 | HEART RATE: 104 BPM | RESPIRATION RATE: 14 BRPM | HEIGHT: 63 IN | OXYGEN SATURATION: 98 % | WEIGHT: 173 LBS | TEMPERATURE: 98.5 F

## 2021-12-13 DIAGNOSIS — Z33.1 PREGNANCY, INCIDENTAL: Primary | ICD-10-CM

## 2021-12-13 PROCEDURE — 99207 PR PRENATAL VISIT: CPT | Performed by: FAMILY MEDICINE

## 2021-12-13 ASSESSMENT — MIFFLIN-ST. JEOR: SCORE: 1498.85

## 2021-12-13 NOTE — PROGRESS NOTES
She is doing well.  Denies any bleeding, cramping ,or lof.  Baby is moving well.    Denies any headaches or vision changes.    Denies any lower extremity edema.    Denies any ruq pain.  Denies itching. .  She is no longer working.  She is walking a lot at home.    She would like to deliver at [facility] as this is closer to her house. She is wondering if there is a doctor out there that delivers. Will help to contact her with someone at the [facility].    Needs 1 hour glucose, hemoglobin, and tdap at next visit.

## 2021-12-23 ENCOUNTER — TELEPHONE (OUTPATIENT)
Dept: FAMILY MEDICINE | Facility: CLINIC | Age: 26
End: 2021-12-23
Payer: COMMERCIAL

## 2021-12-23 NOTE — TELEPHONE ENCOUNTER
I'm happy to meet this patient who wants to deliver at , but if I'm to do her delivery, I'd like to take over her prenatal care.  She can see me at either  or [facility], and I'd be willing to use a blocked spot to see her.  She has 1 visit scheduled still with Dr. Bejarano, can either see her once more and then transfer or see me right away.  She might find [facility] more convenient as she is currently being seen at [place].    Please call her and help her get scheduled if this is what she would like.  If she doesn't want to transfer care, let me know and I can message Dr. Bejarano.

## 2021-12-23 NOTE — TELEPHONE ENCOUNTER
----- Message from Susan Bejarano MD sent at 12/16/2021  5:54 PM CST -----  Great, thank you.    She is a monse lady.  She is back with the father of her first 2 girls.    Let me know if I can help you get in touch with her in any way.      Susan   ----- Message -----  From: Nury Rodriguez MD  Sent: 12/16/2021   5:06 PM CST  To: Susan Bejarano MD    Happy to see this patient.  She can have some prenatal visits at [facility] also if this is convenient for her.  (I'm at [facility])  My CA tried to reach out and left her a message, will try back next week if she hasn't called back.  ----- Message -----  From: Rakel Marques MD  Sent: 12/15/2021   9:36 AM CST  To: Susan Bejarano MD, Nury Rodriguez MD    Thank you!  Nury Rodriguez is here 2 days a week and we are trying to share the OB patients. I will send this to her as well!  ----- Message -----  From: Susan Bejarano MD  Sent: 12/13/2021   1:50 PM CST  To: Rakel Marques MD    [facility], this monse patient is looking for a family doc who delivers in [facility] . Are you available to see her and attend her delivery?    Please let me know.    Thank you,  Susan bejarano

## 2021-12-24 NOTE — TELEPHONE ENCOUNTER
Attempted to call pt a couple times with  but have not gotten an answer and  says there is no VM. Do you have a better way of getting ahold of patient?

## 2021-12-27 NOTE — TELEPHONE ENCOUNTER
Thong Davis, it appears my team have been unable to contact the patient.  Perhaps your staff can help her set up a visit with me if she is interested at your next visit.

## 2022-05-03 ENCOUNTER — HOSPITAL ENCOUNTER (EMERGENCY)
Facility: CLINIC | Age: 27
Discharge: HOME | End: 2022-05-04
Attending: EMERGENCY MEDICINE | Admitting: EMERGENCY MEDICINE
Payer: COMMERCIAL

## 2022-05-03 VITALS
BODY MASS INDEX: 30.11 KG/M2 | TEMPERATURE: 99.1 F | DIASTOLIC BLOOD PRESSURE: 65 MMHG | WEIGHT: 170 LBS | RESPIRATION RATE: 16 BRPM | OXYGEN SATURATION: 96 % | HEART RATE: 80 BPM | SYSTOLIC BLOOD PRESSURE: 113 MMHG

## 2022-05-03 DIAGNOSIS — R51.9 ACUTE NONINTRACTABLE HEADACHE, UNSPECIFIED HEADACHE TYPE: ICD-10-CM

## 2022-05-03 PROCEDURE — 99283 EMERGENCY DEPT VISIT LOW MDM: CPT

## 2022-05-03 PROCEDURE — 250N000013 HC RX MED GY IP 250 OP 250 PS 637: Performed by: EMERGENCY MEDICINE

## 2022-05-03 RX ORDER — ACETAMINOPHEN 325 MG/1
975 TABLET ORAL ONCE
Status: COMPLETED | OUTPATIENT
Start: 2022-05-03 | End: 2022-05-03

## 2022-05-03 RX ADMIN — ACETAMINOPHEN 975 MG: 325 TABLET ORAL at 22:16

## 2022-05-03 ASSESSMENT — ENCOUNTER SYMPTOMS
NUMBNESS: 0
ABDOMINAL PAIN: 1
SHORTNESS OF BREATH: 0
DIZZINESS: 0
NAUSEA: 0
PHOTOPHOBIA: 0
FATIGUE: 1
LIGHT-HEADEDNESS: 1
BACK PAIN: 0
HEADACHES: 1
VOMITING: 0
CHILLS: 1

## 2022-05-04 NOTE — ED TRIAGE NOTES
pt presents to the ED with c/o worsening HA and fevers since this morning. Pt feeling slight dizzy as well. Denies cough.

## 2022-05-04 NOTE — DISCHARGE INSTRUCTIONS
You were seen in the Emergency Department today for a headache.     You can continue to use tylenol for your headache since that seemed to help here.       Please return to the ER if you experience pain not better with tylenol, inability to keep fluids down, fever, and/or for any other new or concerning symptoms, otherwise please follow up with your primary doctor for recheck.     Below is some information you might find useful.     Thank you for choosing [facility]. It was a pleasure taking care of you today!  - Dr. Zoe Verde

## 2022-05-04 NOTE — ED PROVIDER NOTES
EMERGENCY DEPARTMENT ENCOUNTER      NAME: Vernell Christensen  YOB: 1995  MRN: [id number]      FINAL IMPRESSION  1. Acute nonintractable headache, unspecified headache type        MEDICAL DECISION MAKING   Pertinent Labs & Imaging studies reviewed. (See chart for details)    Vernell Christensen is a 26 year old female who presents for evaluation of a headache.  Patient reports onset of headache last week with a subjective fever and chills.  This subsequently resolved but then recurred at this evening and seemed more severe.  Fortunately, the chills have not returned.  She did have an episode of abdominal pain last week but this is also resolved.  Her only other new complaint is of mild lightheadedness.  She has no other new complaints.  She did not try taking any medications for her pain prior to coming in. Patient does not experience headaches frequently.. Vitals on arrival stable and reassuring. Remainder of history and exam, as below.     I considered a broad differential diagnosis including, but not limited to: migraine, tension headache, cluster headache, sinusitis, temporal arteritis. No falls to suggest traumatic epidural/subdural hematoma. Patient has a non-focal neuro exam so have low suspicion for intracranial process such as CVA, SAH, SDH. There are no fever or infections symptoms to suggest meningitis or encephalitis. The patient also denies vision change or ocular pain and has no exam findings to suggest acute angle-closure glaucoma. There is no temporal tenderness, vision change to suggest temporal arteritis and no concerning findings on history or exam to suggest tumor/mass. Given history, reassuring exam, and in the absence of s/s suggestive of concerning intracranial pathology, I do feel that etiology is most likely primary/benign headache. I see no indications for lab/imaging workup on emergent basis and patient agrees.     Discussed options for symptom management with patient.  She is tolerating  p.o. without difficulty and appears well-hydrated still feels comfortable trying oral medications.  We will start with Tylenol given she is breast-feeding.    Patient had resolution of headache after a single dose of Tylenol.  She has remained hemodynamically stable, neurologically intact, and tolerating p.o. without difficulty and was eager to go home today.    The importance of close follow up was discussed. We reviewed warning signs and symptoms, and I instructed Ms. Christensen to return to the emergency department immediately if she develops any new or worsening symptoms. I provided additional verbal discharge instructions. Ms. Christensen expressed understanding and agreement with this plan of care, her questions were answered, and she was discharged in stable condition.         ED COURSE  10:00 PM I met with the patient, obtained history, performed an initial exam, and discussed options and plan for diagnostics and treatment here in the ED.  11:50 PM Per nursing report, patient's headache has resolved.    MEDICATIONS GIVEN IN THE ED  Medications - No data to display    NEW PRESCRIPTIONS STARTED AT TODAY'S VISIT  New Prescriptions    No medications on file          =================================================================    Chief Complaint   Patient presents with     Headache     Fever         HPI:    Patient information was obtained from: Patient    Use of : N/A     Vernell Christensen is a 26 year old female with a history of class 2 obesity who presents to the Ed via walk-in for the evaluation of headache.    Patient reports a headache that started last week with associating chills, lightheadedness, lower abdominal pain, and fatigue. She states that her headache is located at bilateral temples and the back of her head. Patient states that symptoms worsened today, which prompted her to be seen in the ED. At present, she denies any abdominal pain. She has not taken any medications for pain. Otherwise, she  denies any numbness, tingling, vision changes, dizziness, nausea, vomiting, chest pain, shortness of breath, photophobia, back pain, recent sick contact, and recent falls or head injuries. No previous history of headaches. Patient is breastfeeding at this time.      RELEVANT HISTORY, MEDICATIONS, & ALLERGIES   Past medical history, surgical history, family history, medications, and allergies reviewed and pertinent noted in HPI. See end of note for comprehensive list.    REVIEW OF SYSTEMS:  Review of Systems   Constitutional: Positive for chills and fatigue.   Eyes: Negative for photophobia.        Negative for vision changes   Respiratory: Negative for shortness of breath.    Cardiovascular: Negative for chest pain.   Gastrointestinal: Positive for abdominal pain (lower). Negative for nausea and vomiting.   Musculoskeletal: Negative for back pain.   Neurological: Positive for light-headedness and headaches. Negative for dizziness and numbness.        Negative for tingling   All other systems reviewed and are negative.    PHYSICAL EXAM:    Vitals: /82   Pulse 104   Temp 99.1  F (37.3  C) (Oral)   Resp 16   Wt 77.1 kg (170 lb)   LMP 06/12/2021   SpO2 98%   BMI 30.11 kg/m     General: Alert and interactive, comfortable appearing.  HENT: No external signs of trauma.  No temporal tenderness to palpation.  No tenderness to palpation of scalp or facial bones.  Oropharynx without erythema or exudates. MMM.   Eyes: Pupils mid-sized and equally reactive.   Neck: Full AROM.  No midline tenderness.  No nuchal rigidity.  IUD I did  Cardiovascular: Regular rate and rhythm. Peripheral pulses 2+ bilaterally.  Chest/Pulmonary: Normal work of breathing. Lung sounds clear and equal throughout, no wheezes or crackles.   Abdomen: Soft, nondistended. Nontender without guarding or rebound.  Back/Spine: No CVA or midline tenderness.  Extremities: Normal ROM of all major joints. No lower extremity edema.   Skin: Warm and dry.  Normal skin color.   Neuro: Speech clear. CNs grossly intact. Moves all extremities appropriately. Strength and sensation grossly intact to all extremities.   Psych: Normal affect/mood, cooperative, memory appropriate.       Comprehensive outline of EPIC chart Hx  PAST MEDICAL HISTORY    Past Medical History:   Diagnosis Date     Diabetes mellitus (H)      Past Surgical History:   Procedure Laterality Date     LAPAROSCOPIC CHOLECYSTECTOMY N/A 9/4/2020    Procedure: CHOLECYSTECTOMY, LAPAROSCOPIC;  Surgeon: Joao Willett MD;  Location: [facility] OR;  Service: General     NO PAST SURGERIES         CURRENT MEDICATIONS    Current Outpatient Medications   Medication Instructions     Prenatal Vit-Fe Fumarate-FA (PRENATAL MULTIVITAMIN W/IRON) 27-0.8 MG tablet 1 tablet, Oral, DAILY     vitamin B6 (PYRIDOXINE) 100 mg, Oral, 3 TIMES DAILY BEFORE MEALS       ALLERGIES    No Known Allergies    FAMILY HISTORY    Family History   Problem Relation Age of Onset     Hypertension Maternal Grandmother      Diabetes Maternal Grandmother      Diabetes Maternal Grandfather      Diabetes Paternal Grandmother      Diabetes Maternal Aunt      Diabetes Maternal Aunt        SOCIAL HISTORY    Social History     Socioeconomic History     Marital status:    Tobacco Use     Smoking status: Never Smoker     Smokeless tobacco: Never Used   Substance and Sexual Activity     Alcohol use: No     Drug use: No     Sexual activity: Yes     Partners: Male     Birth control/protection: None   Social History Narrative    6/17/20  with 4 children. [place] speaking       I, Chayito Nelson, am serving as a scribe to document services personally performed by Dr. Zoe Verde based on my observation and the provider's statements to me. I, Zoe Verde MD attest that Chayito Nelson is acting in a scribe capacity, has observed my performance of the services and has documented them in accordance with my direction.    Zoe Verde M.D.  Emergency  [facility] EMERGENCY ROOM  [address] [facility] [address]  [phone]  Dept: [phone]     Zoe Verde MD  05/04/22 0000

## 2022-05-05 ENCOUNTER — TELEPHONE (OUTPATIENT)
Dept: FAMILY MEDICINE | Facility: CLINIC | Age: 27
End: 2022-05-05
Payer: COMMERCIAL

## 2022-05-05 NOTE — TELEPHONE ENCOUNTER
Please reach out to pt and see if she has been getting prenatal care anywhere?  If not, please help her to set up a visit .     Thank you

## 2022-05-05 NOTE — TELEPHONE ENCOUNTER
Pt called back, she delivered 02/23/22 in [place]. She was in [place] 3 months. She is asking about the post partum appt, but it has been more than 8 weeks since she delivered. TC was going to schedule her for a Pelvic exam also with birth control, but PCP does not have any openings until July. Okay to see sooner?

## 2022-05-07 ENCOUNTER — HOSPITAL ENCOUNTER (EMERGENCY)
Facility: CLINIC | Age: 27
Discharge: HOME | End: 2022-05-08
Attending: EMERGENCY MEDICINE | Admitting: EMERGENCY MEDICINE
Payer: COMMERCIAL

## 2022-05-07 ENCOUNTER — APPOINTMENT (OUTPATIENT)
Dept: CT IMAGING | Facility: CLINIC | Age: 27
End: 2022-05-07
Attending: EMERGENCY MEDICINE
Payer: COMMERCIAL

## 2022-05-07 ENCOUNTER — APPOINTMENT (OUTPATIENT)
Dept: RADIOLOGY | Facility: CLINIC | Age: 27
End: 2022-05-07
Attending: EMERGENCY MEDICINE
Payer: COMMERCIAL

## 2022-05-07 VITALS
TEMPERATURE: 99.7 F | SYSTOLIC BLOOD PRESSURE: 118 MMHG | RESPIRATION RATE: 18 BRPM | WEIGHT: 170 LBS | BODY MASS INDEX: 30.11 KG/M2 | HEART RATE: 108 BPM | DIASTOLIC BLOOD PRESSURE: 70 MMHG | OXYGEN SATURATION: 99 %

## 2022-05-07 DIAGNOSIS — N39.0 URINARY TRACT INFECTION WITHOUT HEMATURIA, SITE UNSPECIFIED: ICD-10-CM

## 2022-05-07 DIAGNOSIS — R51.9 NONINTRACTABLE HEADACHE, UNSPECIFIED CHRONICITY PATTERN, UNSPECIFIED HEADACHE TYPE: ICD-10-CM

## 2022-05-07 LAB
ANION GAP SERPL CALCULATED.3IONS-SCNC: 14 MMOL/L (ref 5–18)
BASOPHILS # BLD AUTO: 0.1 10E3/UL (ref 0–0.2)
BASOPHILS NFR BLD AUTO: 0 %
BUN SERPL-MCNC: 11 MG/DL (ref 8–22)
CALCIUM SERPL-MCNC: 9.2 MG/DL (ref 8.5–10.5)
CHLORIDE BLD-SCNC: 105 MMOL/L (ref 98–107)
CO2 SERPL-SCNC: 22 MMOL/L (ref 22–31)
CREAT SERPL-MCNC: 0.65 MG/DL (ref 0.6–1.1)
EOSINOPHIL # BLD AUTO: 0.1 10E3/UL (ref 0–0.7)
EOSINOPHIL NFR BLD AUTO: 1 %
ERYTHROCYTE [DISTWIDTH] IN BLOOD BY AUTOMATED COUNT: 17.2 % (ref 10–15)
FLUAV RNA SPEC QL NAA+PROBE: NEGATIVE
FLUBV RNA RESP QL NAA+PROBE: NEGATIVE
GFR SERPL CREATININE-BSD FRML MDRD: >90 ML/MIN/1.73M2
GLUCOSE BLD-MCNC: 103 MG/DL (ref 70–125)
HCT VFR BLD AUTO: 35.5 % (ref 35–47)
HGB BLD-MCNC: 11.1 G/DL (ref 11.7–15.7)
IMM GRANULOCYTES # BLD: 0.1 10E3/UL
IMM GRANULOCYTES NFR BLD: 0 %
LYMPHOCYTES # BLD AUTO: 1.9 10E3/UL (ref 0.8–5.3)
LYMPHOCYTES NFR BLD AUTO: 16 %
MCH RBC QN AUTO: 24.1 PG (ref 26.5–33)
MCHC RBC AUTO-ENTMCNC: 31.3 G/DL (ref 31.5–36.5)
MCV RBC AUTO: 77 FL (ref 78–100)
MONOCYTES # BLD AUTO: 0.8 10E3/UL (ref 0–1.3)
MONOCYTES NFR BLD AUTO: 6 %
NEUTROPHILS # BLD AUTO: 9.3 10E3/UL (ref 1.6–8.3)
NEUTROPHILS NFR BLD AUTO: 77 %
NRBC # BLD AUTO: 0 10E3/UL
NRBC BLD AUTO-RTO: 0 /100
PLATELET # BLD AUTO: 329 10E3/UL (ref 150–450)
POTASSIUM BLD-SCNC: 3.6 MMOL/L (ref 3.5–5)
RBC # BLD AUTO: 4.6 10E6/UL (ref 3.8–5.2)
SARS-COV-2 RNA RESP QL NAA+PROBE: NEGATIVE
SODIUM SERPL-SCNC: 141 MMOL/L (ref 136–145)
WBC # BLD AUTO: 12.2 10E3/UL (ref 4–11)

## 2022-05-07 PROCEDURE — 96374 THER/PROPH/DIAG INJ IV PUSH: CPT | Performed by: EMERGENCY MEDICINE

## 2022-05-07 PROCEDURE — 99285 EMERGENCY DEPT VISIT HI MDM: CPT | Mod: 25 | Performed by: EMERGENCY MEDICINE

## 2022-05-07 PROCEDURE — 36415 COLL VENOUS BLD VENIPUNCTURE: CPT | Performed by: EMERGENCY MEDICINE

## 2022-05-07 PROCEDURE — 81003 URINALYSIS AUTO W/O SCOPE: CPT | Performed by: EMERGENCY MEDICINE

## 2022-05-07 PROCEDURE — 250N000011 HC RX IP 250 OP 636: Performed by: EMERGENCY MEDICINE

## 2022-05-07 PROCEDURE — 96361 HYDRATE IV INFUSION ADD-ON: CPT | Performed by: EMERGENCY MEDICINE

## 2022-05-07 PROCEDURE — 96375 TX/PRO/DX INJ NEW DRUG ADDON: CPT | Performed by: EMERGENCY MEDICINE

## 2022-05-07 PROCEDURE — 70450 CT HEAD/BRAIN W/O DYE: CPT

## 2022-05-07 PROCEDURE — 87086 URINE CULTURE/COLONY COUNT: CPT | Performed by: EMERGENCY MEDICINE

## 2022-05-07 PROCEDURE — 85025 COMPLETE CBC W/AUTO DIFF WBC: CPT | Performed by: EMERGENCY MEDICINE

## 2022-05-07 PROCEDURE — C9803 HOPD COVID-19 SPEC COLLECT: HCPCS | Performed by: EMERGENCY MEDICINE

## 2022-05-07 PROCEDURE — 71046 X-RAY EXAM CHEST 2 VIEWS: CPT

## 2022-05-07 PROCEDURE — 87636 SARSCOV2 & INF A&B AMP PRB: CPT | Performed by: EMERGENCY MEDICINE

## 2022-05-07 PROCEDURE — 80048 BASIC METABOLIC PNL TOTAL CA: CPT | Performed by: EMERGENCY MEDICINE

## 2022-05-07 PROCEDURE — 258N000003 HC RX IP 258 OP 636: Performed by: EMERGENCY MEDICINE

## 2022-05-07 RX ORDER — ONDANSETRON 2 MG/ML
4 INJECTION INTRAMUSCULAR; INTRAVENOUS ONCE
Status: COMPLETED | OUTPATIENT
Start: 2022-05-07 | End: 2022-05-07

## 2022-05-07 RX ORDER — MORPHINE SULFATE 4 MG/ML
4 INJECTION, SOLUTION INTRAMUSCULAR; INTRAVENOUS EVERY 30 MIN PRN
Status: DISCONTINUED | OUTPATIENT
Start: 2022-05-07 | End: 2022-05-08 | Stop reason: HOSPADM

## 2022-05-07 RX ADMIN — ONDANSETRON 4 MG: 2 INJECTION INTRAMUSCULAR; INTRAVENOUS at 21:44

## 2022-05-07 RX ADMIN — SODIUM CHLORIDE 1000 ML: 9 INJECTION, SOLUTION INTRAVENOUS at 21:43

## 2022-05-07 RX ADMIN — MORPHINE SULFATE 4 MG: 4 INJECTION, SOLUTION INTRAMUSCULAR; INTRAVENOUS at 21:44

## 2022-05-07 ASSESSMENT — ENCOUNTER SYMPTOMS
FEVER: 1
COUGH: 0
ABDOMINAL PAIN: 0
DYSURIA: 0
HEADACHES: 1

## 2022-05-08 LAB
ALBUMIN UR-MCNC: NEGATIVE MG/DL
APPEARANCE UR: ABNORMAL
BILIRUB UR QL STRIP: NEGATIVE
COLOR UR AUTO: ABNORMAL
GLUCOSE UR STRIP-MCNC: NEGATIVE MG/DL
HGB UR QL STRIP: NEGATIVE
KETONES UR STRIP-MCNC: NEGATIVE MG/DL
LEUKOCYTE ESTERASE UR QL STRIP: ABNORMAL
MUCOUS THREADS #/AREA URNS LPF: PRESENT /LPF
NITRATE UR QL: NEGATIVE
PH UR STRIP: 6.5 [PH] (ref 5–7)
RBC URINE: 0 /HPF
SP GR UR STRIP: 1.03 (ref 1–1.03)
SQUAMOUS EPITHELIAL: 23 /HPF
UROBILINOGEN UR STRIP-MCNC: <2 MG/DL
WBC URINE: 11 /HPF

## 2022-05-08 PROCEDURE — 250N000013 HC RX MED GY IP 250 OP 250 PS 637: Performed by: EMERGENCY MEDICINE

## 2022-05-08 RX ORDER — CEPHALEXIN 500 MG/1
500 CAPSULE ORAL ONCE
Status: COMPLETED | OUTPATIENT
Start: 2022-05-08 | End: 2022-05-08

## 2022-05-08 RX ORDER — CEPHALEXIN 500 MG/1
500 CAPSULE ORAL 4 TIMES DAILY
Qty: 28 CAPSULE | Refills: 0 | Status: SHIPPED | OUTPATIENT
Start: 2022-05-08 | End: 2022-05-15

## 2022-05-08 RX ADMIN — CEPHALEXIN 500 MG: 500 CAPSULE ORAL at 00:19

## 2022-05-08 NOTE — DISCHARGE INSTRUCTIONS
You MAY have a urinary tract infection. We are starting prescription antibiotic medicine in case the urine infection is causing your symptoms so please take the antibiotic medicine and talk to your doctor tomorrow because your doctor can help you to follow up the results of your URINE CULTURE, a test that we are doing at [facility] to see what kind of bacteria you may have in your urine. If you feel ANY worsening symptoms then please come back to the ER so we can make sure we are able to help you.

## 2022-05-08 NOTE — ED PROVIDER NOTES
EMERGENCY DEPARTMENT SIGN OUT NOTE        ED COURSE AND MEDICAL DECISION MAKING  Patient was signed out to me by Dr Juan Daigle at 10:00 PM.  11:26 PM I met, rechecked, and updated patient on results.    In brief, Vernell Christensen is a 26 year old female who initially presented for evaluation of a fever. Patient presents to the ED today for a fever of 101 measured at home and reported worsening headache. She denies any additional symptoms. Patient denies cough, dysuria, rash, and abdominal pain.    At time of sign out, disposition was pending CT Head an d XR Chest.    ED Course as of 05/08/22 0010   Sat May 07, 2022   9774 Pt signed out to me by daytime ED MD Dr KELLY with reported home fever but reassuringly no fever today and no antipyretic therapy prior to ED presentation and some slight HA without AMS or meningismus, hemoglobin at baseline, no rash on examination, BMP WNL, covid19 and influenza negative, pending UA and CXR and CT head and clinical reassessment. Pt initially anxious with  per report by daytime MD and improved HR after ED stay and IVF just now begun with HR improved to 104 before IVF. Pt seen for same 5/3 with negative ED workup and neurovascularly intact on both ED visits, will reassess shortly, pt has not yet f/unit(s) with PMD re: this yet and not in postpartum phase because she did have a child 3 months ago in [place] and now beyond 8 week postpartum period.   2326 CXR and CT head reassuringly negative and with no recorded fever, no specific change over the week and no AMS or meningismus, unlikely acute meningoencephalitis. Pt clinically reassessed by me and ambulating without difficulty, moving neck freely, carrying baby without difficulty, walks to bathroom and back without difficulty to give UA sample. Pt wihtout confusion, does note she feels much improved. COVID19 negative, bMP WNL, hemoglobin at her baseline, WBC 12.2 which is fairly normal range, will check UA and reassess. As she does  appear well overall and neuro intact with ongoing headaches and imaging reassuring and does have a close primary care physician that she is able to follow up with I feel that she appears medicallys table at this time to closely follow up with primary physician tomorrow since pain in the ED is improved and she does appear so well at this time so she can undergo close clinical reassessment to ensure her safety and therefore importance of close follow up tomorrow was emphasized in my discussion with her and she communicates understanding and agreement at this time.    Sun May 08, 2022   0005 UA with many squames and 11 WBC with no nitrites and no RBC, possible UTI early/brewing thus begun on keflex as she is breastfeeding at this time and first dose given in ED and urine cutlure pending . Pt without vaginal discharge or abdominal pain thus unlikely delivery related, VS WNL at this time, paitent ok with plan to f/unit(s) with PMD tomorrow with possible UTI. Patient discharged after being provided with extensive anticipatory guidance and given return precautions, importance of PMD follow-up emphasized.          FINAL IMPRESSION    1. Nonintractable headache, unspecified chronicity pattern, unspecified headache type    2. Urinary tract infection without hematuria, site unspecified        ED MEDS  Medications   morphine (PF) injection 4 mg (4 mg Intravenous Given 5/7/22 2144)   cephALEXin (KEFLEX) capsule 500 mg (has no administration in time range)   0.9% sodium chloride BOLUS (0 mLs Intravenous Stopped 5/7/22 2322)   ondansetron (ZOFRAN) injection 4 mg (4 mg Intravenous Given 5/7/22 2144)       LAB  Labs Ordered and Resulted from Time of ED Arrival to Time of ED Departure   ROUTINE UA WITH MICROSCOPIC REFLEX TO CULTURE - Abnormal       Result Value    Color Urine Light Yellow      Appearance Urine Turbid (*)     Glucose Urine Negative      Bilirubin Urine Negative      Ketones Urine Negative      Specific Gravity Urine  1.029      Blood Urine Negative      pH Urine 6.5      Protein Albumin Urine Negative      Urobilinogen Urine <2.0      Nitrite Urine Negative      Leukocyte Esterase Urine 75 Kadeem/uL (*)     Mucus Urine Present (*)     RBC Urine 0      WBC Urine 11 (*)     Squamous Epithelials Urine 23 (*)    CBC WITH PLATELETS AND DIFFERENTIAL - Abnormal    WBC Count 12.2 (*)     RBC Count 4.60      Hemoglobin 11.1 (*)     Hematocrit 35.5      MCV 77 (*)     MCH 24.1 (*)     MCHC 31.3 (*)     RDW 17.2 (*)     Platelet Count 329      % Neutrophils 77      % Lymphocytes 16      % Monocytes 6      % Eosinophils 1      % Basophils 0      % Immature Granulocytes 0      NRBCs per 100 WBC 0      Absolute Neutrophils 9.3 (*)     Absolute Lymphocytes 1.9      Absolute Monocytes 0.8      Absolute Eosinophils 0.1      Absolute Basophils 0.1      Absolute Immature Granulocytes 0.1      Absolute NRBCs 0.0     INFLUENZA A/B & SARS-COV2 PCR MULTIPLEX - Normal    Influenza A PCR Negative      Influenza B PCR Negative      SARS CoV2 PCR Negative     BASIC METABOLIC PANEL - Normal    Sodium 141      Potassium 3.6      Chloride 105      Carbon Dioxide (CO2) 22      Anion Gap 14      Urea Nitrogen 11      Creatinine 0.65      Calcium 9.2      Glucose 103      GFR Estimate >90     URINE CULTURE       RADIOLOGY    XR Chest 2 Views   Final Result   IMPRESSION:       Heart size is normal. Lungs are clear bilaterally. Mediastinum and visualized bony structures are unremarkable.      CT Head w/o Contrast   Final Result   IMPRESSION:   1.  No definite acute intracranial process.          DISCHARGE MEDS  New Prescriptions    CEPHALEXIN (KEFLEX) 500 MG CAPSULE    Take 1 capsule (500 mg) by mouth 4 times daily for 7 days       I, Chayito Nelson, am serving as a scribe to document services personally performed by Dr. Kyle, based on my observations and the provider's statements to me.  I, Dr. Kyle, attest that Chayito Nelson is acting in a scribe capacity, has  observed my performance of the services and has documented them in accordance with my direction.      [facility] EMERGENCY ROOM  [address] [address] [phone]     Malena Kyle MD  05/08/22 0010

## 2022-05-08 NOTE — ED PROVIDER NOTES
EMERGENCY DEPARTMENT ENCOUNTER      NAME: Vernell Christensen  AGE: 26 year old female  YOB: 1995  MRN: [id number]  EVALUATION DATE & TIME: 2022  8:43 PM    PCP: Susan Bejarano    ED PROVIDER: Juan Daigle M.D.      Chief Complaint   Patient presents with     Fever     Headache       FINAL IMPRESSION:  1.  Fever and headache.      ED COURSE & MEDICAL DECISION MAKIN:05 PM I met with the patient to gather history and to perform my initial exam. We discussed plans for the ED course, including diagnostic testing and treatment. PPE worn: cloth mask patient with low-grade fevers and headache off and on for the last several days.  Seen here May 3.  Symptoms resolved with Tylenol.  No evaluation at that time.  Patient notes continued headache and fever.  She denies any neurological deficits.  Evaluation proceeding.  Patient is breast-feeding.  9:30 PM.  Everything still pending.  Response to treatment still pending.  Tentatively, patient will be signed out to the night ED physician to follow-up on studies with anticipated discharge home.    Pertinent Labs & Imaging studies reviewed. (See chart for details)      26 year old female presents to the Emergency Department for evaluation of headache and fever.    At the conclusion of the encounter I discussed the results of all of the tests and the disposition. The questions were answered. The patient or family acknowledged understanding and was agreeable with the care plan.       0 minutes of critical care time     MEDICATIONS GIVEN IN THE EMERGENCY:  Medications - No data to display    NEW PRESCRIPTIONS STARTED AT TODAY'S ER VISIT  New Prescriptions    No medications on file     =================================================================    HPI    Patient information was obtained from: Patient    Use of : N/A         Vernell Christensen is a 26 year old female with a pertinent history of diabetes mellitus (unspecified type) who presents to this ED  via walk-in for evaluation of a fever.     The patient was seen in the ED 3 days ago for a headache and reported fever, but did not measure a fever in the ED. Patient had unremarkable workup and improved with tylenol.     Patient presents to the ED today for a fever of 101 measured at home and reported worsening headache. She denies any additional symptoms. Patient denies cough, dysuria, rash, and abdominal pain.     She does not identify any waxing or waning symptoms otherwise, exacerbating or alleviating features,associated symptoms except as mentioned. She otherwise denies any pain related complaints.    Per chart review,  5/3/22-5/4/22 patient visited [facility] ED for evaluation of headache. Given history, reassuring exam, and in the absence of s/s suggestive of concerning intracranial pathology, I do feel that etiology is most likely primary/benign headache. I see no indications for lab/imaging workup on emergent basis and patient agrees.     REVIEW OF SYSTEMS   Review of Systems   Constitutional: Positive for fever.   Respiratory: Negative for cough.    Gastrointestinal: Negative for abdominal pain.   Genitourinary: Negative for dysuria.   Skin: Negative for rash.   Neurological: Positive for headaches.   All other systems reviewed and are negative.       PAST MEDICAL HISTORY:  Past Medical History:   Diagnosis Date     Diabetes mellitus (H)      PAST SURGICAL HISTORY:  Past Surgical History:   Procedure Laterality Date     LAPAROSCOPIC CHOLECYSTECTOMY N/A 9/4/2020    Procedure: CHOLECYSTECTOMY, LAPAROSCOPIC;  Surgeon: Joao Willett MD;  Location: [facility];  Service: General     NO PAST SURGERIES       CURRENT MEDICATIONS:    Prenatal Vit-Fe Fumarate-FA (PRENATAL MULTIVITAMIN W/IRON) 27-0.8 MG tablet  vitamin B6 (PYRIDOXINE) 50 MG TABS      ALLERGIES:  No Known Allergies      FAMILY HISTORY:  Family History   Problem Relation Age of Onset     Hypertension Maternal Grandmother      Diabetes  Maternal Grandmother      Diabetes Maternal Grandfather      Diabetes Paternal Grandmother      Diabetes Maternal Aunt      Diabetes Maternal Aunt      SOCIAL HISTORY:   Social History     Socioeconomic History     Marital status:    Tobacco Use     Smoking status: Never Smoker     Smokeless tobacco: Never Used   Substance and Sexual Activity     Alcohol use: No     Drug use: No     Sexual activity: Yes     Partners: Male     Birth control/protection: None   Social History Narrative    6/17/20  with 4 children. Niuean speaking   No drugs, alcohol, or tobacco.    VITALS:  BP (!) 146/83   Pulse (!) 122   Temp 99.7  F (37.6  C) (Oral)   Resp 18   Wt 77.1 kg (170 lb)   LMP 06/12/2021   SpO2 98%   BMI 30.11 kg/m      PHYSICAL EXAM    Vital Signs:  BP (!) 146/83   Pulse (!) 122   Temp 99.7  F (37.6  C) (Oral)   Resp 18   Wt 77.1 kg (170 lb)   LMP 06/12/2021   SpO2 98%   BMI 30.11 kg/m    General:  On entering the room she is in no apparent distress.    Neck:  Neck supple with full range of motion and nontender.  No meningismus.  Patient able to turn neck in all directions without difficulty.  Back:  Back and spine are nontender.  No costovertebral angle tenderness.    HEENT:  Oropharynx clear with moist mucous membranes.  HEENT unremarkable.    Pulmonary:  Chest clear to auscultation without rhonchi rales or wheezing.    Cardiovascular:  Cardiac regular rate and rhythm without murmurs rubs or gallops.    Abdomen:  Abdomen soft nontender.  There is no rebound or guarding.    Muskuloskeletal:  she moves all 4 without any difficulty and has normal neurovascular exams.  Extremities without clubbing, cyanosis, or edema.  Legs and calves are nontender.    Neuro:  she is alert and oriented ×3 and moves all extremities symmetrically.  Strength 5/5 in all 4 extremities.  Sensation tact in all 4 extremities.  Cranial nerves II through XII intact equal bilaterally.  Psych:  Normal affect.    Skin:   Unremarkable and warm and dry.       LAB:  All pertinent labs reviewed and interpreted.  Labs Ordered and Resulted from Time of ED Arrival to Time of ED Departure - No data to display    RADIOLOGY:  Reviewed all pertinent imaging. Please see official radiology report.  No orders to display              EKG:        PROCEDURES:         I, Iwona Peguero, am serving as a scribe to document services personally performed by Dr. Daigle based on my observation and the provider's statements to me. I, Juan Daigle MD attest that Iwoan Peguero is acting in a scribe capacity, has observed my performance of the services and has documented them in accordance with my direction.    Juan Daigle M.D.  Emergency Medicine  [facility] EMERGENCY ROOM  [address] [address]  [phone]  Dept: [phone]      Juan Daigle MD  05/07/22 0202

## 2022-05-08 NOTE — ED TRIAGE NOTES
"    pt presents to the ED with c/o worsening HA and fever. Pt was seen on 5/3/22 for same symptoms and states \"hasnt gotten better\".   "

## 2022-05-09 ENCOUNTER — PATIENT OUTREACH (OUTPATIENT)
Dept: CARE COORDINATION | Facility: CLINIC | Age: 27
End: 2022-05-09
Payer: COMMERCIAL

## 2022-05-09 DIAGNOSIS — Z71.89 OTHER SPECIFIED COUNSELING: ICD-10-CM

## 2022-05-09 LAB — BACTERIA UR CULT: NORMAL

## 2022-05-09 NOTE — PROGRESS NOTES
Clinic Care Coordination Contact  Community Health Worker Initial Outreach    CHW Initial Information Gathering:  Referral Source: IP Report  Preferred Hospital:  (She does not have a preferred hospital)  Current living arrangement:: I live in a private home with family  Type of residence:: Private home - stairs  Community Resources: None, [place] Programs (SNAP)  Supplies Currently Used at Home: None  Equipment Currently Used at Home: none  No PCP office visit in Past Year: No  Transportation means:: Regular car  CHW Additional Questions  If ED/Hospital discharge, follow-up appointment scheduled as recommended?: Yes (ED follow up reccomends to be seen in 1 day but patient is feeling better today and comfortable scheduling for 05/20/2022)  Medication changes made following ED/Hospital discharge?: Yes, patient to be scheduled with CC RN/SW within approx 1 business day  MyChart active?: Yes  Patient sent Social Determinants of Health questionnaire?: Yes    Patient accepts CC: Yes. Patient scheduled for assessment with CCC RN on 05/31/2022 at 1:00 PM. Patient noted desire to discuss resources for her and her baby since she is unemployed.     [facility]: Post-Discharge Note  SITUATION                                                      Admission:    Admission Date: 05/07/22   Reason for Admission: Fever, headache  Discharge:   Discharge Date: 05/08/22  Discharge Diagnosis: Nonintractable headache, unspecified chronicity pattern, unspecified headache type, urinary tract infection without hematuria, site unspecified    BACKGROUND                                                      Per hospital discharge summary and inpatient provider notes:  Vernell Christensen is a 26 year old female with a pertinent history of diabetes mellitus (unspecified type) who presents to this ED via walk-in for evaluation of a fever.      The patient was seen in the ED 3 days ago for a headache and reported fever, but did not measure a fever in  "the ED. Patient had unremarkable workup and improved with tylenol.      Patient presents to the ED today for a fever of 101 measured at home and reported worsening headache. She denies any additional symptoms. Patient denies cough, dysuria, rash, and abdominal pain.      She does not identify any waxing or waning symptoms otherwise, exacerbating or alleviating features,associated symptoms except as mentioned. She otherwise denies any pain related complaints.    ASSESSMENT      Enrollment  Primary Care Care Coordination Status: Potential    Discharge Assessment  How are you doing now that you are home?: \"Good\"  How are your symptoms? (Red Flag symptoms escalate to triage hotline per guidelines): Improved  Do you feel your condition is stable enough to be safe at home until your provider visit?: Yes  Does the patient have their discharge instructions? : Yes  Does the patient have questions regarding their discharge instructions? : No  Were you started on any new medications or were there changes to any of your previous medications? : Yes  Does the patient have all of their medications?: Yes  Do you have questions regarding any of your medications? : No  Do you have all of your needed medical supplies or equipment (DME)?  (i.e. oxygen tank, CPAP, cane, etc.): Yes  Discharge follow-up appointment scheduled within 14 calendar days? : No  Is patient agreeable to assistance with scheduling? : Yes (CHW scheduled a hospital follow up appointment 05/20/2022 with her primary care provider)    Post-op (CHW CTA Only)  If the patient had a surgery or procedure, do they have any questions for a nurse?: No    PLAN                                                      Outpatient Plan:     Schedule an appointment with Susan Bejarano MD (Family Medicine) in 1 day (5/8/2022); for reassessment of your headaches after your recent ER visits this week and so your doctor can make sure you're feeling improved    Future Appointments   Date " Time Provider Department Center   5/10/2022  2:00 PM [facility]   5/20/2022  9:00 AM [facility]         For any urgent concerns, please contact our 24 hour nurse triage line: [phone] ([phone])       Karen Sarmiento  Community Health Worker  [facility]  Ph: [phone]

## 2022-05-20 ENCOUNTER — OFFICE VISIT (OUTPATIENT)
Dept: FAMILY MEDICINE | Facility: CLINIC | Age: 27
End: 2022-05-20
Payer: COMMERCIAL

## 2022-05-20 VITALS
RESPIRATION RATE: 16 BRPM | HEART RATE: 83 BPM | BODY MASS INDEX: 32.95 KG/M2 | OXYGEN SATURATION: 98 % | SYSTOLIC BLOOD PRESSURE: 110 MMHG | WEIGHT: 186 LBS | DIASTOLIC BLOOD PRESSURE: 60 MMHG | TEMPERATURE: 98.3 F

## 2022-05-20 DIAGNOSIS — G44.209 TENSION HEADACHE: Primary | ICD-10-CM

## 2022-05-20 DIAGNOSIS — Z23 NEED FOR VACCINATION: ICD-10-CM

## 2022-05-20 DIAGNOSIS — Z63.8 FAMILY DISRUPTION: ICD-10-CM

## 2022-05-20 DIAGNOSIS — G56.03 BILATERAL CARPAL TUNNEL SYNDROME: ICD-10-CM

## 2022-05-20 DIAGNOSIS — Z33.1 PREGNANCY, INCIDENTAL: ICD-10-CM

## 2022-05-20 PROCEDURE — 0054A COVID-19,PF,PFIZER (12+ YRS): CPT | Performed by: FAMILY MEDICINE

## 2022-05-20 PROCEDURE — 91305 COVID-19,PF,PFIZER (12+ YRS): CPT | Performed by: FAMILY MEDICINE

## 2022-05-20 PROCEDURE — 99214 OFFICE O/P EST MOD 30 MIN: CPT | Mod: 25 | Performed by: FAMILY MEDICINE

## 2022-05-20 RX ORDER — IBUPROFEN 200 MG
400 TABLET ORAL EVERY 6 HOURS PRN
Qty: 90 TABLET | Refills: 3 | Status: SHIPPED | OUTPATIENT
Start: 2022-05-20 | End: 2023-05-12

## 2022-05-20 RX ORDER — PRENATAL VIT/IRON FUM/FOLIC AC 27MG-0.8MG
1 TABLET ORAL DAILY
Qty: 90 TABLET | Refills: 3 | Status: SHIPPED | OUTPATIENT
Start: 2022-05-20 | End: 2023-05-12

## 2022-05-20 SDOH — SOCIAL STABILITY - SOCIAL INSECURITY: OTHER SPECIFIED PROBLEMS RELATED TO PRIMARY SUPPORT GROUP: Z63.8

## 2022-05-20 NOTE — PROGRESS NOTES
ASSESSMENT/PLAN:   Vernell was seen today for er f/u.    Diagnoses and all orders for this visit:    Tension headache  -     ibuprofen (ADVIL/MOTRIN) 200 MG tablet; Take 2 tablets (400 mg) by mouth every 6 hours as needed for mild pain    Pregnancy, incidental  -     Prenatal Vit-Fe Fumarate-FA (PRENATAL MULTIVITAMIN W/IRON) 27-0.8 MG tablet; Take 1 tablet by mouth daily    Bilateral carpal tunnel syndrome  -     Cancel: Wrist/Arm/Hand Supplies Order for DME - ONLY FOR DME  -     Wrist/Arm/Hand Supplies Order for DME - ONLY FOR DME   Use ibuprofen, ice, and wrist braces.      Need for vaccination  -     COVID-19,PF,PFIZER (12+ Yrs GRAY LABEL)    Family disruption  -     Adult Mental Health  Referral; Future    She would like to see a counselor for both herself and her children.  She feels like they have had a lot of family changes and family disruptions in the last 2 years and would like to be able to talk to somebody about it.  She says that she feels safe in her current relationship    Reviewed various forms of birth control with her.  She will let me know if she wants to try any of these.      No follow-ups on file.       ======================================================    SUBJECTIVE  Vernell Christensen is a 26 year old female here for   1.  Headaches.  Improving now.  No vision changes .     2. Tinging and numbness in hands. She had carpal tunnel in the past in her right hand and wore a  brace for a long time . Now back.  Right worse than left .      She declines any birth control today but does have some questions about what options she has.    ROS  Complete 10 point review of systems negative except as noted above in HPI  She is breast-feeding    OBJECTIVE  /60   Pulse 83   Temp 98.3  F (36.8  C) (Oral)   Resp 16   Wt 84.4 kg (186 lb)   LMP 06/12/2021   SpO2 98%   BMI 32.95 kg/m     Gen:  Nad, alert  Tinel sign is positive in her bilateral hands.  No thenar eminence wasting.  Sensation is  intact in her hands.  Strength is 5 out of 5 in her fingers, wrists, elbows.  No erythema or warmth is noted.  Full range of motion in her hands and her wrists and elbows.  Tenderness along the insertion of her occipital trigger points as well as her scalenes.  Palpation of these reproduces her headaches.  Current Outpatient Medications   Medication     ibuprofen (ADVIL/MOTRIN) 200 MG tablet     Prenatal Vit-Fe Fumarate-FA (PRENATAL MULTIVITAMIN W/IRON) 27-0.8 MG tablet     vitamin B6 (PYRIDOXINE) 50 MG TABS     No current facility-administered medications for this visit.      Patient Active Problem List   Diagnosis     Cholestasis during pregnancy     Class 2 obesity     Drug-induced weight gain     Insulin resistance     Symptomatic cholelithiasis        LABS & IMAGES   No results found for any visits on 05/20/22.      ======================================================    MDM          Options for treatment and follow-up care were reviewed with the patient. Vernell Christensen and/or guardian was engaged and actively involved in the decision making process. Vernell Christensen and/or guardian verbalized understanding of the options discussed and was satisfied with the final plan.      Susan Bejarano MD

## 2022-05-23 ENCOUNTER — APPOINTMENT (OUTPATIENT)
Dept: INTERPRETER SERVICES | Facility: CLINIC | Age: 27
End: 2022-05-23
Payer: COMMERCIAL

## 2022-05-31 ENCOUNTER — PATIENT OUTREACH (OUTPATIENT)
Dept: CARE COORDINATION | Facility: CLINIC | Age: 27
End: 2022-05-31
Payer: COMMERCIAL

## 2022-06-02 ENCOUNTER — PATIENT OUTREACH (OUTPATIENT)
Dept: NURSING | Facility: CLINIC | Age: 27
End: 2022-06-02
Payer: COMMERCIAL

## 2022-06-02 ENCOUNTER — APPOINTMENT (OUTPATIENT)
Dept: INTERPRETER SERVICES | Facility: CLINIC | Age: 27
End: 2022-06-02
Payer: COMMERCIAL

## 2022-06-02 NOTE — PROGRESS NOTES
Clinic Care Coordination Contact  [facility]/Voicemail    Clinical Data: Care Coordinator Outreach  Outreach attempted x 1. Patient was no showed with CCC RN for assessment on 5/31/2022, CHW tried to call and went straight to voice mail and left a message for patient with detail to call back.     Plan: will try to reach patient again in 1-2 business days.

## 2022-06-02 NOTE — PROGRESS NOTES
Clinic Care Coordination Contact  Due Date: Within 2 weeks from today's date, 6/2/2022  Delegation: No Show for RN appointment. Please follow unsuccessful outreach, no show standard work.    CCRN tried calling patient with the help of professional  today. Patient answered the phone 1st attempt then she hung up.  then tried calling back x2 but went straight to voicemail.

## 2022-06-16 ENCOUNTER — PATIENT OUTREACH (OUTPATIENT)
Dept: CARE COORDINATION | Facility: CLINIC | Age: 27
End: 2022-06-16
Payer: COMMERCIAL

## 2022-06-16 NOTE — PROGRESS NOTES
Clinic Care Coordination Contact  Due Date: Within 2 weeks from today's date, 6/16/2022  Delegation: No Show for RN appointment. Please follow unsuccessful outreach, no show standard work.    CCRN and F  - Reynold Prabhakar attempted calling patient today but patient didn't answer her phone. CW to attempt to reschedule an assessment last attempt if she agrees. CCRN wasn't able get a  earlier today x2.

## 2022-07-17 ENCOUNTER — HEALTH MAINTENANCE LETTER (OUTPATIENT)
Age: 27
End: 2022-07-17

## 2022-09-30 NOTE — ANESTHESIA POSTPROCEDURE EVALUATION
Patient: Vernell Christensen  Procedure(s):  CHOLECYSTECTOMY, LAPAROSCOPIC  Anesthesia type: general    Patient location: PACU  Last vitals:   Vitals Value Taken Time   /76 9/4/2020  3:35 PM   Temp 36.4  C (97.6  F) 9/4/2020  3:35 PM   Pulse 55 9/4/2020  3:35 PM   Resp 20 9/4/2020  3:35 PM   SpO2 95 % 9/4/2020  3:35 PM     Post vital signs: stable  Level of consciousness: awake and responds to simple questions  Post-anesthesia pain: pain controlled  Post-anesthesia nausea and vomiting: no  Pulmonary: unassisted, return to baseline  Cardiovascular: stable and blood pressure at baseline  Hydration: adequate  Anesthetic events: no    QCDR Measures:  ASA# 11 - Alyce-op Cardiac Arrest: ASA11B - Patient did NOT experience unanticipated cardiac arrest  ASA# 12 - Alyce-op Mortality Rate: ASA12B - Patient did NOT die  ASA# 13 - PACU Re-Intubation Rate: ASA13B - Patient did NOT require a new airway mgmt  ASA# 10 - Composite Anes Safety: ASA10A - No serious adverse event    Additional Notes:   74

## 2023-03-30 NOTE — TELEPHONE ENCOUNTER
Bed: 07  Expected date:   Expected time:   [facility]  Comments:  [facility]    New Appointment Needed  What is the reason for the visit:    Patient called to schedule removal of nexplanon and to have a IUD put in.  Patient states she was told when she was ready she could just come in for this to be done.    Writer did not see any recent notes on this change.  Please advise if consult us needed prior or if okay to schedule how much time/appt type is needed.    Provider Preference: PCP only  How soon do you need to be seen?: as able   Waitlist offered?: No  Okay to leave a detailed message:  Yes - would need to use

## 2023-04-27 ENCOUNTER — APPOINTMENT (OUTPATIENT)
Dept: ULTRASOUND IMAGING | Facility: CLINIC | Age: 28
End: 2023-04-27
Attending: EMERGENCY MEDICINE
Payer: COMMERCIAL

## 2023-04-27 ENCOUNTER — HOSPITAL ENCOUNTER (EMERGENCY)
Facility: CLINIC | Age: 28
Discharge: HOME | End: 2023-04-27
Attending: EMERGENCY MEDICINE | Admitting: EMERGENCY MEDICINE
Payer: COMMERCIAL

## 2023-04-27 VITALS
DIASTOLIC BLOOD PRESSURE: 84 MMHG | OXYGEN SATURATION: 97 % | SYSTOLIC BLOOD PRESSURE: 131 MMHG | RESPIRATION RATE: 19 BRPM | TEMPERATURE: 98.6 F | HEART RATE: 82 BPM

## 2023-04-27 DIAGNOSIS — N10 ACUTE PYELONEPHRITIS: ICD-10-CM

## 2023-04-27 DIAGNOSIS — Z33.1 PREGNANT STATE, INCIDENTAL: ICD-10-CM

## 2023-04-27 LAB
ABO/RH(D): NORMAL
ALBUMIN UR-MCNC: 10 MG/DL
ANION GAP SERPL CALCULATED.3IONS-SCNC: 9 MMOL/L (ref 5–18)
APPEARANCE UR: ABNORMAL
BACTERIA #/AREA URNS HPF: ABNORMAL /HPF
BASOPHILS # BLD AUTO: 0.1 10E3/UL (ref 0–0.2)
BASOPHILS NFR BLD AUTO: 1 %
BILIRUB UR QL STRIP: NEGATIVE
BUN SERPL-MCNC: 10 MG/DL (ref 8–22)
C REACTIVE PROTEIN LHE: 1.5 MG/DL (ref 0–?)
CALCIUM SERPL-MCNC: 9.6 MG/DL (ref 8.5–10.5)
CAOX CRY #/AREA URNS HPF: ABNORMAL /HPF
CHLORIDE BLD-SCNC: 104 MMOL/L (ref 98–107)
CO2 SERPL-SCNC: 23 MMOL/L (ref 22–31)
COLOR UR AUTO: ABNORMAL
CREAT SERPL-MCNC: 0.66 MG/DL (ref 0.6–1.1)
EOSINOPHIL # BLD AUTO: 0.3 10E3/UL (ref 0–0.7)
EOSINOPHIL NFR BLD AUTO: 3 %
ERYTHROCYTE [DISTWIDTH] IN BLOOD BY AUTOMATED COUNT: 13.9 % (ref 10–15)
GFR SERPL CREATININE-BSD FRML MDRD: >90 ML/MIN/1.73M2
GLUCOSE BLD-MCNC: 93 MG/DL (ref 70–125)
GLUCOSE UR STRIP-MCNC: NEGATIVE MG/DL
HCG SERPL-ACNC: ABNORMAL MLU/ML (ref 0–4)
HCT VFR BLD AUTO: 38 % (ref 35–47)
HGB BLD-MCNC: 12.2 G/DL (ref 11.7–15.7)
HGB UR QL STRIP: NEGATIVE
IMM GRANULOCYTES # BLD: 0 10E3/UL
IMM GRANULOCYTES NFR BLD: 0 %
KETONES UR STRIP-MCNC: NEGATIVE MG/DL
LEUKOCYTE ESTERASE UR QL STRIP: NEGATIVE
LYMPHOCYTES # BLD AUTO: 3.1 10E3/UL (ref 0.8–5.3)
LYMPHOCYTES NFR BLD AUTO: 29 %
MCH RBC QN AUTO: 26.1 PG (ref 26.5–33)
MCHC RBC AUTO-ENTMCNC: 32.1 G/DL (ref 31.5–36.5)
MCV RBC AUTO: 81 FL (ref 78–100)
MONOCYTES # BLD AUTO: 0.5 10E3/UL (ref 0–1.3)
MONOCYTES NFR BLD AUTO: 4 %
MUCOUS THREADS #/AREA URNS LPF: PRESENT /LPF
NEUTROPHILS # BLD AUTO: 6.9 10E3/UL (ref 1.6–8.3)
NEUTROPHILS NFR BLD AUTO: 63 %
NITRATE UR QL: POSITIVE
NRBC # BLD AUTO: 0 10E3/UL
NRBC BLD AUTO-RTO: 0 /100
PH UR STRIP: 6.5 [PH] (ref 5–7)
PLATELET # BLD AUTO: 319 10E3/UL (ref 150–450)
POTASSIUM BLD-SCNC: 3.6 MMOL/L (ref 3.5–5)
RBC # BLD AUTO: 4.68 10E6/UL (ref 3.8–5.2)
RBC URINE: 1 /HPF
SODIUM SERPL-SCNC: 136 MMOL/L (ref 136–145)
SP GR UR STRIP: 1.03 (ref 1–1.03)
SPECIMEN EXPIRATION DATE: NORMAL
SQUAMOUS EPITHELIAL: 1 /HPF
UROBILINOGEN UR STRIP-MCNC: 2 MG/DL
WBC # BLD AUTO: 10.9 10E3/UL (ref 4–11)
WBC URINE: 4 /HPF

## 2023-04-27 PROCEDURE — 250N000011 HC RX IP 250 OP 636: Performed by: EMERGENCY MEDICINE

## 2023-04-27 PROCEDURE — 76801 OB US < 14 WKS SINGLE FETUS: CPT

## 2023-04-27 PROCEDURE — 96365 THER/PROPH/DIAG IV INF INIT: CPT

## 2023-04-27 PROCEDURE — 80048 BASIC METABOLIC PNL TOTAL CA: CPT | Performed by: EMERGENCY MEDICINE

## 2023-04-27 PROCEDURE — 86901 BLOOD TYPING SEROLOGIC RH(D): CPT | Performed by: EMERGENCY MEDICINE

## 2023-04-27 PROCEDURE — 84702 CHORIONIC GONADOTROPIN TEST: CPT | Performed by: EMERGENCY MEDICINE

## 2023-04-27 PROCEDURE — 76775 US EXAM ABDO BACK WALL LIM: CPT

## 2023-04-27 PROCEDURE — 36415 COLL VENOUS BLD VENIPUNCTURE: CPT | Performed by: EMERGENCY MEDICINE

## 2023-04-27 PROCEDURE — 99285 EMERGENCY DEPT VISIT HI MDM: CPT | Mod: 25

## 2023-04-27 PROCEDURE — 87086 URINE CULTURE/COLONY COUNT: CPT | Performed by: EMERGENCY MEDICINE

## 2023-04-27 PROCEDURE — 81001 URINALYSIS AUTO W/SCOPE: CPT | Performed by: EMERGENCY MEDICINE

## 2023-04-27 PROCEDURE — 86140 C-REACTIVE PROTEIN: CPT | Performed by: EMERGENCY MEDICINE

## 2023-04-27 PROCEDURE — 250N000013 HC RX MED GY IP 250 OP 250 PS 637: Performed by: EMERGENCY MEDICINE

## 2023-04-27 PROCEDURE — 85004 AUTOMATED DIFF WBC COUNT: CPT | Performed by: EMERGENCY MEDICINE

## 2023-04-27 RX ORDER — ACETAMINOPHEN 325 MG/1
650 TABLET ORAL ONCE
Status: COMPLETED | OUTPATIENT
Start: 2023-04-27 | End: 2023-04-27

## 2023-04-27 RX ORDER — CEFTRIAXONE 1 G/1
1 INJECTION, POWDER, FOR SOLUTION INTRAMUSCULAR; INTRAVENOUS ONCE
Status: COMPLETED | OUTPATIENT
Start: 2023-04-27 | End: 2023-04-27

## 2023-04-27 RX ORDER — CEFDINIR 300 MG/1
300 CAPSULE ORAL 2 TIMES DAILY
Qty: 20 CAPSULE | Refills: 0 | Status: SHIPPED | OUTPATIENT
Start: 2023-04-27 | End: 2023-05-07

## 2023-04-27 RX ADMIN — ACETAMINOPHEN 650 MG: 325 TABLET ORAL at 17:27

## 2023-04-27 RX ADMIN — CEFTRIAXONE 1 G: 1 INJECTION, POWDER, FOR SOLUTION INTRAMUSCULAR; INTRAVENOUS at 19:58

## 2023-04-27 ASSESSMENT — ENCOUNTER SYMPTOMS
DYSURIA: 0
FLANK PAIN: 1
FREQUENCY: 0
BACK PAIN: 1
VOMITING: 0
NAUSEA: 0

## 2023-04-27 ASSESSMENT — ACTIVITIES OF DAILY LIVING (ADL)
ADLS_ACUITY_SCORE: 35
ADLS_ACUITY_SCORE: 35

## 2023-04-27 NOTE — ED PROVIDER NOTES
EMERGENCY DEPARTMENT ENCOUNTER      NAME: Vernell Christensen  AGE: 27 year old female  YOB: 1995  MRN: [id number]  EVALUATION DATE & TIME: 2023  3:27 PM    PCP: Susan Bejarano    ED PROVIDER: Adiel Cuellar DO      Chief Complaint   Patient presents with     Abdominal Pain         FINAL IMPRESSION:  1. Acute pyelonephritis    2. Pregnant state, incidental          ED COURSE & MEDICAL DECISION MAKIN-year-old female who is currently pregnant presented to the ED for evaluation of sudden onset left pelvic/flank/low back pain that started earlier this morning around 530.  Patient notes that the pain has been constant since onset.  She denied any associated vaginal bleeding or discharge or any urinary symptoms.  Upon arrival to the ED the patient was hemodynamically stable.  She did not appear to be in any obvious distress or discomfort at the time for initial evaluation.  On exam the patient was noted to have CVA tenderness of patient noted on the left side.  She did not have any reproducible abdominal tenderness palpation, however.  Remainder of her physical exam was unremarkable.  Following her initial evaluation the patient was given a dose of Tylenol for pain control.    The patient's hCG was 22,941.  Urinalysis appears consistent with a urinary tract infection.      Pelvic ultrasound shows a normal intrauterine pregnancy at 6 weeks 5 days gestation.  A left kidney ultrasound was also obtained to evaluate for possible hydronephrosis.  Ultrasound left kidney was normal, however.    CBC and BMP were both unremarkable.  The patient was given a dose of IV Rocephin here in the ED to treat the suspected left-sided pyelonephritis.  The patient noted that the pain improved with the Tylenol.    Because of the suspected pyelonephritis and the fact that the patient is pregnant her case was discussed with the on-call OB provider.  Because she does not have fever or elevated white blood cell count and because  her pain was controlled here in the ED with Tylenol admission was not felt to be a necessity at this time.  The patient was reevaluated and informed of the OB providers recommendations.  Overnight observation was discussed with the patient.  However, the patient requested to return home.  The patient was instructed to follow-up with her primary care provider or OB provider for reevaluation within the next few days.  The patient was sent home with Omnicef to treat the urinary tract infection.  The patient was instructed to return back to ED sooner for any worsening pain, vomiting, fevers, vaginal bleeding or discharge, or any other new or concerning symptoms.    Pertinent Labs & Imaging studies reviewed. (See chart for details)  5:00 PM I met with the patient to gather history and to perform my initial exam. We discussed plans for the ED course, including diagnostic testing and treatment.   6:38 PM I spoke to Dr. Belle OBGYESTHER, regarding patient       At the conclusion of the encounter I discussed the results of all of the tests and the disposition. The questions were answered. The patient or family acknowledged understanding and was agreeable with the care plan.     Medical Decision Making    History:    Supplemental history from: Documented in chart, if applicable    External Record(s) reviewed: Documented in chart, if applicable.    Work Up:    Chart documentation includes differential considered and any EKGs or imaging independently interpreted by provider, where specified.    In additional to work up documented, I considered the following work up: Documented in chart, if applicable.    External consultation:    Discussion of management with another provider: Documented in chart, if applicable    Complicating factors:    Care impacted by chronic illness: N/A and Diabetes    Care affected by social determinants of health: N/A    Disposition considerations: Discharge. I prescribed additional prescription strength  medication(s) as charted. I considered admission, but discharged the patient after share decision making conversation.      PPE worn: procedure mask    MEDICATIONS GIVEN IN THE EMERGENCY:  Medications   acetaminophen (TYLENOL) tablet 650 mg (650 mg Oral $Given 4/27/23 1727)   cefTRIAXone (ROCEPHIN) 1 g vial to attach to  mL bag for ADULTS or NS 50 mL bag for PEDS (0 g Intravenous Stopped 4/27/23 2038)       NEW PRESCRIPTIONS STARTED AT TODAY'S ER VISIT  Discharge Medication List as of 4/27/2023  8:38 PM      START taking these medications    Details   cefdinir (OMNICEF) 300 MG capsule Take 1 capsule (300 mg) by mouth 2 times daily for 10 days, Disp-20 capsule, R-0, E-Prescribe                =================================================================    HPI    Patient information was obtained from: Patient     Use of : N/A (Patient declined an  and was able to converse in English)      Vernell Christensen is a 27 year old female with a pertinent history of cholestasis during pregnancy, cholelithiasis, insulin resistance, who presents to this ED by walk in for evaluation of abdominal pain.    Patient reports sudden onset pelvic, left flank, and left low back pain. Her pain began today around 5:30 AM and has been constant since. Patient has not taken any medications for her pain. Patient states she is pregnant, with her last menstrual period on 3/4. Patient denies vaginal bleeding, abnomal vaginal discharge, dysuria, frequency, cloudy or malodorous urine, nausea, vomiting, or any other complaints at this time.       REVIEW OF SYSTEMS   Review of Systems   Gastrointestinal: Negative for nausea and vomiting.   Genitourinary: Positive for flank pain (left) and pelvic pain. Negative for dysuria, frequency, vaginal bleeding and vaginal discharge.        Negative for cloudy or malodorous urine   Musculoskeletal: Positive for back pain (lower left).   All other systems reviewed and are negative.        PAST MEDICAL HISTORY:  Past Medical History:   Diagnosis Date     Diabetes mellitus (H)        PAST SURGICAL HISTORY:  Past Surgical History:   Procedure Laterality Date     LAPAROSCOPIC CHOLECYSTECTOMY N/A 9/4/2020    Procedure: CHOLECYSTECTOMY, LAPAROSCOPIC;  Surgeon: Joao Willett MD;  Location: [facility] OR;  Service: General     NO PAST SURGERIES             CURRENT MEDICATIONS:    cefdinir (OMNICEF) 300 MG capsule  ibuprofen (ADVIL/MOTRIN) 200 MG tablet  Prenatal Vit-Fe Fumarate-FA (PRENATAL MULTIVITAMIN W/IRON) 27-0.8 MG tablet  vitamin B6 (PYRIDOXINE) 50 MG TABS        ALLERGIES:  No Known Allergies    FAMILY HISTORY:  Family History   Problem Relation Age of Onset     Hypertension Maternal Grandmother      Diabetes Maternal Grandmother      Diabetes Maternal Grandfather      Diabetes Paternal Grandmother      Diabetes Maternal Aunt      Diabetes Maternal Aunt        SOCIAL HISTORY:   Social History     Socioeconomic History     Marital status:      Spouse name: None     Number of children: None     Years of education: None     Highest education level: None   Tobacco Use     Smoking status: Never     Smokeless tobacco: Never   Substance and Sexual Activity     Alcohol use: No     Drug use: No     Sexual activity: Yes     Partners: Male     Birth control/protection: None   Social History Narrative    6/17/20  with 4 children. English speaking       VITALS:  /84   Pulse 82   Temp 98.6  F (37  C) (Temporal)   Resp 19   SpO2 97%     PHYSICAL EXAM    General presentation: Alert, Vital signs reviewed. NAD  HENT: ENT inspection is normal. Oropharynx is moist and clear.   Eye: Pupils are equal and reactive to light. EOMI  Neck: The neck is supple, with full ROM, with no evidence of meningismus.  Pulmonary: Currently in no acute respiratory distress. Normal, non labored respirations, the lung sounds are normal with good equal air movement. Clear to auscultation bilaterally.    Circulatory: Regular rate and rhythm. Peripheral pulses are strong and equal. No murmurs, rubs, or gallops.   Abdominal: The abdomen is soft. Nontender. No rigidity, guarding, or rebound. Bowel sounds normal.   Neurologic: Alert, oriented to person, place, and time. No motor deficit. No sensory deficit. Cranial nerves II through XII are intact.  Musculoskeletal: No extremity tenderness. Full range of motion in all extremities. No extremity edema. CVA tenderness to palpation on left   Skin: Skin color is normal. No rash. Warm. Dry to touch.      LAB:  All pertinent labs reviewed and interpreted.  Results for orders placed or performed during the hospital encounter of 04/27/23   OB  US 1st trimester w transvag    Impression    IMPRESSION:   1.  Single living intrauterine gestation at 6 weeks 5 days, EDC 12/16/2023.       US Kidney Left    Impression    IMPRESSION:  Normal left kidney ultrasound.   UA with Microscopic reflex to Culture    Specimen: Urine, Clean Catch   Result Value Ref Range    Color Urine Light Yellow Colorless, Straw, Light Yellow, Yellow    Appearance Urine Turbid (A) Clear    Glucose Urine Negative Negative mg/dL    Bilirubin Urine Negative Negative    Ketones Urine Negative Negative mg/dL    Specific Gravity Urine 1.029 1.001 - 1.030    Blood Urine Negative Negative    pH Urine 6.5 5.0 - 7.0    Protein Albumin Urine 10 (A) Negative mg/dL    Urobilinogen Urine 2.0 (A) <2.0 mg/dL    Nitrite Urine Positive (A) Negative    Leukocyte Esterase Urine Negative Negative    Bacteria Urine Moderate (A) None Seen /HPF    Mucus Urine Present (A) None Seen /LPF    Calcium Oxalate Crystals Urine Few (A) None Seen /HPF    RBC Urine 1 <=2 /HPF    WBC Urine 4 <=5 /HPF    Squamous Epithelials Urine 1 <=1 /HPF   HCG quantitative pregnancy (blood)   Result Value Ref Range    hCG Quantitative 22,941 (H) 0 - 4 mlU/mL   Basic metabolic panel   Result Value Ref Range    Sodium 136 136 - 145 mmol/L    Potassium 3.6 3.5 -  5.0 mmol/L    Chloride 104 98 - 107 mmol/L    Carbon Dioxide (CO2) 23 22 - 31 mmol/L    Anion Gap 9 5 - 18 mmol/L    Urea Nitrogen 10 8 - 22 mg/dL    Creatinine 0.66 0.60 - 1.10 mg/dL    Calcium 9.6 8.5 - 10.5 mg/dL    Glucose 93 70 - 125 mg/dL    GFR Estimate >90 >60 mL/min/1.73m2   CRP inflammation   Result Value Ref Range    CRP 1.5 (H) 0.0 - <0.8 mg/dL   CBC with platelets and differential   Result Value Ref Range    WBC Count 10.9 4.0 - 11.0 10e3/uL    RBC Count 4.68 3.80 - 5.20 10e6/uL    Hemoglobin 12.2 11.7 - 15.7 g/dL    Hematocrit 38.0 35.0 - 47.0 %    MCV 81 78 - 100 fL    MCH 26.1 (L) 26.5 - 33.0 pg    MCHC 32.1 31.5 - 36.5 g/dL    RDW 13.9 10.0 - 15.0 %    Platelet Count 319 150 - 450 10e3/uL    % Neutrophils 63 %    % Lymphocytes 29 %    % Monocytes 4 %    % Eosinophils 3 %    % Basophils 1 %    % Immature Granulocytes 0 %    NRBCs per 100 WBC 0 <1 /100    Absolute Neutrophils 6.9 1.6 - 8.3 10e3/uL    Absolute Lymphocytes 3.1 0.8 - 5.3 10e3/uL    Absolute Monocytes 0.5 0.0 - 1.3 10e3/uL    Absolute Eosinophils 0.3 0.0 - 0.7 10e3/uL    Absolute Basophils 0.1 0.0 - 0.2 10e3/uL    Absolute Immature Granulocytes 0.0 <=0.4 10e3/uL    Absolute NRBCs 0.0 10e3/uL   ABO and Rh   Result Value Ref Range    ABO/RH(D) O POS     SPECIMEN EXPIRATION DATE [id number]        RADIOLOGY:  Reviewed all pertinent imaging. Please see official radiology report.  US Kidney Left   Final Result   IMPRESSION:   Normal left kidney ultrasound.      OB  US 1st trimester w transvag   Final Result   IMPRESSION:    1.  Single living intrauterine gestation at 6 weeks 5 days, EDC 12/16/2023.                  I, Rose Srinivasan , am serving as a scribe to document services personally performed by Adiel Cuellar DO based on my observation and the provider's statements to me. I, Jaremy Alisa, attest that Rose Srinivasan is acting in a scribe capacity, has observed my performance of the services and has documented them in accordance with my  direction.    Adiel Cuellar DO  Emergency Medicine  [facility] EMERGENCY ROOM  [facility] [facility] [id number] [phone]       Adiel Cuellar DO  04/28/23 0010

## 2023-04-27 NOTE — ED TRIAGE NOTES
Pt c/o of lower abdomen, pelvic area. It has been going on since about 0530 this AM. Denies any vaginal bleeding. Reports being pregnant and just tested positive last week, 4th pregnancy. LMP March 5th. Also c/o headache.

## 2023-04-28 NOTE — DISCHARGE INSTRUCTIONS
Your symptoms and the urinalysis results are concerning for a left-sided kidney infection.  Take the prescribed antibiotic as directed to treat to the urinary tract infection.  Continue using over-the-counter Tylenol as needed for any further pain.  Please follow-up with your primary care provider or OB provider within the next few days for reevaluation.  Return back to ED sooner for any worsening pain, vomiting, fevers, vaginal bleeding or discharge, or any other new or concerning symptoms.

## 2023-04-28 NOTE — ED NOTES
Pt denies pain, states she is feeling okay to go home. Has no questions. Instructed discharge information, pt aware to take antibiotics and to follow up with OB.

## 2023-04-30 LAB — BACTERIA UR CULT: ABNORMAL

## 2023-05-02 ENCOUNTER — OFFICE VISIT (OUTPATIENT)
Dept: FAMILY MEDICINE | Facility: CLINIC | Age: 28
End: 2023-05-02
Payer: COMMERCIAL

## 2023-05-02 VITALS
HEIGHT: 63 IN | WEIGHT: 200 LBS | BODY MASS INDEX: 35.44 KG/M2 | HEART RATE: 86 BPM | DIASTOLIC BLOOD PRESSURE: 78 MMHG | SYSTOLIC BLOOD PRESSURE: 124 MMHG | TEMPERATURE: 98.4 F | OXYGEN SATURATION: 98 % | RESPIRATION RATE: 16 BRPM

## 2023-05-02 DIAGNOSIS — Z87.440 H/O URINARY TRACT INFECTION: Primary | ICD-10-CM

## 2023-05-02 DIAGNOSIS — Z00.00 PREVENTATIVE HEALTH CARE: ICD-10-CM

## 2023-05-02 DIAGNOSIS — Z23 NEED FOR HEPATITIS B BOOSTER VACCINATION: ICD-10-CM

## 2023-05-02 DIAGNOSIS — E66.9 OBESITY (BMI 30-39.9): ICD-10-CM

## 2023-05-02 DIAGNOSIS — Z33.1 PREGNANCY, INCIDENTAL: ICD-10-CM

## 2023-05-02 PROBLEM — E66.01 MORBID OBESITY (H): Status: ACTIVE | Noted: 2023-05-02

## 2023-05-02 LAB
ALBUMIN UR-MCNC: NEGATIVE MG/DL
APPEARANCE UR: ABNORMAL
BACTERIA #/AREA URNS HPF: ABNORMAL /HPF
BILIRUB UR QL STRIP: NEGATIVE
COLOR UR AUTO: YELLOW
GLUCOSE UR STRIP-MCNC: NEGATIVE MG/DL
HGB UR QL STRIP: NEGATIVE
KETONES UR STRIP-MCNC: NEGATIVE MG/DL
LEUKOCYTE ESTERASE UR QL STRIP: NEGATIVE
MUCOUS THREADS #/AREA URNS LPF: PRESENT /LPF
NITRATE UR QL: NEGATIVE
PH UR STRIP: 6 [PH] (ref 5–7)
RBC #/AREA URNS AUTO: ABNORMAL /HPF
SP GR UR STRIP: >=1.03 (ref 1–1.03)
SQUAMOUS #/AREA URNS AUTO: ABNORMAL /LPF
UROBILINOGEN UR STRIP-ACNC: 0.2 E.U./DL
WBC #/AREA URNS AUTO: ABNORMAL /HPF

## 2023-05-02 PROCEDURE — 90471 IMMUNIZATION ADMIN: CPT | Performed by: FAMILY MEDICINE

## 2023-05-02 PROCEDURE — 87086 URINE CULTURE/COLONY COUNT: CPT | Performed by: FAMILY MEDICINE

## 2023-05-02 PROCEDURE — 99214 OFFICE O/P EST MOD 30 MIN: CPT | Mod: 25 | Performed by: FAMILY MEDICINE

## 2023-05-02 PROCEDURE — 90746 HEPB VACCINE 3 DOSE ADULT IM: CPT | Performed by: FAMILY MEDICINE

## 2023-05-02 PROCEDURE — 81001 URINALYSIS AUTO W/SCOPE: CPT | Performed by: FAMILY MEDICINE

## 2023-05-02 NOTE — PROGRESS NOTES
"  Assessment & Plan     H/O urinary tract infection  Doing well - will finish antibiotics as planned, plus push fluids  - UA Macroscopic with reflex to Microscopic and Culture; Future  - Urine Culture Aerobic Bacterial; Future  - UA Macroscopic with reflex to Microscopic and Culture; Standing  - PRIMARY CARE FOLLOW-UP SCHEDULING; Future  - UA Macroscopic with reflex to Microscopic and Culture  - UA Microscopic with Reflex to Culture  - Urine Culture Aerobic Bacterial    Obesity (BMI 30-39.9)  noted    Need for hepatitis B booster vaccination  Gave #1  - HEPATITIS B VACCINE ADULT 3 DOSE IM (ENGERIX-B/RECOMBIVAX HB)    Preventative health care  reviewed  - REVIEW OF HEALTH MAINTENANCE PROTOCOL ORDERS    Pregnancy, incidental  She has her first prenatal appt set up. Had an ultrasound confirming dates in the ER.  - PRIMARY CARE FOLLOW-UP SCHEDULING; Future    Review of external notes as documented elsewhere in note  Prescription drug management  30 minutes spent by me on the date of the encounter doing chart review, history and exam, documentation and further activities per the note     MED REC REQUIRED  Post Medication Reconciliation Status: discharge medications reconciled, continue medications without change  BMI:   Estimated body mass index is 35.43 kg/m  as calculated from the following:    Height as of this encounter: 1.6 m (5' 3\").    Weight as of this encounter: 90.7 kg (200 lb).   Weight management plan: Discussed healthy diet and exercise guidelines    Zee Workman MD  [facility] Christ[facility] is a 27 year old year old, presenting for the following health issues:  RECHECK (ER follow up) and Breathing Problem (Noticed a month ago, Felt chocking or hard to breath while sleeping)        HPI   Is pregnant - only 5 weeks.    Is taking antibiotics yet  Urine is not yet clear, no smell - so it is improved. No pain with urination at this time.      Review of Systems " "      Objective    /78 (BP Location: Right arm)   Pulse 86   Temp 98.4  F (36.9  C) (Oral)   Resp 16   Ht 1.6 m (5' 3\")   Wt 90.7 kg (200 lb)   LMP 03/05/2023   SpO2 98%   BMI 35.43 kg/m    Body mass index is 35.43 kg/m .  Physical Exam   GENERAL: healthy, alert and no distress  ABDOMEN: soft, nontender, no hepatosplenomegaly, no masses and bowel sounds normal  MS: no gross musculoskeletal defects noted, no edema  SKIN: no suspicious lesions or rashes  PSYCH: mentation appears normal, affect normal/bright    Admission on 04/27/2023, Discharged on 04/27/2023   Component Date Value Ref Range Status     Color Urine 04/27/2023 Light Yellow  Colorless, Straw, Light Yellow, Yellow Final     Appearance Urine 04/27/2023 Turbid (A)  Clear Final     Glucose Urine 04/27/2023 Negative  Negative mg/dL Final     Bilirubin Urine 04/27/2023 Negative  Negative Final     Ketones Urine 04/27/2023 Negative  Negative mg/dL Final     Specific Gravity Urine 04/27/2023 1.029  1.001 - 1.030 Final     Blood Urine 04/27/2023 Negative  Negative Final     pH Urine 04/27/2023 6.5  5.0 - 7.0 Final     Protein Albumin Urine 04/27/2023 10 (A)  Negative mg/dL Final     Urobilinogen Urine 04/27/2023 2.0 (A)  <2.0 mg/dL Final     Nitrite Urine 04/27/2023 Positive (A)  Negative Final     Leukocyte Esterase Urine 04/27/2023 Negative  Negative Final     Bacteria Urine 04/27/2023 Moderate (A)  None Seen /HPF Final     Mucus Urine 04/27/2023 Present (A)  None Seen /LPF Final     Calcium Oxalate Crystals Urine 04/27/2023 Few (A)  None Seen /HPF Final     RBC Urine 04/27/2023 1  <=2 /HPF Final     WBC Urine 04/27/2023 4  <=5 /HPF Final     Squamous Epithelials Urine 04/27/2023 1  <=1 /HPF Final     hCG Quantitative 04/27/2023 22,941 (H)  0 - 4 mlU/mL Final    Non-pregnant female . . . . . . . . . . . . . 0-4 (<5) mIU/mL  Equivocal result for early pregnancy . . . . 5-24 mIU/mL  Values in pregnancy should double every 2-3 days for the first 6 " weeks. Patients with very low levels of hCG should be resampled and retested after 48 hours.   For diagnostic purposes, hCG results should be used   conjunction with other data.   If the hCG level is inconsistent with clinical evidence,   results should be confirmed by an alternate hCG method.   This assay is approved for use in the early detection   of pregnancy only. It is not approved for any other   uses such as tumor marker screening or monitoring.     ABO/RH(D) 04/27/2023 O POS   Final     SPECIMEN EXPIRATION DATE 04/27/2023 [id number]   Final     Culture 04/27/2023 10,000-50,000 CFU/mL Escherichia coli (A)   Final     Sodium 04/27/2023 136  136 - 145 mmol/L Final     Potassium 04/27/2023 3.6  3.5 - 5.0 mmol/L Final     Chloride 04/27/2023 104  98 - 107 mmol/L Final     Carbon Dioxide (CO2) 04/27/2023 23  22 - 31 mmol/L Final     Anion Gap 04/27/2023 9  5 - 18 mmol/L Final     Urea Nitrogen 04/27/2023 10  8 - 22 mg/dL Final     Creatinine 04/27/2023 0.66  0.60 - 1.10 mg/dL Final     Calcium 04/27/2023 9.6  8.5 - 10.5 mg/dL Final     Glucose 04/27/2023 93  70 - 125 mg/dL Final     GFR Estimate 04/27/2023 >90  >60 mL/min/1.73m2 Final    eGFR calculated using 2021 CKD-EPI equation.     CRP 04/27/2023 1.5 (H)  0.0 - <0.8 mg/dL Final     WBC Count 04/27/2023 10.9  4.0 - 11.0 10e3/uL Final     RBC Count 04/27/2023 4.68  3.80 - 5.20 10e6/uL Final     Hemoglobin 04/27/2023 12.2  11.7 - 15.7 g/dL Final     Hematocrit 04/27/2023 38.0  35.0 - 47.0 % Final     MCV 04/27/2023 81  78 - 100 fL Final     MCH 04/27/2023 26.1 (L)  26.5 - 33.0 pg Final     MCHC 04/27/2023 32.1  31.5 - 36.5 g/dL Final     RDW 04/27/2023 13.9  10.0 - 15.0 % Final     Platelet Count 04/27/2023 319  150 - 450 10e3/uL Final     % Neutrophils 04/27/2023 63  % Final     % Lymphocytes 04/27/2023 29  % Final     % Monocytes 04/27/2023 4  % Final     % Eosinophils 04/27/2023 3  % Final     % Basophils 04/27/2023 1  % Final     % Immature Granulocytes  04/27/2023 0  % Final     NRBCs per 100 WBC 04/27/2023 0  <1 /100 Final     Absolute Neutrophils 04/27/2023 6.9  1.6 - 8.3 10e3/uL Final     Absolute Lymphocytes 04/27/2023 3.1  0.8 - 5.3 10e3/uL Final     Absolute Monocytes 04/27/2023 0.5  0.0 - 1.3 10e3/uL Final     Absolute Eosinophils 04/27/2023 0.3  0.0 - 0.7 10e3/uL Final     Absolute Basophils 04/27/2023 0.1  0.0 - 0.2 10e3/uL Final     Absolute Immature Granulocytes 04/27/2023 0.0  <=0.4 10e3/uL Final     Absolute NRBCs 04/27/2023 0.0  10e3/uL Final

## 2023-05-04 LAB — BACTERIA UR CULT: NO GROWTH

## 2023-05-12 ENCOUNTER — OFFICE VISIT (OUTPATIENT)
Dept: FAMILY MEDICINE | Facility: CLINIC | Age: 28
End: 2023-05-12
Payer: COMMERCIAL

## 2023-05-12 ENCOUNTER — HOSPITAL ENCOUNTER (OUTPATIENT)
Dept: ULTRASOUND IMAGING | Facility: CLINIC | Age: 28
Discharge: HOME | End: 2023-05-12
Attending: FAMILY MEDICINE | Admitting: FAMILY MEDICINE
Payer: COMMERCIAL

## 2023-05-12 ENCOUNTER — TELEPHONE (OUTPATIENT)
Dept: FAMILY MEDICINE | Facility: CLINIC | Age: 28
End: 2023-05-12

## 2023-05-12 VITALS
WEIGHT: 196.75 LBS | TEMPERATURE: 98.5 F | OXYGEN SATURATION: 98 % | HEART RATE: 78 BPM | DIASTOLIC BLOOD PRESSURE: 75 MMHG | RESPIRATION RATE: 16 BRPM | HEIGHT: 63 IN | SYSTOLIC BLOOD PRESSURE: 110 MMHG | BODY MASS INDEX: 34.86 KG/M2

## 2023-05-12 DIAGNOSIS — Z32.01 PREGNANCY TEST POSITIVE: ICD-10-CM

## 2023-05-12 DIAGNOSIS — R10.2 PELVIC PAIN IN FEMALE: ICD-10-CM

## 2023-05-12 DIAGNOSIS — N92.6 MISSED PERIOD: ICD-10-CM

## 2023-05-12 DIAGNOSIS — B96.89 BACTERIAL VAGINOSIS IN PREGNANCY: Primary | ICD-10-CM

## 2023-05-12 DIAGNOSIS — O23.599 BACTERIAL VAGINOSIS IN PREGNANCY: Primary | ICD-10-CM

## 2023-05-12 DIAGNOSIS — Z87.440 H/O URINARY TRACT INFECTION: ICD-10-CM

## 2023-05-12 DIAGNOSIS — Z87.440 H/O URINARY TRACT INFECTION: Primary | ICD-10-CM

## 2023-05-12 PROBLEM — K80.20 SYMPTOMATIC CHOLELITHIASIS: Status: RESOLVED | Noted: 2020-09-04 | Resolved: 2023-05-12

## 2023-05-12 PROBLEM — O26.649 CHOLESTASIS DURING PREGNANCY: Status: RESOLVED | Noted: 2018-06-08 | Resolved: 2023-05-12

## 2023-05-12 LAB
ALBUMIN UR-MCNC: NEGATIVE MG/DL
APPEARANCE UR: CLEAR
BACTERIA #/AREA URNS HPF: ABNORMAL /HPF
BILIRUB UR QL STRIP: NEGATIVE
CLUE CELLS: PRESENT
COLOR UR AUTO: YELLOW
GLUCOSE UR STRIP-MCNC: NEGATIVE MG/DL
HCG UR QL: POSITIVE
HGB UR QL STRIP: NEGATIVE
KETONES UR STRIP-MCNC: NEGATIVE MG/DL
LEUKOCYTE ESTERASE UR QL STRIP: ABNORMAL
NITRATE UR QL: NEGATIVE
PH UR STRIP: 6 [PH] (ref 5–7)
RBC #/AREA URNS AUTO: ABNORMAL /HPF
SP GR UR STRIP: 1.02 (ref 1–1.03)
SQUAMOUS #/AREA URNS AUTO: ABNORMAL /LPF
TRICHOMONAS, WET PREP: ABNORMAL
UROBILINOGEN UR STRIP-ACNC: 0.2 E.U./DL
WBC #/AREA URNS AUTO: ABNORMAL /HPF
WBC'S/HIGH POWER FIELD, WET PREP: ABNORMAL
YEAST #/AREA URNS HPF: ABNORMAL /HPF
YEAST, WET PREP: ABNORMAL

## 2023-05-12 PROCEDURE — 87491 CHLMYD TRACH DNA AMP PROBE: CPT | Performed by: FAMILY MEDICINE

## 2023-05-12 PROCEDURE — 76801 OB US < 14 WKS SINGLE FETUS: CPT

## 2023-05-12 PROCEDURE — 87210 SMEAR WET MOUNT SALINE/INK: CPT | Performed by: FAMILY MEDICINE

## 2023-05-12 PROCEDURE — 81025 URINE PREGNANCY TEST: CPT | Performed by: FAMILY MEDICINE

## 2023-05-12 PROCEDURE — 99214 OFFICE O/P EST MOD 30 MIN: CPT | Performed by: FAMILY MEDICINE

## 2023-05-12 PROCEDURE — 87591 N.GONORRHOEAE DNA AMP PROB: CPT | Performed by: FAMILY MEDICINE

## 2023-05-12 PROCEDURE — 81001 URINALYSIS AUTO W/SCOPE: CPT | Performed by: FAMILY MEDICINE

## 2023-05-12 RX ORDER — PRENATAL VIT/IRON FUM/FOLIC AC 27MG-0.8MG
1 TABLET ORAL DAILY
Qty: 90 TABLET | Refills: 3 | COMMUNITY
Start: 2023-05-12 | End: 2023-06-14

## 2023-05-12 RX ORDER — PRENATAL VIT/IRON FUM/FOLIC AC 27MG-0.8MG
1 TABLET ORAL DAILY
Qty: 90 TABLET | Refills: 3 | Status: SHIPPED | OUTPATIENT
Start: 2023-05-12 | End: 2023-05-12

## 2023-05-12 RX ORDER — CLINDAMYCIN HCL 300 MG
300 CAPSULE ORAL 2 TIMES DAILY
Qty: 14 CAPSULE | Refills: 0 | Status: SHIPPED | OUTPATIENT
Start: 2023-05-12 | End: 2023-05-19

## 2023-05-12 NOTE — TELEPHONE ENCOUNTER
Patient returns call with help of [employer]. Writer relay RN/provider's message below. Caller verbalizes understanding and has no further questions.     Bhavana Bermeo,   [facility]  May 12, 2023 4:17 PM

## 2023-05-12 NOTE — TELEPHONE ENCOUNTER
Writer attempt #1 to call patient with the help of a  regarding provider's message below. No answer, left non-detailed VM with call back number.    If patient calls back, please relay Dr. Rose & Dr. Drummond's  Message below to patient. Thanks.    Maria Teresa Penn, LEOPOLDON, RN   [facility]

## 2023-05-12 NOTE — TELEPHONE ENCOUNTER
Please include in message that patient's ultrasound was normal and reassuring. She is about 8 weeks pregnant.     There is a tiny area of bleeding called a subchorionic hemorrhage, but this generally does not cause any problems. I do recommend that she NOT have sex until she has another ultrasound to check on this.     Ioana Drummond MD

## 2023-05-12 NOTE — PROGRESS NOTES
OFFICE VISIT    Assessment/Plan:     Patient Instructions:    -Complete the labs as ordered.  -See the specialty  to make an appointment for the ultrasound.  Dr. Rose recommends to complete this within the next 1-2 days.    -Start taking prenatal vitamins once daily.  -Do not take any medications unless prescribed by your doctor.  -Avoid any use of alcohol, tobacco/cigarettes, or any recreational drugs as these can cause harm to the unborn child.  -Try to follow a balanced diet with lots of fruits and vegetables.  -Be sure to stay well-hydrated.    -Make an appointment to see the OB doctor for your first OB visit.     Please seek immediate medical attention (go to the emergency room or urgent care) for the following reasons: worsening symptoms, vaginal bleeding, dizziness, lightheadedness, fevers, chills, new back pains or any concerning changes.    Please return to clinic to see the midwife as you have scheduled, or sooner as needed.      Vernell was seen today for recurring uti.  Diagnoses and all orders for this visit:    H/O urinary tract infection  Pelvic pain in female  Missed period  Pregnancy test positive: Patient noted to have a positive UPT today.  Patient is about 8 weeks along at this time.  Urinalysis was nonconcerning for urinary tract infection.  Pyelonephritis likely resolved at this point.  There are some yeast noted in the urine, though the urine sample appears to be a dirty catch.  Considering if patient has needs vaginal infection.  Also want to rule out BV.  Plan for wet prep as below.  Also check for gonorrhea and Chlamydia testing.  As patient is having some pelvic discomfort over the midline and bilaterally with abdominal cramping, plan to complete a pelvic ultrasound to assess for potential ectopic pregnancy.  Vital stable at this time.  No indication for higher acuity of care at this time.  Patient brought to specialty  to make an appointment for the ultrasound to be  completed within the next day or 2.  -     UA Macroscopic with reflex to Microscopic and Culture  -     UA Microscopic with Reflex to Culture  -     NEISSERIA GONORRHOEA PCR; Future  -     CHLAMYDIA TRACHOMATIS PCR; Future  -     US Pelvic Complete with Transvaginal; Future  -     Wet prep - Clinic Collect  -     HCG qualitative urine; Future      The diagnoses, treatment options, risk, benefits, and recommendations were reviewed with patient/guardian.  Questions were answered to patient's/guardian satisfaction.  Red flag signs were reviewed.  Patient/guardian is in agreement with above plan.      Subjective: 27 year old female with history of insulin resistance, obesity, status postcholecystectomy in 09/2020 who presents to clinic for the following complaints:   Patient presents with:  recurring UTI    Answers for HPI/ROS submitted by the patient on 5/12/2023  What is the reason for your visit today? : pregnancy check  How many servings of fruits and vegetables do you eat daily?: 2-3  On average, how many sweetened beverages do you drink each day (Examples: soda, juice, sweet tea, etc.  Do NOT count diet or artificially sweetened beverages)?: 1  How many minutes a day do you exercise enough to make your heart beat faster?: 20 to 29  How many days a week do you exercise enough to make your heart beat faster?: 4  How many days per week do you miss taking your medication?: 0    Patient thinks she may have a urinary tract infection. Two weeks ago, she was having pain in the pelvic area and she was told that it was the urinary tract infeciotn. She was diagnosed with acute pyelonephritis and treated with cefdinir 300 mg twice daily for 10 days.  Since then, she feels better, but she still has the pain in the pelvic area.  Her main complaint is that she feels pressure in the pelvic area and in the lower back area as well.  Denies fevers, chills.    When she was sleeping, she sometimes chokes on her saliva. This happens a  "month ago and would happen 2-3 times a week. It is has been gone since then. Denies swallowing or breathing issues.  Discussed monitoring.    She has some nausea and vomiting symptoms, but wonders if it is related to her being pregnant.    Reviewed UA with her.  This looks to be more consistent with a dirty catch.  Not consistent with a urinary tract infection.  Of note, there is budding yeast noted.      LMP was 3/5/2023.  UPT test today was positive.  Reviewed with patient. She has been feeling really tired. Some cramping sensations happens sometimes, though intense and comes and goes.  Patient denies any abdominal cramping, vaginal bleeding, vaginal discharge, fatigue, fevers, chills, dizziness, lightheadedness, feeling unwell.  She otherwise feels at baseline. She has prenatal vitamins that she is taking already.    A professional [place] telephone  was utilized for the office visit.     The 10 point review of system is negative except as stated in the HPI.    Allergies were reviewed and updated.    Objective:   /75   Pulse 78   Temp 98.5  F (36.9  C) (Oral)   Resp 16   Ht 1.6 m (5' 3\")   Wt 89.2 kg (196 lb 12 oz)   LMP 03/05/2023   SpO2 98%   BMI 34.85 kg/m    General: Active, alert, nontoxic-appearing.  No acute distress.  HEENT: Normocephalic, atraumatic.  Pupils are equal and round.  Sclera is clear.  Normal external ears. Nares patent.  Moist mucous membranes.    Cardiac: RRR.  S1, S2 present.  No murmurs, rubs, or gallops.  Respiratory/chest: Clear to auscultation bilaterally.  No wheezes, rales, rhonchi.  Breathing is not labored.  No accessory muscle usage.  Back: No CVA tenderness to percussion.  Abdomen: Soft, nondistended, nontender.  No masses or organomegaly noted.  No guarding or rebound tenderness appreciated.  Patient endorsing discomforts to palpation of the pelvic region at the midline and bilaterally.  Extremities: Voluntary movements intact.  Integumentary: No " concerning rash or skin changes appreciated.        Wellington Rose MD  [facility] [place] [address]  Phone: [phone]  Fax: [phone]    5/12/2023  9:08 AM            No current outpatient medications on file.     No current facility-administered medications for this visit.       No Known Allergies    Patient Active Problem List    Diagnosis Date Noted     Morbid obesity (H) 05/02/2023     Priority: Medium     Symptomatic cholelithiasis 09/04/2020     Priority: Medium     Drug-induced weight gain 07/22/2020     Priority: Medium     Nexplanon hx X2 with 15-20kg weight gain each time.          Insulin resistance 07/22/2020     Priority: Medium     6% in March 2020, down to 5.8% June 2020 w/ metformin use. Starting weight   loss program July 2020.          Class 2 obesity 06/22/2020     Priority: Medium     Cholestasis during pregnancy 06/08/2018     Priority: Medium       Family History   Problem Relation Age of Onset     Hypertension Maternal Grandmother      Diabetes Maternal Grandmother      Diabetes Maternal Grandfather      Diabetes Paternal Grandmother      Diabetes Maternal Aunt      Diabetes Maternal Aunt        Past Surgical History:   Procedure Laterality Date     LAPAROSCOPIC CHOLECYSTECTOMY N/A 9/4/2020    Procedure: CHOLECYSTECTOMY, LAPAROSCOPIC;  Surgeon: Joao Willett MD;  Location: [facility];  Service: General     NO PAST SURGERIES          Social History     Socioeconomic History     Marital status:      Spouse name: Not on file     Number of children: Not on file     Years of education: Not on file     Highest education level: Not on file   Occupational History     Not on file   Tobacco Use     Smoking status: Never     Passive exposure: Never     Smokeless tobacco: Never   Vaping Use     Vaping status: Never Used   Substance and Sexual Activity     Alcohol use: No     Drug use: No     Sexual activity: Yes     Partners: Male     Birth  control/protection: None   Other Topics Concern     Not on file   Social History Narrative    6/17/20  with 4 children. Algerian speaking     Social Determinants of Health     Financial Resource Strain: Not on file   Food Insecurity: Not on file   Transportation Needs: Not on file   Physical Activity: Not on file   Stress: Not on file   Social Connections: Not on file   Intimate Partner Violence: Not on file   Housing Stability: Not on file

## 2023-05-12 NOTE — TELEPHONE ENCOUNTER
Team - please call patient with results.  Medication sent to the following pharmacy: [id number] [address]    [place]re[place] Vernell Balos,    I hope you have been well since our last visit. Below are the results from the testing completed at the visit.     The wet prep test shows that you have bacterial vaginosis.  Dr. Rose has sent a prescription called clindamycin to the pharmacy for you to treat for bacterial vaginosis.  Clindamycin is typically utilized in pregnant women for various purposes.    The other the labs are in process and we will reach out to you once those are completed.    Dr. Rose recommends that you continue on the plan as discussed in clinic.    If there are any questions or concerns, please call the clinic or schedule an appointment for follow up.     Best wishes,           Wellington Rose MD  [facility]  5/12/2023  12:15 PM      Diagnoses and all orders for this visit:    Bacterial vaginosis in pregnancy  -     clindamycin (CLEOCIN) 300 MG capsule; Take 1 capsule (300 mg) by mouth 2 times daily for 7 days

## 2023-05-12 NOTE — PATIENT INSTRUCTIONS
-Thank you for choosing the [facility].  -It was a pleasure to see you today.  -Please take a look at the information below for more specific details regarding the treatment plan and recommendations.  -In this after visit summary is a list of your medications and specific instructions.  Please review this carefully as there may be changes made to your medication list.  -If there are any particular questions or concerns, please feel free to reach out to Dr. Rose.  -If any labs have been completed, we will reach out to you about results.  If the results are normal or not concerning, a letter or MyChart message will be sent to you.  If any follow-up is needed, either Dr. Rose or the nurse will give you a call.  If you have not heard regarding results after 2 weeks, please reach out to the clinic.    Patient Instructions:    -Complete the labs as ordered.  -See the specialty  to make an appointment for the ultrasound.  Dr. Rose recommends to complete this within the next 1-2 days.    -Start taking prenatal vitamins once daily.  -Do not take any medications unless prescribed by your doctor.  -Avoid any use of alcohol, tobacco/cigarettes, or any recreational drugs as these can cause harm to the unborn child.  -Try to follow a balanced diet with lots of fruits and vegetables.  -Be sure to stay well-hydrated.    -Make an appointment to see the OB doctor for your first OB visit.     Please seek immediate medical attention (go to the emergency room or urgent care) for the following reasons: worsening symptoms, vaginal bleeding, dizziness, lightheadedness, fevers, chills, new back pains or any concerning changes.    Please return to clinic to see the midwife as you have scheduled, or sooner as needed.      --------------------------------------------------------------------------------------------------------------------    -We are always looking for ways to improve.  You may be selected to  receive a survey regarding your visit today.  We encourage you to complete the survey and provide specific, constructive feedback to help us improve our processes.  Thank you for your time!  -Please review the contact information listed on the after visit summary and in the electronic chart.  Below is the phone number that we have on file.  If there are any changes that are needed to be made, please reach out to the clinic.  [phone] (home)

## 2023-05-13 LAB
C TRACH DNA SPEC QL NAA+PROBE: NEGATIVE
N GONORRHOEA DNA SPEC QL NAA+PROBE: NEGATIVE

## 2023-05-16 ENCOUNTER — TELEPHONE (OUTPATIENT)
Dept: FAMILY MEDICINE | Facility: CLINIC | Age: 28
End: 2023-05-16
Payer: COMMERCIAL

## 2023-05-16 NOTE — TELEPHONE ENCOUNTER
Called patient with assistance of an  to inform patient of test result below.  No further action needed.    LEOPOLDO SantiagoN, RN  [facility]      Wellington Rose MD [facility] [place]  Team - please call patient with results.     Thong Vernellkaur Christensen,     I hope you have been well since our last visit. Below are the results from the testing completed at the visit.     The testing shows that you do not have gonorrhea or chlamydia.  This is good news.  This completes the lab testing.     Dr. Rose recommends that you continue on the plan as discussed in clinic and from the doctor who spoke with.  On Friday.     If there are any questions or concerns, please call the clinic or schedule an appointment for follow up.     Best wishes,     Wellington Rose MD   [facility]   5/15/2023  9:55 AM

## 2023-05-16 NOTE — TELEPHONE ENCOUNTER
----- Message from Wellington Rose MD sent at 5/15/2023  9:56 AM CDT -----  Team - please call patient with results.    Thong Christensen,    I hope you have been well since our last visit. Below are the results from the testing completed at the visit.     The testing shows that you do not have gonorrhea or chlamydia.  This is good news.  This completes the lab testing.    Dr. Rose recommends that you continue on the plan as discussed in clinic and from the doctor who spoke with.  On Friday.    If there are any questions or concerns, please call the clinic or schedule an appointment for follow up.     Best wishes,           Wellington Rose MD  [facility]  5/15/2023  9:55 AM

## 2023-06-12 ENCOUNTER — HOSPITAL ENCOUNTER (EMERGENCY)
Facility: CLINIC | Age: 28
Discharge: HOME | End: 2023-06-13
Attending: EMERGENCY MEDICINE | Admitting: EMERGENCY MEDICINE
Payer: COMMERCIAL

## 2023-06-12 ENCOUNTER — APPOINTMENT (OUTPATIENT)
Dept: ULTRASOUND IMAGING | Facility: CLINIC | Age: 28
End: 2023-06-12
Attending: EMERGENCY MEDICINE
Payer: COMMERCIAL

## 2023-06-12 ENCOUNTER — HOSPITAL ENCOUNTER (EMERGENCY)
Facility: CLINIC | Age: 28
Discharge: HOME | End: 2023-06-12
Attending: EMERGENCY MEDICINE | Admitting: EMERGENCY MEDICINE
Payer: COMMERCIAL

## 2023-06-12 VITALS
RESPIRATION RATE: 16 BRPM | HEART RATE: 78 BPM | OXYGEN SATURATION: 98 % | TEMPERATURE: 98 F | DIASTOLIC BLOOD PRESSURE: 61 MMHG | WEIGHT: 200 LBS | SYSTOLIC BLOOD PRESSURE: 111 MMHG | HEIGHT: 62 IN | BODY MASS INDEX: 36.8 KG/M2

## 2023-06-12 DIAGNOSIS — O20.0 THREATENED MISCARRIAGE: ICD-10-CM

## 2023-06-12 DIAGNOSIS — R10.13 EPIGASTRIC PAIN: ICD-10-CM

## 2023-06-12 DIAGNOSIS — K21.00 GASTROESOPHAGEAL REFLUX DISEASE WITH ESOPHAGITIS WITHOUT HEMORRHAGE: ICD-10-CM

## 2023-06-12 DIAGNOSIS — O21.0 HYPEREMESIS GRAVIDARUM: ICD-10-CM

## 2023-06-12 LAB
ALBUMIN SERPL-MCNC: 3.4 G/DL (ref 3.5–5)
ALBUMIN UR-MCNC: 10 MG/DL
ALP SERPL-CCNC: 88 U/L (ref 45–120)
ALT SERPL W P-5'-P-CCNC: 18 U/L (ref 0–45)
ANION GAP SERPL CALCULATED.3IONS-SCNC: 9 MMOL/L (ref 5–18)
APPEARANCE UR: CLEAR
AST SERPL W P-5'-P-CCNC: 16 U/L (ref 0–40)
BACTERIA #/AREA URNS HPF: ABNORMAL /HPF
BASOPHILS # BLD AUTO: 0 10E3/UL (ref 0–0.2)
BASOPHILS NFR BLD AUTO: 1 %
BILIRUB DIRECT SERPL-MCNC: 0.1 MG/DL
BILIRUB SERPL-MCNC: 0.4 MG/DL (ref 0–1)
BILIRUB UR QL STRIP: NEGATIVE
BUN SERPL-MCNC: 5 MG/DL (ref 8–22)
CALCIUM SERPL-MCNC: 9.1 MG/DL (ref 8.5–10.5)
CHLORIDE BLD-SCNC: 104 MMOL/L (ref 98–107)
CO2 SERPL-SCNC: 24 MMOL/L (ref 22–31)
COLOR UR AUTO: YELLOW
CREAT SERPL-MCNC: 0.64 MG/DL (ref 0.6–1.1)
EOSINOPHIL # BLD AUTO: 0.2 10E3/UL (ref 0–0.7)
EOSINOPHIL NFR BLD AUTO: 2 %
ERYTHROCYTE [DISTWIDTH] IN BLOOD BY AUTOMATED COUNT: 13.5 % (ref 10–15)
GFR SERPL CREATININE-BSD FRML MDRD: >90 ML/MIN/1.73M2
GLUCOSE BLD-MCNC: 88 MG/DL (ref 70–125)
GLUCOSE UR STRIP-MCNC: NEGATIVE MG/DL
HCT VFR BLD AUTO: 36.5 % (ref 35–47)
HGB BLD-MCNC: 11.8 G/DL (ref 11.7–15.7)
HGB UR QL STRIP: NEGATIVE
IMM GRANULOCYTES # BLD: 0 10E3/UL
IMM GRANULOCYTES NFR BLD: 0 %
KETONES UR STRIP-MCNC: NEGATIVE MG/DL
LEUKOCYTE ESTERASE UR QL STRIP: ABNORMAL
LYMPHOCYTES # BLD AUTO: 2.2 10E3/UL (ref 0.8–5.3)
LYMPHOCYTES NFR BLD AUTO: 25 %
MCH RBC QN AUTO: 26.8 PG (ref 26.5–33)
MCHC RBC AUTO-ENTMCNC: 32.3 G/DL (ref 31.5–36.5)
MCV RBC AUTO: 83 FL (ref 78–100)
MONOCYTES # BLD AUTO: 0.4 10E3/UL (ref 0–1.3)
MONOCYTES NFR BLD AUTO: 5 %
MUCOUS THREADS #/AREA URNS LPF: PRESENT /LPF
NEUTROPHILS # BLD AUTO: 5.9 10E3/UL (ref 1.6–8.3)
NEUTROPHILS NFR BLD AUTO: 67 %
NITRATE UR QL: NEGATIVE
NRBC # BLD AUTO: 0 10E3/UL
NRBC BLD AUTO-RTO: 0 /100
PH UR STRIP: 7 [PH] (ref 5–7)
PLATELET # BLD AUTO: 278 10E3/UL (ref 150–450)
POTASSIUM BLD-SCNC: 3.6 MMOL/L (ref 3.5–5)
PROT SERPL-MCNC: 7.2 G/DL (ref 6–8)
RBC # BLD AUTO: 4.41 10E6/UL (ref 3.8–5.2)
RBC URINE: 1 /HPF
SODIUM SERPL-SCNC: 137 MMOL/L (ref 136–145)
SP GR UR STRIP: 1.02 (ref 1–1.03)
SQUAMOUS EPITHELIAL: 3 /HPF
UROBILINOGEN UR STRIP-MCNC: <2 MG/DL
WBC # BLD AUTO: 8.7 10E3/UL (ref 4–11)
WBC URINE: 5 /HPF

## 2023-06-12 PROCEDURE — 87086 URINE CULTURE/COLONY COUNT: CPT | Performed by: EMERGENCY MEDICINE

## 2023-06-12 PROCEDURE — 250N000011 HC RX IP 250 OP 636: Performed by: EMERGENCY MEDICINE

## 2023-06-12 PROCEDURE — 84702 CHORIONIC GONADOTROPIN TEST: CPT | Performed by: EMERGENCY MEDICINE

## 2023-06-12 PROCEDURE — 36415 COLL VENOUS BLD VENIPUNCTURE: CPT | Performed by: EMERGENCY MEDICINE

## 2023-06-12 PROCEDURE — 81001 URINALYSIS AUTO W/SCOPE: CPT | Performed by: EMERGENCY MEDICINE

## 2023-06-12 PROCEDURE — 82310 ASSAY OF CALCIUM: CPT | Performed by: EMERGENCY MEDICINE

## 2023-06-12 PROCEDURE — 96360 HYDRATION IV INFUSION INIT: CPT

## 2023-06-12 PROCEDURE — 82248 BILIRUBIN DIRECT: CPT | Performed by: EMERGENCY MEDICINE

## 2023-06-12 PROCEDURE — 96375 TX/PRO/DX INJ NEW DRUG ADDON: CPT

## 2023-06-12 PROCEDURE — 85025 COMPLETE CBC W/AUTO DIFF WBC: CPT | Performed by: EMERGENCY MEDICINE

## 2023-06-12 PROCEDURE — 99283 EMERGENCY DEPT VISIT LOW MDM: CPT | Mod: 25,27

## 2023-06-12 PROCEDURE — 258N000003 HC RX IP 258 OP 636: Performed by: EMERGENCY MEDICINE

## 2023-06-12 PROCEDURE — 96374 THER/PROPH/DIAG INJ IV PUSH: CPT

## 2023-06-12 PROCEDURE — 76801 OB US < 14 WKS SINGLE FETUS: CPT

## 2023-06-12 PROCEDURE — 99284 EMERGENCY DEPT VISIT MOD MDM: CPT | Mod: 25

## 2023-06-12 PROCEDURE — 96361 HYDRATE IV INFUSION ADD-ON: CPT

## 2023-06-12 RX ORDER — ONDANSETRON 4 MG/1
4 TABLET, ORALLY DISINTEGRATING ORAL ONCE
Status: COMPLETED | OUTPATIENT
Start: 2023-06-12 | End: 2023-06-12

## 2023-06-12 RX ORDER — METOCLOPRAMIDE HYDROCHLORIDE 5 MG/ML
5 INJECTION INTRAMUSCULAR; INTRAVENOUS ONCE
Status: COMPLETED | OUTPATIENT
Start: 2023-06-12 | End: 2023-06-12

## 2023-06-12 RX ORDER — ONDANSETRON 4 MG/1
4 TABLET, ORALLY DISINTEGRATING ORAL EVERY 8 HOURS PRN
Qty: 10 TABLET | Refills: 0 | Status: SHIPPED | OUTPATIENT
Start: 2023-06-12 | End: 2023-06-14

## 2023-06-12 RX ORDER — SODIUM CHLORIDE 9 MG/ML
INJECTION, SOLUTION INTRAVENOUS CONTINUOUS
Status: DISCONTINUED | OUTPATIENT
Start: 2023-06-13 | End: 2023-06-13 | Stop reason: HOSPADM

## 2023-06-12 RX ADMIN — SODIUM CHLORIDE, POTASSIUM CHLORIDE, SODIUM LACTATE AND CALCIUM CHLORIDE 1000 ML: 600; 310; 30; 20 INJECTION, SOLUTION INTRAVENOUS at 07:54

## 2023-06-12 RX ADMIN — METOCLOPRAMIDE HYDROCHLORIDE 5 MG: 5 INJECTION INTRAMUSCULAR; INTRAVENOUS at 23:32

## 2023-06-12 RX ADMIN — ONDANSETRON 4 MG: 4 TABLET, ORALLY DISINTEGRATING ORAL at 07:51

## 2023-06-12 RX ADMIN — SODIUM CHLORIDE 1000 ML: 9 INJECTION, SOLUTION INTRAVENOUS at 23:36

## 2023-06-12 ASSESSMENT — ENCOUNTER SYMPTOMS
VOMITING: 0
ABDOMINAL PAIN: 1
NAUSEA: 1

## 2023-06-12 ASSESSMENT — ACTIVITIES OF DAILY LIVING (ADL): ADLS_ACUITY_SCORE: 35

## 2023-06-12 NOTE — ED PROVIDER NOTES
EMERGENCY DEPARTMENT ENCOUNTER      NAME: Vernell Christensen  AGE: 28 year old female  YOB: 1995  MRN: [id number]  EVALUATION DATE & TIME: 2023  7:24 AM    PCP: No Ref-Primary, Physician    ED PROVIDER: Chiki Bhakta D.O.      Chief Complaint   Patient presents with     Abdominal Pain     Nausea & Vomiting       FINAL IMPRESSION:  1. Hyperemesis gravidarum        ED COURSE & MEDICAL DECISION MAKIN:43 AM I met with the patient to gather history and to perform my initial exam. I discussed the plan for care while in the Emergency Department.    9:45 AM I reassessed patient. She is feeling better and is comfortable with plan for discharge.         Pertinent Labs & Imaging studies reviewed. (See chart for details)  28 year old female presents to the Emergency Department for evaluation of pregnancy.  Patient does report some quadrant tenderness, resolved after IV fluids, patient is already status post cholecystectomy.  As it did resolve with IV fluids, I do not think this represent emergent process.  Patient was borderline hypotensive in the emergency department, however she is only eating, with normal LFTs, I do not believe this to represent preeclampsia.  Symptoms have dramatically improved after Zofran, she is now tolerant to oral intake.  At this time, I do not see any evidence of infection including evidence of UTI.  Therefore I do believe patient be safely discharged home with outpatient follow-up with primary care provider or OB/GYN.  Return precautions were discussed.    Medical Decision Making    History:    Supplemental history from: Documented in chart, if applicable    External Record(s) reviewed: Documented in chart, if applicable.    Work Up:    Chart documentation includes differential considered and any EKGs or imaging independently interpreted by provider, where specified.    In additional to work up documented, I considered the following work up: Documented in chart, if  applicable.    External consultation:    Discussion of management with another provider: Documented in chart, if applicable    Complicating factors:    Care impacted by chronic illness: Diabetes    Care affected by social determinants of health: N/A    Disposition considerations: Discharge. I prescribed additional prescription strength medication(s) as charted. I considered admission, but discharged patient after significant clinical improvement.        At the conclusion of the encounter I discussed the results of all of the tests and the disposition. The questions were answered. The patient or family acknowledged understanding and was agreeable with the care plan.        HPI    Patient information was obtained from: Patient    Use of : N/A       Vernell Christensen is a 28 year old female with a pertinent history of diabetes who presents via walk-in for evaluation of vomiting.    Patient endorses nausea and vomiting with her pregnancy that has become worse over the past 2 days, saying she is unable to keep anything down. She also endorses RUQ abdominal pain that is non radiating. Patient notes that she had similar pain prior to her cholecystectomy.     Patient reports she is G5, about 12 weeks pregnant. She denies tobacco and alcohol use. She denies any allergies to medications.    Patient denies fever, shortness of breath, cough, congestion, sore throat, dysuria, hematuria, and all other complaints at this time.        REVIEW OF SYSTEMS  Constitutional:  Denies fever, chills, weight loss or weakness  Eyes:  No pain, discharge, redness  HENT:  Denies sore throat, ear pain, congestion  Respiratory: No SOB, wheeze or cough  Cardiovascular:  No CP, palpitations  GI:  Denies diarrhea. Positive for nausea, vomiting, RUQ abdominal pain  : Denies dysuria, hematuria  Musculoskeletal:  Denies any new muscle/joint pain, swelling or loss of function.  Skin:  Denies rash, pallor  Neurologic:  Denies headache, focal  weakness or sensory changes  Lymph: Denies swollen nodes    All other systems negative unless noted in HPI.    PAST MEDICAL HISTORY:  Past Medical History:   Diagnosis Date     Cholestasis during pregnancy 6/8/2018     Diabetes mellitus (H)      Symptomatic cholelithiasis 9/4/2020       PAST SURGICAL HISTORY:  Past Surgical History:   Procedure Laterality Date     LAPAROSCOPIC CHOLECYSTECTOMY N/A 9/4/2020    Procedure: CHOLECYSTECTOMY, LAPAROSCOPIC;  Surgeon: Joao Willett MD;  Location: [facility];  Service: General     NO PAST SURGERIES           CURRENT MEDICATIONS:    No current facility-administered medications for this encounter.     Current Outpatient Medications   Medication     ondansetron (ZOFRAN ODT) 4 MG ODT tab     Prenatal Vit-Fe Fumarate-FA (PRENATAL MULTIVITAMIN W/IRON) 27-0.8 MG tablet         ALLERGIES:  No Known Allergies    FAMILY HISTORY:  Family History   Problem Relation Age of Onset     Hypertension Maternal Grandmother      Diabetes Maternal Grandmother      Diabetes Maternal Grandfather      Diabetes Paternal Grandmother      Diabetes Maternal Aunt      Diabetes Maternal Aunt        SOCIAL HISTORY:  Social History     Socioeconomic History     Marital status:      Spouse name: None     Number of children: None     Years of education: None     Highest education level: None   Tobacco Use     Smoking status: Never     Passive exposure: Never     Smokeless tobacco: Never   Vaping Use     Vaping status: Never Used   Substance and Sexual Activity     Alcohol use: No     Drug use: No     Sexual activity: Yes     Partners: Male     Birth control/protection: None   Social History Narrative    6/17/20  with 4 children. U[place] speaking       VITALS:  Patient Vitals for the past 24 hrs:   BP Temp Temp src Pulse Resp SpO2 Height Weight   06/12/23 1000 111/61 -- -- 78 -- 98 % -- --   06/12/23 0930 105/57 -- -- 58 -- 98 % -- --   06/12/23 0900 114/65 -- -- 71 -- 98 % -- --  "  06/12/23 0801 121/73 -- -- 81 -- 99 % -- --   06/12/23 0800 -- -- -- 84 -- 99 % -- --   06/12/23 0745 -- -- -- 82 -- 99 % -- --   06/12/23 0730 -- -- -- 84 -- 100 % -- --   06/12/23 0729 138/86 98  F (36.7  C) Oral 82 16 100 % 1.575 m (5' 2\") 90.7 kg (200 lb)       PHYSICAL EXAM    VITAL SIGNS: /61   Pulse 78   Temp 98  F (36.7  C) (Oral)   Resp 16   Ht 1.575 m (5' 2\")   Wt 90.7 kg (200 lb)   LMP 03/05/2023 (Exact Date)   SpO2 98%   BMI 36.58 kg/m      General Appearance: Well-appearing, well-nourished, no acute distress   Head:  Normocephalic, without obvious abnormality, atraumatic  Eyes:  PERRL, conjunctiva/corneas clear, EOM's intact,  ENT:  Lips, mucosa, and tongue normal, membranes are moist without pallor  Neck:  Normal ROM, symmetrical, trachea midline    Chest:  No tenderness or deformity, no crepitus  Cardio:  Regular rate and rhythm, no murmur, rub or gallop, 2+ pulses symmetric in all extremities  Pulm:  Clear to auscultation bilaterally, respirations unlabored,  Back:  ROM normal, no CVA tenderness, no spinal tenderness, no paraspinal tenderness  Abdomen:  Soft, non-tender, no rebound or guarding.  Musculoskeletal: Full ROM, no edema, no cyanosis, good ROM of major joints  Integument:  Warm, Dry, No erythema, No rash.    Neurologic:  Alert & oriented.  No focal deficits appreciated.  Ambulatory.  Psychiatric:  Affect normal, Judgment normal, Mood normal.      LABS  Results for orders placed or performed during the hospital encounter of 06/12/23 (from the past 24 hour(s))   CBC with platelets + differential    Narrative    The following orders were created for panel order CBC with platelets + differential.  Procedure                               Abnormality         Status                     ---------                               -----------         ------                     CBC with platelets and d...[[id number]]                      Final result                 Please view results for " these tests on the individual orders.   Basic metabolic panel   Result Value Ref Range    Sodium 137 136 - 145 mmol/L    Potassium 3.6 3.5 - 5.0 mmol/L    Chloride 104 98 - 107 mmol/L    Carbon Dioxide (CO2) 24 22 - 31 mmol/L    Anion Gap 9 5 - 18 mmol/L    Urea Nitrogen 5 (L) 8 - 22 mg/dL    Creatinine 0.64 0.60 - 1.10 mg/dL    Calcium 9.1 8.5 - 10.5 mg/dL    Glucose 88 70 - 125 mg/dL    GFR Estimate >90 >60 mL/min/1.73m2   Hepatic function panel   Result Value Ref Range    Bilirubin Total 0.4 0.0 - 1.0 mg/dL    Bilirubin Direct 0.1 <=0.5 mg/dL    Protein Total 7.2 6.0 - 8.0 g/dL    Albumin 3.4 (L) 3.5 - 5.0 g/dL    Alkaline Phosphatase 88 45 - 120 U/L    AST 16 0 - 40 U/L    ALT 18 0 - 45 U/L   CBC with platelets and differential   Result Value Ref Range    WBC Count 8.7 4.0 - 11.0 10e3/uL    RBC Count 4.41 3.80 - 5.20 10e6/uL    Hemoglobin 11.8 11.7 - 15.7 g/dL    Hematocrit 36.5 35.0 - 47.0 %    MCV 83 78 - 100 fL    MCH 26.8 26.5 - 33.0 pg    MCHC 32.3 31.5 - 36.5 g/dL    RDW 13.5 10.0 - 15.0 %    Platelet Count 278 150 - 450 10e3/uL    % Neutrophils 67 %    % Lymphocytes 25 %    % Monocytes 5 %    % Eosinophils 2 %    % Basophils 1 %    % Immature Granulocytes 0 %    NRBCs per 100 WBC 0 <1 /100    Absolute Neutrophils 5.9 1.6 - 8.3 10e3/uL    Absolute Lymphocytes 2.2 0.8 - 5.3 10e3/uL    Absolute Monocytes 0.4 0.0 - 1.3 10e3/uL    Absolute Eosinophils 0.2 0.0 - 0.7 10e3/uL    Absolute Basophils 0.0 0.0 - 0.2 10e3/uL    Absolute Immature Granulocytes 0.0 <=0.4 10e3/uL    Absolute NRBCs 0.0 10e3/uL   UA with Microscopic reflex to Culture    Specimen: Urine, Midstream   Result Value Ref Range    Color Urine Yellow Colorless, Straw, Light Yellow, Yellow    Appearance Urine Clear Clear    Glucose Urine Negative Negative mg/dL    Bilirubin Urine Negative Negative    Ketones Urine Negative Negative mg/dL    Specific Gravity Urine 1.023 1.001 - 1.030    Blood Urine Negative Negative    pH Urine 7.0 5.0 - 7.0     Protein Albumin Urine 10 (A) Negative mg/dL    Urobilinogen Urine <2.0 <2.0 mg/dL    Nitrite Urine Negative Negative    Leukocyte Esterase Urine 250 Kadeem/uL (A) Negative    Bacteria Urine Few (A) None Seen /HPF    Mucus Urine Present (A) None Seen /LPF    RBC Urine 1 <=2 /HPF    WBC Urine 5 <=5 /HPF    Squamous Epithelials Urine 3 (H) <=1 /HPF    Narrative    Urine Culture ordered based on laboratory criteria         RADIOLOGY  No orders to display          MEDICATIONS GIVEN IN THE EMERGENCY:  Medications   lactated ringers BOLUS 1,000 mL (0 mLs Intravenous Stopped 6/12/23 0905)   ondansetron (ZOFRAN ODT) ODT tab 4 mg (4 mg Oral $Given 6/12/23 5011)       NEW PRESCRIPTIONS STARTED AT TODAY'S ER VISIT  Discharge Medication List as of 6/12/2023 10:14 AM      START taking these medications    Details   ondansetron (ZOFRAN ODT) 4 MG ODT tab Take 1 tablet (4 mg) by mouth every 8 hours as needed for nausea, Disp-10 tablet, R-0, Local Print              I, Louie Ceja, am serving as a scribe to document services personally performed by Chiki Bhakta D.O., based on my observations and the provider's statements to me.  I, Chiki Bhakta D.O., attest that Louie Ceja is acting in a scribe capacity, has observed my performance of the services and has documented them in accordance with my direction.     Chiki Bhakta D.O.  Emergency Medicine  [facility] EMERGENCY ROOM  [address] [id number] [phone]  Dept: [phone]      Chiki Bhakta,   06/12/23 1134

## 2023-06-12 NOTE — ED NOTES
Patient passed NPO challenge. Feeling better and thinks she is ready to discharge. Provider notified.

## 2023-06-12 NOTE — ED NOTES
Introduced self to patient. White board updated. Expected length of stay explained to patient. Patient has call light within reach. Side rails up. Pain assessed. Reports 2 days of nausea, vomiting and a headache. She thinks she is 12 weeks pregnant. Also reporting upper abdominal pain that she says is similar to when she has had her gallbladder attacks. Hx of cholecystomy. Has not tried any medications. .

## 2023-06-12 NOTE — ED TRIAGE NOTES
Pt presents with 2 weeks of nausea and vomiting but has significantly worsened over the past few days. Pt is about 12 weeks pregnant. Denies any vaginal bleeding. Endorses abdominal pain but only associated with the vomiting. Currently denies any abdominal pain     Triage Assessment     Row Name 06/12/23 0729       Triage Assessment (Adult)    Airway WDL WDL       Respiratory WDL    Respiratory WDL WDL       Skin Circulation/Temperature WDL    Skin Circulation/Temperature WDL WDL       Cardiac WDL    Cardiac WDL WDL       Peripheral/Neurovascular WDL    Peripheral Neurovascular WDL WDL       Cognitive/Neuro/Behavioral WDL    Cognitive/Neuro/Behavioral WDL WDL

## 2023-06-13 VITALS
TEMPERATURE: 97.6 F | DIASTOLIC BLOOD PRESSURE: 61 MMHG | RESPIRATION RATE: 18 BRPM | HEART RATE: 77 BPM | BODY MASS INDEX: 34.11 KG/M2 | HEIGHT: 64 IN | OXYGEN SATURATION: 98 % | WEIGHT: 199.8 LBS | SYSTOLIC BLOOD PRESSURE: 109 MMHG

## 2023-06-13 LAB — HCG SERPL-ACNC: ABNORMAL MLU/ML (ref 0–4)

## 2023-06-13 PROCEDURE — 96361 HYDRATE IV INFUSION ADD-ON: CPT

## 2023-06-13 PROCEDURE — 250N000013 HC RX MED GY IP 250 OP 250 PS 637: Performed by: EMERGENCY MEDICINE

## 2023-06-13 PROCEDURE — 250N000011 HC RX IP 250 OP 636: Performed by: EMERGENCY MEDICINE

## 2023-06-13 RX ORDER — METOCLOPRAMIDE 5 MG/1
5 TABLET ORAL 4 TIMES DAILY PRN
Qty: 20 TABLET | Refills: 0 | Status: SHIPPED | OUTPATIENT
Start: 2023-06-13

## 2023-06-13 RX ORDER — FAMOTIDINE 20 MG/1
20 TABLET, FILM COATED ORAL 2 TIMES DAILY
Qty: 14 TABLET | Refills: 0 | Status: SHIPPED | OUTPATIENT
Start: 2023-06-13 | End: 2023-06-20

## 2023-06-13 RX ORDER — MAGNESIUM HYDROXIDE/ALUMINUM HYDROXICE/SIMETHICONE 120; 1200; 1200 MG/30ML; MG/30ML; MG/30ML
15 SUSPENSION ORAL ONCE
Status: COMPLETED | OUTPATIENT
Start: 2023-06-13 | End: 2023-06-13

## 2023-06-13 RX ADMIN — FAMOTIDINE 20 MG: 10 INJECTION, SOLUTION INTRAVENOUS at 00:35

## 2023-06-13 RX ADMIN — ALUMINUM HYDROXIDE, MAGNESIUM HYDROXIDE, AND DIMETHICONE 15 ML: 200; 20; 200 SUSPENSION ORAL at 00:35

## 2023-06-13 NOTE — ED TRIAGE NOTES
Patient was seen earlier for N/V and abdominal pain, diagnosed with hyperemesis gravidarum, discharged due to feeling better after IVF and zofran. UA negative for infection. Here because mid abdominal pain is worse, mild headache reported, and nausea.  Patient not sure how far along she is in pregnancy, has not gotten an ultrasound, believes to be 12 weeks.   , denies vaginal bleeding, hx cholecystectomy      Triage Assessment     Row Name [id number]       Triage Assessment (Adult)    Airway WDL WDL       Respiratory WDL    Respiratory WDL WDL       Skin Circulation/Temperature WDL    Skin Circulation/Temperature WDL WDL       Cardiac WDL    Cardiac WDL WDL       Peripheral/Neurovascular WDL    Peripheral Neurovascular WDL WDL       Cognitive/Neuro/Behavioral WDL    Cognitive/Neuro/Behavioral WDL WDL

## 2023-06-13 NOTE — ED PROVIDER NOTES
NAME: Vernell Christensen  AGE: 28 year old female  YOB: 1995  MRN: [id number]  EVALUATION DATE & TIME: 2023 11:05 PM    PCP: No Ref-Primary, Physician    ED PROVIDER: Destin Hernandez M.D.      Chief Complaint   Patient presents with     Abdominal Pain     FINAL IMPRESSION:  1. Epigastric pain    2. Gastroesophageal reflux disease with esophagitis without hemorrhage    3. Threatened miscarriage        MEDICAL DECISION MAKIN:16 PM I met with the patient, obtained history, performed an initial exam, and discussed options and plan for diagnostics and treatment here in the ED.   Patient was clinically assessed and consented to treatment. After assessment, medical decision making and workup were discussed with the patient. The patient was agreeable to plan for testing, workup, and treatment.  12:27 AM Reevaluated and updated the patient. We discussed the plan for discharge and the patient is agreeable. Reviewed supportive cares, symptomatic treatment, outpatient follow up, and reasons to return to the Emergency Department. Patient to be discharged by ED RN.   Pertinent Labs & Imaging studies reviewed. (See chart for details)       Medical Decision Making    History:    Supplemental history from: Documented in chart, if applicable    External Record(s) reviewed: Documented in chart, if applicable.    Work Up:    Chart documentation includes differential considered and any EKGs or imaging independently interpreted by provider, where specified.    In additional to work up documented, I considered the following work up: Documented in chart, if applicable.    External consultation:    Discussion of management with another provider: Documented in chart, if applicable    Complicating factors:    Care impacted by chronic illness: Diabetes    Care affected by social determinants of health: N/A    Disposition considerations: Discharge. I prescribed additional prescription strength medication(s) as charted.  See documentation for any additional details.    Vernell Christensen is a 28 year old female who presents with abdominal pain, nausea.   Differential diagnosis includes but not limited to hyperemesis gravidarum, threatened miscarriage, dehydration, gastritis, GERD, esophagitis.  Patient already has her gallbladder out unlikely biliary colic or retained stone.  Labs from earlier were unremarkable and I do not feel repeat of labs less than 24 hours would be warranted.  I will check a beta-hCG as she has not have this rechecked.  We will start Reglan for nausea as well as IV fluids and plan for abdominal ultrasound to evaluate pregnancy for any concern of threatened miscarriage.  Patient has no vaginal discharge or bleeding though.  Patient comfortable with plan and will start work-up.  Labs done from this morning were unremarkable and check of beta-hCG was appropriate.  Patient sent for ultrasound which confirmed a 13-week, 2-day pregnancy with no signs of any bleeding, or evidence of miscarriage.  Patient would be planned for discharge home.  Patient's nausea was feeling better after the Reglan and this was followed with Pepcid and GI cocktail.  Patient's symptoms resolved and we will plan for discharge home on Reglan, Pepcid, and follow-up to her obstetric clinic.    0 minutes of critical care time    MEDICATIONS GIVEN IN THE EMERGENCY:  Medications   0.9% sodium chloride BOLUS (1,000 mLs Intravenous $New Bag 6/12/23 3697)     Followed by   sodium chloride 0.9% infusion (has no administration in time range)   famotidine (PEPCID) injection 20 mg (has no administration in time range)   alum & mag hydroxide-simethicone (MAALOX) suspension 15 mL (has no administration in time range)   metoclopramide (REGLAN) injection 5 mg (5 mg Intravenous $Given 6/12/23 5769)       NEW PRESCRIPTIONS STARTED AT TODAY'S ER VISIT:  New Prescriptions    FAMOTIDINE (PEPCID) 20 MG TABLET    Take 1 tablet (20 mg) by mouth 2 times daily for 7 days     METOCLOPRAMIDE (REGLAN) 5 MG TABLET    Take 1 tablet (5 mg) by mouth 4 times daily as needed (nausea)     =================================================================    HPI    Patient information was obtained from: patient    Use of : N/A         Vernell Christensen is a 28 year old female with a past medical history of diabetes, morbid obesity, and pregnancy, who presents abdominal pain.    Patient reports she was in the ED earlier today for evaluation of mid upper abdominal pain. She says her current symptoms feels similar to the pain earlier today. After she was evaluated in the ED in the morning, she returned home and slept. About 2 hours PTA (~9 PM), the sudden onset worsening mid upper abdominal pain returned. She associates nausea but no vomiting. She says the pain feels symptoms to her previous cholelithiasis but endorses cholecystectomy. Of note, patient is currently pregnant but doesn't know how far along she is.    There were no other concerns/complaints at this time.    REVIEW OF SYSTEMS   Review of Systems   Gastrointestinal: Positive for abdominal pain (mid upper) and nausea. Negative for vomiting.   All other systems reviewed and are negative.     PAST MEDICAL HISTORY:  Past Medical History:   Diagnosis Date     Cholestasis during pregnancy 6/8/2018     Diabetes mellitus (H)      Symptomatic cholelithiasis 9/4/2020       PAST SURGICAL HISTORY:  Past Surgical History:   Procedure Laterality Date     LAPAROSCOPIC CHOLECYSTECTOMY N/A 9/4/2020    Procedure: CHOLECYSTECTOMY, LAPAROSCOPIC;  Surgeon: Joao Willett MD;  Location: [facility];  Service: General     NO PAST SURGERIES         CURRENT MEDICATIONS:      Current Facility-Administered Medications:      0.9% sodium chloride BOLUS, 1,000 mL, Intravenous, Once, Last Rate: 1,000 mL/hr at 06/12/23 2336, 1,000 mL at 06/12/23 2336 **FOLLOWED BY** sodium chloride 0.9% infusion, , Intravenous, Continuous, Destin Hernandez,  "MD     alum & mag hydroxide-simethicone (MAALOX) suspension 15 mL, 15 mL, Oral, Once, Destin Hernandez MD     famotidine (PEPCID) injection 20 mg, 20 mg, Intravenous, Once, Destin Hernandez MD    Current Outpatient Medications:      famotidine (PEPCID) 20 MG tablet, Take 1 tablet (20 mg) by mouth 2 times daily for 7 days, Disp: 14 tablet, Rfl: 0     metoclopramide (REGLAN) 5 MG tablet, Take 1 tablet (5 mg) by mouth 4 times daily as needed (nausea), Disp: 20 tablet, Rfl: 0     ondansetron (ZOFRAN ODT) 4 MG ODT tab, Take 1 tablet (4 mg) by mouth every 8 hours as needed for nausea, Disp: 10 tablet, Rfl: 0     Prenatal Vit-Fe Fumarate-FA (PRENATAL MULTIVITAMIN W/IRON) 27-0.8 MG tablet, Take 1 tablet by mouth daily, Disp: 90 tablet, Rfl: 3    ALLERGIES:  No Known Allergies    FAMILY HISTORY:  Family History   Problem Relation Age of Onset     Hypertension Maternal Grandmother      Diabetes Maternal Grandmother      Diabetes Maternal Grandfather      Diabetes Paternal Grandmother      Diabetes Maternal Aunt      Diabetes Maternal Aunt        SOCIAL HISTORY:   Social History     Socioeconomic History     Marital status:    Tobacco Use     Smoking status: Never     Passive exposure: Never     Smokeless tobacco: Never   Vaping Use     Vaping status: Never Used   Substance and Sexual Activity     Alcohol use: No     Drug use: No     Sexual activity: Yes     Partners: Male     Birth control/protection: None   Social History Narrative    6/17/20  with 4 children. [place] speaking     PHYSICAL EXAM:    Vitals: /71   Pulse 74   Temp 97.6  F (36.4  C) (Temporal)   Resp 18   Ht 1.626 m (5' 4\")   Wt 90.6 kg (199 lb 12.8 oz)   LMP 03/05/2023 (Exact Date)   SpO2 99%   BMI 34.30 kg/m     Physical Exam  Vitals and nursing note reviewed.   Constitutional:       General: She is not in acute distress.     Appearance: She is well-developed and normal weight. She is not ill-appearing or " toxic-appearing.   HENT:      Head: Normocephalic.   Eyes:      General: No scleral icterus.  Cardiovascular:      Rate and Rhythm: Normal rate and regular rhythm.      Heart sounds: Normal heart sounds.   Pulmonary:      Effort: Pulmonary effort is normal. No respiratory distress.      Breath sounds: Normal breath sounds.   Abdominal:      General: There is distension (Gravid to about umbilicus or just below).      Palpations: Abdomen is soft.      Tenderness: There is no abdominal tenderness. There is no right CVA tenderness, left CVA tenderness, guarding or rebound.      Hernia: No hernia is present.   Skin:     General: Skin is warm and dry.      Coloration: Skin is not cyanotic or jaundiced.   Neurological:      General: No focal deficit present.      Mental Status: She is alert.   Psychiatric:         Behavior: Behavior normal.        LAB:  All pertinent labs reviewed and interpreted.  Labs Ordered and Resulted from Time of ED Arrival to Time of ED Departure   HCG QUANTITATIVE PREGNANCY - Abnormal       Result Value    hCG Quantitative 27,325 (*)        RADIOLOGY:  US OB < 14 Weeks Single   Final Result   IMPRESSION:    1.  Single living intrauterine gestation at 13 weeks 2 days, EDC 12/16/2023.              PROCEDURES:   Procedures     I, Radha Watkins, am serving as a scribe to document services personally performed by Dr. Destin Hernandez  based on my observation and the provider's statements to me. I, Destin Hernandez MD attest that Radha Watkins is acting in a scribe capacity, has observed my performance of the services and has documented them in accordance with my direction.    Destin Hernandez M.D.  Emergency Medicine  [facility] Emergency Department     Destin Hernandez MD  06/13/23 0032

## 2023-06-14 ENCOUNTER — APPOINTMENT (OUTPATIENT)
Dept: ULTRASOUND IMAGING | Facility: CLINIC | Age: 28
End: 2023-06-14
Attending: EMERGENCY MEDICINE
Payer: COMMERCIAL

## 2023-06-14 ENCOUNTER — HOSPITAL ENCOUNTER (EMERGENCY)
Facility: CLINIC | Age: 28
Discharge: HOME | End: 2023-06-14
Attending: EMERGENCY MEDICINE | Admitting: EMERGENCY MEDICINE
Payer: COMMERCIAL

## 2023-06-14 VITALS
OXYGEN SATURATION: 98 % | WEIGHT: 198.8 LBS | SYSTOLIC BLOOD PRESSURE: 115 MMHG | DIASTOLIC BLOOD PRESSURE: 73 MMHG | BODY MASS INDEX: 34.12 KG/M2 | HEART RATE: 72 BPM | RESPIRATION RATE: 14 BRPM | TEMPERATURE: 98.7 F

## 2023-06-14 VITALS
DIASTOLIC BLOOD PRESSURE: 81 MMHG | TEMPERATURE: 98.2 F | RESPIRATION RATE: 16 BRPM | WEIGHT: 187 LBS | HEART RATE: 75 BPM | OXYGEN SATURATION: 99 % | SYSTOLIC BLOOD PRESSURE: 139 MMHG | BODY MASS INDEX: 31.92 KG/M2 | HEIGHT: 64 IN

## 2023-06-14 DIAGNOSIS — O21.0 HYPEREMESIS GRAVIDARUM: ICD-10-CM

## 2023-06-14 DIAGNOSIS — K29.00 ACUTE GASTRITIS WITHOUT HEMORRHAGE, UNSPECIFIED GASTRITIS TYPE: ICD-10-CM

## 2023-06-14 DIAGNOSIS — R10.13 EPIGASTRIC PAIN: ICD-10-CM

## 2023-06-14 DIAGNOSIS — K21.00 GASTROESOPHAGEAL REFLUX DISEASE WITH ESOPHAGITIS WITHOUT HEMORRHAGE: ICD-10-CM

## 2023-06-14 LAB
ABO/RH(D): NORMAL
ALBUMIN SERPL-MCNC: 3.5 G/DL (ref 3.5–5)
ALBUMIN UR-MCNC: NEGATIVE MG/DL
ALP SERPL-CCNC: 91 U/L (ref 45–120)
ALT SERPL W P-5'-P-CCNC: 13 U/L (ref 0–45)
ANION GAP SERPL CALCULATED.3IONS-SCNC: 8 MMOL/L (ref 5–18)
ANTIBODY SCREEN: NEGATIVE
APPEARANCE UR: CLEAR
AST SERPL W P-5'-P-CCNC: 11 U/L (ref 0–40)
BACTERIA #/AREA URNS HPF: ABNORMAL /HPF
BACTERIA UR CULT: NORMAL
BASOPHILS # BLD AUTO: 0.1 10E3/UL (ref 0–0.2)
BASOPHILS NFR BLD AUTO: 1 %
BILIRUB SERPL-MCNC: 0.3 MG/DL (ref 0–1)
BILIRUB UR QL STRIP: NEGATIVE
BUN SERPL-MCNC: 5 MG/DL (ref 8–22)
CALCIUM SERPL-MCNC: 10.5 MG/DL (ref 8.5–10.5)
CHLORIDE BLD-SCNC: 103 MMOL/L (ref 98–107)
CO2 SERPL-SCNC: 26 MMOL/L (ref 22–31)
COLOR UR AUTO: ABNORMAL
CREAT SERPL-MCNC: 0.68 MG/DL (ref 0.6–1.1)
EOSINOPHIL # BLD AUTO: 0.1 10E3/UL (ref 0–0.7)
EOSINOPHIL NFR BLD AUTO: 1 %
ERYTHROCYTE [DISTWIDTH] IN BLOOD BY AUTOMATED COUNT: 13.4 % (ref 10–15)
GFR SERPL CREATININE-BSD FRML MDRD: >90 ML/MIN/1.73M2
GLUCOSE BLD-MCNC: 106 MG/DL (ref 70–125)
GLUCOSE UR STRIP-MCNC: NEGATIVE MG/DL
HCT VFR BLD AUTO: 37.2 % (ref 35–47)
HGB BLD-MCNC: 12.3 G/DL (ref 11.7–15.7)
HGB UR QL STRIP: NEGATIVE
HIV 1+2 AB+HIV1P24 AG SERPLBLD IA.RAPID: NON REACTIVE
HIV 1+2 AB+HIV1P24 AG SERPLBLD IA.RAPID: NON REACTIVE
HIV INTERPRETATION: NORMAL
IMM GRANULOCYTES # BLD: 0 10E3/UL
IMM GRANULOCYTES NFR BLD: 0 %
KETONES UR STRIP-MCNC: NEGATIVE MG/DL
LEUKOCYTE ESTERASE UR QL STRIP: NEGATIVE
LIPASE SERPL-CCNC: 16 U/L (ref 0–52)
LYMPHOCYTES # BLD AUTO: 2.3 10E3/UL (ref 0.8–5.3)
LYMPHOCYTES NFR BLD AUTO: 22 %
MCH RBC QN AUTO: 26.6 PG (ref 26.5–33)
MCHC RBC AUTO-ENTMCNC: 33.1 G/DL (ref 31.5–36.5)
MCV RBC AUTO: 81 FL (ref 78–100)
MONOCYTES # BLD AUTO: 0.6 10E3/UL (ref 0–1.3)
MONOCYTES NFR BLD AUTO: 6 %
MUCOUS THREADS #/AREA URNS LPF: PRESENT /LPF
NEUTROPHILS # BLD AUTO: 7.6 10E3/UL (ref 1.6–8.3)
NEUTROPHILS NFR BLD AUTO: 70 %
NITRATE UR QL: NEGATIVE
NRBC # BLD AUTO: 0 10E3/UL
NRBC BLD AUTO-RTO: 0 /100
PH UR STRIP: 5.5 [PH] (ref 5–7)
PLATELET # BLD AUTO: 298 10E3/UL (ref 150–450)
POTASSIUM BLD-SCNC: 3.6 MMOL/L (ref 3.5–5)
PROT SERPL-MCNC: 7.6 G/DL (ref 6–8)
RBC # BLD AUTO: 4.62 10E6/UL (ref 3.8–5.2)
RBC URINE: 2 /HPF
SODIUM SERPL-SCNC: 137 MMOL/L (ref 136–145)
SP GR UR STRIP: 1.02 (ref 1–1.03)
SPECIMEN EXPIRATION DATE: NORMAL
SQUAMOUS EPITHELIAL: <1 /HPF
UROBILINOGEN UR STRIP-MCNC: <2 MG/DL
WBC # BLD AUTO: 10.7 10E3/UL (ref 4–11)
WBC URINE: 1 /HPF

## 2023-06-14 PROCEDURE — 81001 URINALYSIS AUTO W/SCOPE: CPT | Performed by: EMERGENCY MEDICINE

## 2023-06-14 PROCEDURE — 36415 COLL VENOUS BLD VENIPUNCTURE: CPT | Performed by: EMERGENCY MEDICINE

## 2023-06-14 PROCEDURE — 96374 THER/PROPH/DIAG INJ IV PUSH: CPT

## 2023-06-14 PROCEDURE — 87340 HEPATITIS B SURFACE AG IA: CPT | Performed by: EMERGENCY MEDICINE

## 2023-06-14 PROCEDURE — 96361 HYDRATE IV INFUSION ADD-ON: CPT

## 2023-06-14 PROCEDURE — 86780 TREPONEMA PALLIDUM: CPT | Performed by: EMERGENCY MEDICINE

## 2023-06-14 PROCEDURE — 80053 COMPREHEN METABOLIC PANEL: CPT | Performed by: EMERGENCY MEDICINE

## 2023-06-14 PROCEDURE — 250N000013 HC RX MED GY IP 250 OP 250 PS 637: Performed by: EMERGENCY MEDICINE

## 2023-06-14 PROCEDURE — 99284 EMERGENCY DEPT VISIT MOD MDM: CPT | Mod: 25

## 2023-06-14 PROCEDURE — 86762 RUBELLA ANTIBODY: CPT | Performed by: EMERGENCY MEDICINE

## 2023-06-14 PROCEDURE — 96375 TX/PRO/DX INJ NEW DRUG ADDON: CPT

## 2023-06-14 PROCEDURE — 258N000003 HC RX IP 258 OP 636: Performed by: EMERGENCY MEDICINE

## 2023-06-14 PROCEDURE — 250N000011 HC RX IP 250 OP 636: Performed by: EMERGENCY MEDICINE

## 2023-06-14 PROCEDURE — 86765 RUBEOLA ANTIBODY: CPT | Performed by: EMERGENCY MEDICINE

## 2023-06-14 PROCEDURE — 99285 EMERGENCY DEPT VISIT HI MDM: CPT | Mod: 25

## 2023-06-14 PROCEDURE — 83690 ASSAY OF LIPASE: CPT | Performed by: EMERGENCY MEDICINE

## 2023-06-14 PROCEDURE — 87806 HIV AG W/HIV1&2 ANTB W/OPTIC: CPT | Performed by: EMERGENCY MEDICINE

## 2023-06-14 PROCEDURE — 76705 ECHO EXAM OF ABDOMEN: CPT

## 2023-06-14 PROCEDURE — 85025 COMPLETE CBC W/AUTO DIFF WBC: CPT | Performed by: EMERGENCY MEDICINE

## 2023-06-14 PROCEDURE — 87086 URINE CULTURE/COLONY COUNT: CPT | Performed by: EMERGENCY MEDICINE

## 2023-06-14 PROCEDURE — 86901 BLOOD TYPING SEROLOGIC RH(D): CPT | Performed by: EMERGENCY MEDICINE

## 2023-06-14 RX ORDER — ONDANSETRON 2 MG/ML
4 INJECTION INTRAMUSCULAR; INTRAVENOUS EVERY 30 MIN PRN
Status: DISCONTINUED | OUTPATIENT
Start: 2023-06-14 | End: 2023-06-14 | Stop reason: HOSPADM

## 2023-06-14 RX ORDER — SODIUM CHLORIDE 9 MG/ML
INJECTION, SOLUTION INTRAVENOUS CONTINUOUS
Status: DISCONTINUED | OUTPATIENT
Start: 2023-06-14 | End: 2023-06-14 | Stop reason: HOSPADM

## 2023-06-14 RX ORDER — SUCRALFATE 1 G/1
1 TABLET ORAL ONCE
Status: COMPLETED | OUTPATIENT
Start: 2023-06-14 | End: 2023-06-14

## 2023-06-14 RX ORDER — FAMOTIDINE 20 MG/1
20 TABLET, FILM COATED ORAL 2 TIMES DAILY
Qty: 20 TABLET | Refills: 0 | Status: SHIPPED | OUTPATIENT
Start: 2023-06-14 | End: 2023-06-14

## 2023-06-14 RX ORDER — SUCRALFATE 1 G/1
1 TABLET ORAL 4 TIMES DAILY
Qty: 30 TABLET | Refills: 0 | Status: SHIPPED | OUTPATIENT
Start: 2023-06-14

## 2023-06-14 RX ORDER — METOCLOPRAMIDE HYDROCHLORIDE 5 MG/ML
10 INJECTION INTRAMUSCULAR; INTRAVENOUS ONCE
Status: COMPLETED | OUTPATIENT
Start: 2023-06-14 | End: 2023-06-14

## 2023-06-14 RX ORDER — PYRIDOXINE HCL (VITAMIN B6) 25 MG
25 TABLET ORAL EVERY 6 HOURS PRN
Qty: 60 TABLET | Refills: 0 | Status: SHIPPED | OUTPATIENT
Start: 2023-06-14

## 2023-06-14 RX ORDER — MAGNESIUM HYDROXIDE/ALUMINUM HYDROXICE/SIMETHICONE 120; 1200; 1200 MG/30ML; MG/30ML; MG/30ML
30 SUSPENSION ORAL ONCE
Status: COMPLETED | OUTPATIENT
Start: 2023-06-14 | End: 2023-06-14

## 2023-06-14 RX ORDER — ALUMINA, MAGNESIA, AND SIMETHICONE 2400; 2400; 240 MG/30ML; MG/30ML; MG/30ML
30 SUSPENSION ORAL EVERY 4 HOURS PRN
Qty: 355 ML | Refills: 0 | Status: SHIPPED | OUTPATIENT
Start: 2023-06-14 | End: 2023-06-28

## 2023-06-14 RX ADMIN — SODIUM CHLORIDE 1000 ML: 9 INJECTION, SOLUTION INTRAVENOUS at 21:05

## 2023-06-14 RX ADMIN — FAMOTIDINE 20 MG: 10 INJECTION, SOLUTION INTRAVENOUS at 19:14

## 2023-06-14 RX ADMIN — SUCRALFATE 1 G: 1 TABLET ORAL at 21:05

## 2023-06-14 RX ADMIN — FAMOTIDINE 20 MG: 10 INJECTION, SOLUTION INTRAVENOUS at 02:28

## 2023-06-14 RX ADMIN — SODIUM CHLORIDE 1000 ML: 9 INJECTION, SOLUTION INTRAVENOUS at 01:05

## 2023-06-14 RX ADMIN — ONDANSETRON 4 MG: 2 INJECTION INTRAMUSCULAR; INTRAVENOUS at 01:05

## 2023-06-14 RX ADMIN — METOCLOPRAMIDE HYDROCHLORIDE 10 MG: 5 INJECTION INTRAMUSCULAR; INTRAVENOUS at 19:14

## 2023-06-14 RX ADMIN — ALUMINUM HYDROXIDE, MAGNESIUM HYDROXIDE, AND DIMETHICONE 30 ML: 200; 20; 200 SUSPENSION ORAL at 20:31

## 2023-06-14 RX ADMIN — SODIUM CHLORIDE 1000 ML: 9 INJECTION, SOLUTION INTRAVENOUS at 19:14

## 2023-06-14 ASSESSMENT — ENCOUNTER SYMPTOMS
ABDOMINAL PAIN: 1
NAUSEA: 1

## 2023-06-14 ASSESSMENT — ACTIVITIES OF DAILY LIVING (ADL)
ADLS_ACUITY_SCORE: 33
ADLS_ACUITY_SCORE: 35
ADLS_ACUITY_SCORE: 33

## 2023-06-14 NOTE — ED TRIAGE NOTES
Pt states is having jose umbilical pain. Pt is approx 2 months pregnant. This 5th pregnancy with 4 pregnancies that were vaginal delivery. Pt denies back pain, spotting nor discharge. Describes pain as a big whole and is intense pain. Last bm was about 2 hours ago and is described as unremarkable. Pt denies urinary symptoms. Pt denies pain nor discomfort upon palpation.

## 2023-06-14 NOTE — ED TRIAGE NOTES
Pt reports being seen multiple times in the last couple of days for the same symptoms. Reports severe mid abdominal pain with nausea. Pain is worse with moving around. Medicines have not been managing symptoms.     Triage Assessment     Row Name 06/14/23 8196       Triage Assessment (Adult)    Airway WDL WDL       Respiratory WDL    Respiratory WDL WDL       Skin Circulation/Temperature WDL    Skin Circulation/Temperature WDL WDL       Cardiac WDL    Cardiac WDL WDL       Peripheral/Neurovascular WDL    Peripheral Neurovascular WDL WDL       Cognitive/Neuro/Behavioral WDL    Cognitive/Neuro/Behavioral WDL WDL

## 2023-06-14 NOTE — ED PROVIDER NOTES
NAME: Vernell Christensen  AGE: 28 year old female  YOB: 1995  MRN: [id number]  EVALUATION DATE & TIME: 2023 12:35 AM    PCP: No Ref-Primary, Physician    ED PROVIDER: Destin Hernandez M.D.      Chief Complaint   Patient presents with     Abdominal Pain     FINAL IMPRESSION:  1. Epigastric pain    2. Gastroesophageal reflux disease with esophagitis without hemorrhage    3. Hyperemesis gravidarum      MEDICAL DECISION MAKIN:39 AM I met with the patient, obtained history, performed an initial exam, and discussed options and plan for diagnostics and treatment here in the ED.   2:13 AM I checked on the patient and discussed findings and discharge. She is feeling better.    Patient was clinically assessed and consented to treatment. After assessment, medical decision making and workup were discussed with the patient. The patient was agreeable to plan for testing, workup, and treatment.  Pertinent Labs & Imaging studies reviewed. (See chart for details)     Medical Decision Making    History:    Supplemental history from: Documented in chart, if applicable    External Record(s) reviewed: Documented in chart, if applicable.    Work Up:    Chart documentation includes differential considered and any EKGs or imaging independently interpreted by provider, where specified.    In additional to work up documented, I considered the following work up: Documented in chart, if applicable.    External consultation:    Discussion of management with another provider: Documented in chart, if applicable    Complicating factors:    Care impacted by chronic illness: N/A    Care affected by social determinants of health: N/A    Disposition considerations: Discharge. I prescribed additional prescription strength medication(s) as charted. See documentation for any additional details.    Vernell Christensen is a 28 year old female who presents with abdominal pain..   Differential diagnosis includes but not limited to gastritis,  gastroenteritis, pancreatitis, GERD with esophagitis.  Patient seen now 3 times and had labs drawn just over 24 hours ago.  I saw her previous evening with check of cramping of her lower abdomen that showed viable IUP.  Patient now with more upper to just above the periumbilical pain.  Patient minimally tender on exam and no vaginal discharge or bleeding I do not like miscarriage.  Patient does have significant hyperemesis gravidarum which is slightly better with Reglan that I prescribed her yesterday.  She has not tried the Pepcid however I do feel some of this is likely gastritis.  I did agree to rechecking labs and giving IV medication.  Patient had normal CBC, unremarkable metabolic panel and lipase.  Patient reassured by these and I also did upper quadrant ultrasound though she already had her gallbladder out to Dr. Padron and no acute findings on ultrasound.  Patient reassured and was feeling better after dose of Zofran evening.  I did give her Pepcid IV and with fluids pain was almost completely gone.  I did speak with patient about starting the Pepcid at home as I previously prescribed and will reevaluate her prescription.  As well may take over-the-counter Maalox to help with this and continue the Reglan to help with hyperemesis gravidarum.  Patient feeling better and with resolution of symptoms we will plan for discharge home with follow-up to her primary OB.    0 minutes of critical care time    MEDICATIONS GIVEN IN THE EMERGENCY:  Medications   0.9% sodium chloride BOLUS (0 mLs Intravenous Stopped 6/14/23 0214)   famotidine (PEPCID) injection 20 mg (20 mg Intravenous $Given 6/14/23 0228)       NEW PRESCRIPTIONS STARTED AT TODAY'S ER VISIT:  Discharge Medication List as of 6/14/2023  2:23 AM      START taking these medications    Details   !! famotidine (PEPCID) 20 MG tablet Take 1 tablet (20 mg) by mouth 2 times daily for 10 days, Disp-20 tablet, R-0, Local Print       !! - Potential duplicate  medications found. Please discuss with provider.             =================================================================    HPI    Patient information was obtained from: The patient    Use of : N/A       Vernell Christensen is a 28 year old female with a past medical history of symptomatic cholelithiasis, diabetes mellitus, who presents to the ED by walk-in for an evaluation of abdominal pain.  Patient seen just over 24 hours ago for abdominal pain and had labs drawn but no ultrasound.  She then came back later that evening with return of the pain but it was lower and she was concerned about cramps.  Beta-hCG was checked and was appropriate as well as ultrasound which was positive for a live IUP.  Patient reports feeling better after that visit but then symptoms of nausea and upper to periumbilical central abdominal pain returned today.  She reports taking the Reglan which helps with the nausea but still having the discomfort in the epigastrium.  She denies any diarrhea, no vomiting nausea only and no urinary symptoms.  Patient does have history of cholecystectomy.  No vaginal discharge, no vaginal bleeding, no cramps or contractions.      REVIEW OF SYSTEMS   Review of Systems   Gastrointestinal: Positive for abdominal pain and nausea.   All other systems reviewed and are negative.       PAST MEDICAL HISTORY:  Past Medical History:   Diagnosis Date     Cholestasis during pregnancy 6/8/2018     Diabetes mellitus (H)      Symptomatic cholelithiasis 9/4/2020       PAST SURGICAL HISTORY:  Past Surgical History:   Procedure Laterality Date     LAPAROSCOPIC CHOLECYSTECTOMY N/A 9/4/2020    Procedure: CHOLECYSTECTOMY, LAPAROSCOPIC;  Surgeon: Joao Willett MD;  Location: [facility];  Service: General     NO PAST SURGERIES         CURRENT MEDICATIONS:    No current facility-administered medications for this encounter.    Current Outpatient Medications:      famotidine (PEPCID) 20 MG tablet, Take 1  tablet (20 mg) by mouth 2 times daily for 10 days, Disp: 20 tablet, Rfl: 0     famotidine (PEPCID) 20 MG tablet, Take 1 tablet (20 mg) by mouth 2 times daily for 7 days, Disp: 14 tablet, Rfl: 0     metoclopramide (REGLAN) 5 MG tablet, Take 1 tablet (5 mg) by mouth 4 times daily as needed (nausea), Disp: 20 tablet, Rfl: 0     ondansetron (ZOFRAN ODT) 4 MG ODT tab, Take 1 tablet (4 mg) by mouth every 8 hours as needed for nausea, Disp: 10 tablet, Rfl: 0     Prenatal Vit-Fe Fumarate-FA (PRENATAL MULTIVITAMIN W/IRON) 27-0.8 MG tablet, Take 1 tablet by mouth daily, Disp: 90 tablet, Rfl: 3    ALLERGIES:  No Known Allergies    FAMILY HISTORY:  Family History   Problem Relation Age of Onset     Hypertension Maternal Grandmother      Diabetes Maternal Grandmother      Diabetes Maternal Grandfather      Diabetes Paternal Grandmother      Diabetes Maternal Aunt      Diabetes Maternal Aunt        SOCIAL HISTORY:   Social History     Socioeconomic History     Marital status:    Tobacco Use     Smoking status: Never     Passive exposure: Never     Smokeless tobacco: Never   Vaping Use     Vaping status: Never Used   Substance and Sexual Activity     Alcohol use: No     Drug use: No     Sexual activity: Yes     Partners: Male     Birth control/protection: None   Social History Narrative    6/17/20  with 4 children. Albanian speaking       PHYSICAL EXAM:    Vitals: /73   Pulse 72   Temp 98.7  F (37.1  C) (Oral)   Resp 14   Wt 90.2 kg (198 lb 12.8 oz)   LMP 03/05/2023 (Exact Date)   SpO2 98%   BMI 34.12 kg/m     Physical Exam  Vitals and nursing note reviewed.   Constitutional:       General: She is not in acute distress.     Appearance: She is well-developed and normal weight. She is not ill-appearing or toxic-appearing.   HENT:      Head: Normocephalic.   Eyes:      General: No scleral icterus.  Cardiovascular:      Rate and Rhythm: Normal rate and regular rhythm.      Heart sounds: Normal heart sounds.    Pulmonary:      Effort: Pulmonary effort is normal. No respiratory distress.      Breath sounds: Normal breath sounds.   Abdominal:      General: Bowel sounds are decreased.      Palpations: Abdomen is soft.      Tenderness: There is abdominal tenderness in the epigastric area and periumbilical area. There is no right CVA tenderness, left CVA tenderness, guarding or rebound.      Hernia: No hernia is present.   Skin:     General: Skin is warm and dry.      Coloration: Skin is not jaundiced.   Neurological:      General: No focal deficit present.      Mental Status: She is alert.   Psychiatric:         Behavior: Behavior normal.           LAB:  All pertinent labs reviewed and interpreted.  Labs Ordered and Resulted from Time of ED Arrival to Time of ED Departure   COMPREHENSIVE METABOLIC PANEL - Abnormal       Result Value    Sodium 137      Potassium 3.6      Chloride 103      Carbon Dioxide (CO2) 26      Anion Gap 8      Urea Nitrogen 5 (*)     Creatinine 0.68      Calcium 10.5      Glucose 106      Alkaline Phosphatase 91      AST 11      ALT 13      Protein Total 7.6      Albumin 3.5      Bilirubin Total 0.3      GFR Estimate >90     ROUTINE UA WITH MICROSCOPIC REFLEX TO CULTURE - Abnormal    Color Urine Light Yellow      Appearance Urine Clear      Glucose Urine Negative      Bilirubin Urine Negative      Ketones Urine Negative      Specific Gravity Urine 1.016      Blood Urine Negative      pH Urine 5.5      Protein Albumin Urine Negative      Urobilinogen Urine <2.0      Nitrite Urine Negative      Leukocyte Esterase Urine Negative      Bacteria Urine Few (*)     Mucus Urine Present (*)     RBC Urine 2      WBC Urine 1      Squamous Epithelials Urine <1     LIPASE - Normal    Lipase 16     CBC WITH PLATELETS AND DIFFERENTIAL    WBC Count 10.7      RBC Count 4.62      Hemoglobin 12.3      Hematocrit 37.2      MCV 81      MCH 26.6      MCHC 33.1      RDW 13.4      Platelet Count 298      % Neutrophils 70       % Lymphocytes 22      % Monocytes 6      % Eosinophils 1      % Basophils 1      % Immature Granulocytes 0      NRBCs per 100 WBC 0      Absolute Neutrophils 7.6      Absolute Lymphocytes 2.3      Absolute Monocytes 0.6      Absolute Eosinophils 0.1      Absolute Basophils 0.1      Absolute Immature Granulocytes 0.0      Absolute NRBCs 0.0     URINE CULTURE       RADIOLOGY:  US Abdomen Limited (RUQ)   Final Result   IMPRESSION:   1.  Status post cholecystectomy. No biliary dilatation.      2.  Diffuse hepatic steatosis.              PROCEDURES:   Procedures       I, Solitario Johnson, am serving as a scribe to document services personally performed by Dr. Destin eHrnandez  based on my observation and the provider's statements to me. I, Destin Hernandez MD attest that Solitario Johnson is acting in a scribe capacity, has observed my performance of the services and has documented them in accordance with my direction.      Destin Hernandez M.D.  Emergency Medicine  [facility] Emergency Department       Destin Hernandez MD  06/14/23 2790

## 2023-06-14 NOTE — ED PROVIDER NOTES
EMERGENCY DEPARTMENT ENCOUNTER      NAME: Vernell Christensen  YOB: 1995  MRN: [id number]    FINAL IMPRESSION  1. Acute gastritis without hemorrhage, unspecified gastritis type    2. Hyperemesis gravidarum        MEDICAL DECISION MAKING   Pertinent Labs & Imaging studies reviewed. (See chart for details)    Vernell Christensen is a 28 year old female who presents for evaluation of abdominal pain, nausea, vomiting.  Records reviewed.  Patient has been seen here in the ED 3 times in the last 48 hours with complaints of abdominal pain, nausea, and vomiting.  She has had extensive evaluation including labs, UA, right upper quadrant and OB ultrasounds.  All of these evaluations have been reassuring and it is felt that her symptoms are related to gastritis/GERD and hyperemesis gravidarum.  Most recently, she was discharged with recommendations to start Pepcid and follow-up with OB.  She returns today stating that after she got home, her symptoms recurred.  She states that she is not able to keep any food or fluids down and has continued to have some upper and periumbilical abdominal pain.  She reports that she has been taking the medication she was advised to use when she was most recently seen here earlier this morning but does not recall the names.  This is her fifth pregnancy and she did not have any symptoms like this with her previous pregnancies.  She has not yet established care with an OB/GYN with her current pregnancy, notes that she delivered 3 of her babies in [place] and one here.  Today, patient denies fever, chills, dysuria, hematuria, vaginal bleeding or loss of fluid, constipation, diarrhea, or any other new symptoms. Remainder of history and exam, as below.     I considered a broad differential including but not limited to gastritis, gastroenteritis, GERD, hyperemesis gravidarum, electrolyte derangement, acute kidney injury.  Patient has a benign abdominal exam and has already had multiple recent  ultrasounds that have been reassuring, including a 1 with single living IUP.  Has not had any vaginal bleeding or discharge and I have low suspicion for any pregnancy compromise such as placental abruption, previa, miscarriage.  I also have low suspicion for appendicitis, diverticulitis, obstruction, or other acute intra-abdominal/surgical process.  Although she reports she is not able to keep any food or fluids down, she actually appears fairly well-hydrated.  With this, I have low suspicion for electrolyte derangement, acute kidney injury.  Lipase was negative so pancreatitis also seems unlikely.  I also have low suspicion for acute hepatobiliary disease given recent negative work-ups.  Overall, I suspect patient's symptoms are related to gastritis/GERD and hyperemesis gravidarum.  Discussed options for work-up and management with patient.  As she just had labs this morning, we will hold on these for now but instead focus on symptom management.    I discussed the case with Dr. Woodruff of OB/GYN.  She graciously came to the ED to meet with the patient and agreed with work-up and management here in the ED thus far and also at most recent visits.  She recommended driving basic prenatal labs including HIV, hepatitis C, ABO and Rh, rubella, syphilis, rubeola given lack of outpatient care thus far.  She agreed that patient does not necessarily need to stay in the hospital overnight, as they would likely only rehydrate and continue to give medications for symptom management.  She feels that instead, patient would be better served following up and establishing care with OB/GYN in clinic.  Dr. Woodruff had a relatively long discussion with the patient regarding recommendation for home as well as the importance of outpatient follow-up.  We have agreed on plan for discharge with prescriptions for Carafate, omeprazole, Maalox, doxylamine, pyridoxine.  We will also give additional fluids and try Maalox and Carafate.  ED.    I  rechecked the patient and reviewed recommendations for management of symptoms and follow-up on an outpatient basis.  She has been able to tolerate p.o. here and does report feeling a bit better after additional fluids, Maalox, and Carafate.  She felt comfortable with plan for    Strict return precautions and follow up recommendations were discussed and all questions were answered. Patient endorsed understanding and was in agreement with plan.    Medical Decision Making    History:    Supplemental history from: Documented in chart, if applicable    External Record(s) reviewed: Documented in chart, if applicable.    Work Up:    Chart documentation includes differential considered and any EKGs or imaging independently interpreted by provider, where specified.    In additional to work up documented, I considered the following work up: Documented in chart, if applicable.    External consultation:    Discussion of management with another provider: Documented in chart, if applicable    Complicating factors:    Care impacted by chronic illness: N/A    Care affected by social determinants of health: Access to Medical Care, Literacy, Medication Noncompliance and No Support for Care at Home    Disposition considerations: Discharge. I prescribed additional prescription strength medication(s) as charted. I considered admission, but discharged the patient after share decision making conversation.          ED COURSE  6:45 PM I met with the patient for an initial encounter and evaluation.  7:40 PM I spoke with Pharmacy regarding patient's medications.  7:47 PM I spoke with OB, Dr. Woodruff, regarding patient's plan of care.  8:29 PM I spoke with OB, Dr. Woodruff, regarding patient's care. He suggested for the patient to be discharged.  9:21 PM Rechecked and updated the patient about lab results and what I discussed with Dr. Woodruff. We discussed plans for discharge.       MEDICATIONS GIVEN IN THE ED  Medications   0.9% sodium chloride  BOLUS (0 mLs Intravenous Stopped 6/14/23 2105)   famotidine (PEPCID) injection 20 mg (20 mg Intravenous $Given 6/14/23 1914)   metoclopramide (REGLAN) injection 10 mg (10 mg Intravenous $Given 6/14/23 1914)   alum & mag hydroxide-simethicone (MAALOX) suspension 30 mL (30 mLs Oral $Given 6/14/23 2031)   sucralfate (CARAFATE) tablet 1 g (1 g Oral $Given 6/14/23 2105)   0.9% sodium chloride BOLUS (1,000 mLs Intravenous $New Bag 6/14/23 2105)       NEW PRESCRIPTIONS STARTED AT TODAY'S VISIT  New Prescriptions    ALUM & MAG HYDROXIDE-SIMETHICONE (MAALOX MAX) 400-400-40 MG/5ML SUSP SUSPENSION    Take 30 mLs by mouth every 4 hours as needed for indigestion    DOXYLAMINE (UNISOM) 25 MG TABS TABLET    Take 1 tablet (25 mg) by mouth every 6 hours as needed for other (vomiting)    OMEPRAZOLE (PRILOSEC) 20 MG DR CAPSULE    Take 1 capsule (20 mg) by mouth daily    PYRIDOXINE (VITAMIN B6) 25 MG TABLET    Take 1 tablet (25 mg) by mouth every 6 hours as needed (vomiting)    SUCRALFATE (CARAFATE) 1 GM TABLET    Take 1 tablet (1 g) by mouth 4 times daily          =================================================================    Chief Complaint   Patient presents with     Abdominal Pain         HPI:    Patient information was obtained from: Patient    Use of : N/A     Vernell Christensen is a 28 year old female who presents to the ED by private car with a concern of abdominal pain.    Per chart review, patient visited [facility] Emergency Room on 6/14, with a concern of abdominal pain. Seen previously with check of cramping of her lower abdomen that showed viable IUP, twice in the ED (~past day). Patient now with more upper to just above the periumbilical pain. Patient had significant hyperemesis gravidarum which is slightly better with Reglan prescribed yesterday.  She hasn't tried the Pepcid however, some of this is likely gastritis. Patient had normal CBC, unremarkable metabolic panel and  "lipase. Also did upper quadrant ultrasound though she already had her gallbladder out. Normal and no acute findings on ultrasound. Felt better after dose of Zofran evening. Given Pepcid IV and with fluids pain was almost completely gone. May take over-the-counter Maalox to help with this and continue the Reglan to help with hyperemesis gravidarum. Prescribed with famotidine. No other medical concerns.    Patient reports an onset of epigastric and left-sided abdominal pain. She endorses nausea, vomiting, and \"can't keep anything down\". She now has \"pressure\" on her lower abdomen and states her pain is \"worses\". She tried taking her prescribed medications which didn't \"work\". Patient reports she is currently 14 weeks pregnant and is on her fifth pregnancy. She notes never experiencing theses symptoms during her last pregnancies. She states she never was admitted to the hospital for her symptoms. Patient denies fever, vaginal bleeding, dysuria, hematuria, constipation, and diarrhea. No other medical concerns are expressed at this time.     RELEVANT HISTORY, MEDICATIONS, & ALLERGIES   Past medical history, surgical history, family history, medications, and allergies reviewed and pertinent noted in HPI.    REVIEW OF SYSTEMS:  A complete review of systems was performed with pertinent positives and negatives noted in the HPI. All other systems negative.     PHYSICAL EXAM:    Vitals: /81   Pulse 75   Temp 98.2  F (36.8  C) (Oral)   Resp 16   Ht 1.626 m (5' 4\")   Wt 84.8 kg (187 lb)   LMP 03/05/2023 (Exact Date)   SpO2 99%   BMI 32.10 kg/m     General: Alert and interactive, comfortable appearing.  HENT: Atraumatic. Full AROM of neck. Conjunctiva clear.   Cardiovascular: Regular rate and rhythm.   Chest/Pulmonary: Normal work of breathing. Speaking in complete sentences. Lungs CTAB. No chest wall tenderness or deformities.  Abdomen: Soft, gravid. Nontender without guarding or rebound.  Extremities: Normal AROM " of all major joints.  Skin: Warm and dry. Normal skin color.   Neuro: Speech clear. CNs grossly intact. Moves all extremities spontaneously.   Psych: Normal affect/mood, cooperative, memory appropriate.    LAB  Labs Ordered and Resulted from Time of ED Arrival to Time of ED Departure   HIV RAPID ANTIBODY SCREEN       Result Value    HIV-1/HIV-2 Antibody Non Reactive      HIV1 P24 Antigen Non Reactive      HIV Interpretation       HEPATITIS B SURFACE ANTIGEN   RUBELLA ANTIBODY IGG   RUBEOLA ANTIBODY IGG   TREPONEMA ABS W REFLEX TO RPR AND TITER   TYPE AND SCREEN, ADULT    ABO/RH(D) O POS      Antibody Screen Negative      SPECIMEN EXPIRATION DATE [id number]     ABO/RH TYPE AND SCREEN           I, Sidneybob Jose, am serving as a scribe to document services personally performed by Dr. Zoe Verde based on my observation and the provider's statements to me. I, Zoe Verde MD attest that Tor Garcia is acting in a scribe capacity, has observed my performance of the services and has documented them in accordance with my direction.    Zoe Verde M.D.  Emergency Medicine  [facility] EMERGENCY ROOM  [address] [facility] [id number] [phone]  Dept: [phone]     Zoe Verde MD  06/14/23 3134

## 2023-06-15 LAB
BACTERIA UR CULT: NORMAL
HBV SURFACE AG SERPL QL IA: NONREACTIVE
MEV IGG SER IA-ACNC: 192 AU/ML
MEV IGG SER IA-ACNC: POSITIVE
RUBV IGG SERPL QL IA: 1.14 INDEX
RUBV IGG SERPL QL IA: POSITIVE
T PALLIDUM AB SER QL: NONREACTIVE

## 2023-06-15 NOTE — CONSULTS
[facility] LABOR & DELIVERY   METROPARTNERS CONSULTATION    NAME:Vernell Christensen  : 1995   MRN: [id number]   GESTATIONAL AGE: 13w5d    ADMISSION DATE: 2023     PCP:  No Ref-Primary, Physician     CHIEF COMPLAINT:  Nausea and epigastric pain    HPI: I am here to evaluate Vernell Christensen for hyperemesis gravidarum and epigastric pain. Patient is a 28 year old,  female at 13w5d gestation.  EDC is December 15 2023 by LMP and consistent with 6-week ultrasound.  History obtained through the patient and chart review. Patient reports that she has a history of hyperemesis gravidarum with all of her pregnancies.  She has vomited 3 times today.  She has been in the emergency department multiple times this week.  She does get relief with IV hydration.  She was last sent home on Reglan which she took twice.     Patient has not been seen for prenatal care this visit.  She reports she delivered 3 of her children in [place] and one of her children at [facility] with Dr. Susan Bejarano.    DIAGNOSIS COMPLICATING PREGNANCY  History of cholestasis x2  History of  delivery secondary to cholestasis of pregnancy  Hyperemesis gravidarum    OBSTETRICAL HISTORY:     - 2012 F  FT  -  F  FT  -  F  FT cholestasis  -  M  36w cholestasis    PAST MEDICAL HISTORY:  Past Medical History:   Diagnosis Date     Cholestasis during pregnancy 2018     Diabetes mellitus (H)      Symptomatic cholelithiasis 2020        PAST SURGICAL HISTORY:  Past Surgical History:   Procedure Laterality Date     LAPAROSCOPIC CHOLECYSTECTOMY N/A 2020    Procedure: CHOLECYSTECTOMY, LAPAROSCOPIC;  Surgeon: Joao Willett MD;  Location: [facility];  Service: General     NO PAST SURGERIES          SOCIAL HISTORY:   reports that she has never smoked. She has never been exposed to tobacco smoke. She has never used smokeless tobacco. She reports that she does not drink alcohol  "and does not use drugs.     MEDICATIONS:  [COMPLETED] 0.9% sodium chloride BOLUS  [COMPLETED] famotidine (PEPCID) injection 20 mg    famotidine (PEPCID) 20 MG tablet, Take 1 tablet (20 mg) by mouth 2 times daily for 7 days  metoclopramide (REGLAN) 5 MG tablet, Take 1 tablet (5 mg) by mouth 4 times daily as needed (nausea)         ALLERGIES:  No Known Allergies     REVIEW OF SYSTEMS   Negative except what is stated in the HPI    PHYSICAL EXAM:  /81   Pulse 75   Temp 98.2  F (36.8  C) (Oral)   Resp 16   Ht 1.626 m (5' 4\")   Wt 84.8 kg (187 lb)   LMP 03/05/2023 (Exact Date)   SpO2 99%   BMI 32.10 kg/m    GEN: NAD; Alert and oriented, appropriate affect.  HEENT  negative  Heart     no pedal edema, no JVD, no hepatosplenomegaly  Lungs      Abdomen   Abdomen soft, non-tender. BS normal. No masses, organomegaly  Extremities  Normal, Warm, No cyanosis, no clubbing, No edema and nontender  Vaginal exam: deferred          Pertinent Studies:   All laboratory data reviewed  Serum Glucose range: Recent Labs   Lab 06/14/23  0106 06/12/23  0754    88       CBC:   Recent Labs   Lab 06/14/23  0106 06/12/23  0754   WBC 10.7 8.7   HGB 12.3 11.8   HCT 37.2 36.5   MCV 81 83    278   NEUTROPHIL 70 67   LYMPH 22 25   MONOCYTE 6 5   EOSINOPHIL 1 2     Chemistry:   Recent Labs   Lab 06/14/23  0106 06/12/23  0754    137   POTASSIUM 3.6 3.6   CHLORIDE 103 104   CO2 26 24   BUN 5* 5*   CR 0.68 0.64   GFRESTIMATED >90 >90   KIRT 10.5 9.1   PROTTOTAL 7.6 7.2   ALBUMIN 3.5 3.4*   AST 11 16   ALT 13 18   ALKPHOS 91 88   BILITOTAL 0.3 0.4       IMAGING:  US Abdomen Limited (RUQ)  Narrative: EXAM: US ABDOMEN LIMITED  LOCATION: [facility]  DATE: 6/14/2023    INDICATION: Mid to upper abdominal pain. Nausea.  COMPARISON: 09/04/2020.  TECHNIQUE: Limited abdominal ultrasound.    FINDINGS:    GALLBLADDER: Status post cholecystectomy.    BILE DUCTS: No biliary dilatation. The common duct measures 5 " mm.    LIVER: Increased echogenicity from diffuse fatty infiltration. No focal mass. Main portal vein is patent with hepatopedal flow.    RIGHT KIDNEY: No hydronephrosis.    PANCREAS: The visualized portions are normal.    No ascites.  Impression: IMPRESSION:  1.  Status post cholecystectomy. No biliary dilatation.    2.  Diffuse hepatic steatosis.     I have reviewed the radiologists interpretation     IMPRESSION:  28 year old  @ 13w5d   Pregnancy complications include:   Hyperemesis  Late prenatal care    RECOMMENDATIONS:  Unisom / B6  Pepcid  Reglan  Maalox  IV hydration  Plan on discharge to home  Advised to call [facility] to arrange for prenatal visit and subsequent care.      Thank you for allowing us to participate in the care of this patient.  Please contact us with any questions/concerns.      Fela Woodruff, DO on 2023 at 8:49 PM

## 2023-06-15 NOTE — PHARMACY-ADMISSION MEDICATION HISTORY
Pharmacist Admission Medication History    Admission medication history is complete. The information provided in this note is only as accurate as the sources available at the time of the update.    Medication reconciliation/reorder completed by provider prior to medication history? No    Information Source(s): Patient and CareEverywhere/SureScripts via in-person    Pertinent Information: none    Changes made to PTA medication list:    Added: none    Deleted: ondansetron, duplicate famotidine, prenatal    Changed: none    Allergies reviewed with patient and updates made in EHR: yes    Medication History Completed By: Iwona Odell RP 6/14/2023 7:55 PM    Prior to Admission medications    Medication Sig Last Dose Taking? Auth Provider Long Term End Date   famotidine (PEPCID) 20 MG tablet Take 1 tablet (20 mg) by mouth 2 times daily for 7 days 6/14/2023 at am Yes Destin Hernandez MD  6/20/23   metoclopramide (REGLAN) 5 MG tablet Take 1 tablet (5 mg) by mouth 4 times daily as needed (nausea) 6/14/2023 Yes Destin Hernandez MD

## 2023-06-15 NOTE — DISCHARGE INSTRUCTIONS
You were seen in the Emergency Department today for nausea, vomiting, and abdominal pain.      You are being sent with a prescription for several medications that can help with your symptoms. Please take as directed.   - Carafate  - Maalox  - Unisom  - Vitamin B6  - Reglan  - Omeprazole (you can try taking this instead of the pepcid since it wasn't helping)    Make sure you're drinking plenty of fluids and eating foods that are gentle on your stomach like bread, rice, applesauce, soup.       Please return to the ER if you experience inability to keep food/fluids down, vaginal bleeding or loss of fluid, fever, and/or for any other new or concerning symptoms, otherwise please follow up with the OB team tomorrow to schedule a recheck.     Below is some information you might find useful.     Thank you for choosing [facility]. It was a pleasure taking care of you today!  - Dr. Zoe Verde

## 2023-06-20 ENCOUNTER — LAB REQUISITION (OUTPATIENT)
Dept: LAB | Facility: CLINIC | Age: 28
End: 2023-06-20

## 2023-06-20 DIAGNOSIS — O21.0 MILD HYPEREMESIS GRAVIDARUM: ICD-10-CM

## 2023-06-20 DIAGNOSIS — O09.293 SUPERVISION OF PREGNANCY WITH OTHER POOR REPRODUCTIVE OR OBSTETRIC HISTORY, THIRD TRIMESTER: ICD-10-CM

## 2023-06-20 PROCEDURE — 84443 ASSAY THYROID STIM HORMONE: CPT | Performed by: FAMILY MEDICINE

## 2023-06-20 PROCEDURE — 86803 HEPATITIS C AB TEST: CPT | Performed by: FAMILY MEDICINE

## 2023-06-21 LAB
HCV AB SERPL QL IA: NONREACTIVE
TSH SERPL DL<=0.005 MIU/L-ACNC: 1.14 UIU/ML (ref 0.3–4.2)

## 2023-08-16 ENCOUNTER — HOSPITAL ENCOUNTER (OUTPATIENT)
Facility: CLINIC | Age: 28
End: 2023-08-16
Admitting: STUDENT IN AN ORGANIZED HEALTH CARE EDUCATION/TRAINING PROGRAM
Payer: COMMERCIAL

## 2023-08-16 ENCOUNTER — HOSPITAL ENCOUNTER (OUTPATIENT)
Facility: CLINIC | Age: 28
Discharge: HOME | End: 2023-08-16
Attending: STUDENT IN AN ORGANIZED HEALTH CARE EDUCATION/TRAINING PROGRAM | Admitting: STUDENT IN AN ORGANIZED HEALTH CARE EDUCATION/TRAINING PROGRAM
Payer: COMMERCIAL

## 2023-08-16 VITALS
SYSTOLIC BLOOD PRESSURE: 121 MMHG | HEART RATE: 74 BPM | OXYGEN SATURATION: 98 % | DIASTOLIC BLOOD PRESSURE: 69 MMHG | TEMPERATURE: 98.2 F | BODY MASS INDEX: 32.44 KG/M2 | RESPIRATION RATE: 2 BRPM | WEIGHT: 190 LBS | HEIGHT: 64 IN

## 2023-08-16 PROCEDURE — G0463 HOSPITAL OUTPT CLINIC VISIT: HCPCS

## 2023-08-16 NOTE — DISCHARGE INSTRUCTIONS
OB Triage Discharge Instructions    Diet:  Regular diet as tolerated    Activity:  As tolerated    Other Special Instructions: Consider purchasing an pregnancy support belt. You can take tylenol at home as needed for the discomfort. 650 mg every 4-6 hours by mouth     Reason for being seen in L&D: Abdominal pain     Treatment/procedures performed in L&D: ***    Call the [facility] at [phone] if you have:  5 or more contractions in one hour  Leaking of fluid from your vaginal area  Decreased fetal movement (follow kick count instructions)  Bleeding from your vaginal area  Swelling in your face, or increased swelling in your hands or legs  Headaches or vision problems such as blurring or seeing spots or starts  Nausea or vomiting lasting for more than 24 hours  An increase in weight (5lbs/week)  Epigastric pain (sometimes confused with heartburn that does not go away or a very bad stomach ache)    If you have any questions, please follow up with your doctor.    -

## 2023-08-16 NOTE — PROGRESS NOTES
Data: Patient presented to [facility]: 2023  5:36 PM.  Reason for maternal/fetal assessment is abdominal pain. Patient reports abdominal pain started on Monday it is located in her lower abdomin and rated 6/10 pain is described as cramping and consistent. Patient denies leaking of vaginal fluid/rupture of membranes, vaginal bleeding, pelvic pressure, nausea, vomiting, headache, visual disturbances, epigastric or RUQ pain, significant edema. Patient reports fetal movement is normal. Patient is a 22w5d .  Prenatal record reviewed. Pregnancy has been uncomplicated.    Vital signs wnl. Support person is not present.     Action: Verbal consent for EFM. Triage assessment completed.     Pt appear to be comfortable during visit. No physical signs of distress or pain. She denies any vaginal discharge, pain with urination, flank pain, pain with intercourse, or change in vaginal smell.     Dr. Kaplan contacted via telephone.   TORB: discharge patient to home. Have her follow up in clinic with in the next week.      Response: Patient verbalized agreement with plan.

## 2023-08-16 NOTE — PROGRESS NOTES
OB Triage Note        Assessment and Plan:     Vernell Christensen is a 28 year old  at 22w5d not in labor.   Patient Active Problem List   Diagnosis    Class 2 obesity    Drug-induced weight gain    Insulin resistance    Morbid obesity (H)    Pregnancy test positive     Round ligament pain  Patient's pain consistent with round ligament pain and she is reassured by this.  Patient previously made aware that this is common during pregnancy and that she may benefit from a pregnancy belt.  Patient will be discharged home and follow-up in clinic within the next week for further evaluation and to obtain a pregnancy belt.    Patient discussed with attending physician, Dr. Slava Kaplan, who agrees with the plan.      ARMANDO LOPEZ MD PGY1 2023  [facility]       Subjective:     Vernell Christensen is a  28 year old female at 22w5d with an unremarkable prenatal history who presents to OB triage with bilateral groin pain around her round ligaments.   Vernell Christensen is a patient of Dr. Slava Kaplan Physician from [facility].     Patient states that she has had abdominal pain starting on 2023.  The pain is isolated along the round ligament bilaterally.  Patient rates the pain at a 6/10 and she describes it as cramping and constant.  Patient denies any leaking of vaginal fluid/rupture of membranes, vaginal bleeding, pelvic pressure, nausea, vomiting, headache, changes in vision, right upper quadrant pain or epigastric pain.  Patient denies significant lower extremity edema.  Fetal movement is normal per patient.  Pregnancy has been uncomplicated thus far.    Estimated Date of Delivery: Dec 15, 2023 Patient's last menstrual period was 2023 (exact date).     Her prenatal course has been uncomplicated     Prenatal labs:   Lab Results   Component Value Date    AS Negative 2023    HEPBANG Nonreactive 2023    CHPCRT Negative 2023    GCPCRT  "Negative 05/12/2023    HGB 12.3 06/14/2023          Review of Systems:   CONSTITUTIONAL: no fatigue, no unexpected change in weight  SKIN: no worrisome rashes or lesions  EYES: no acute vision problems or changes  ENT: no ear problems, no mouth problems, no throat problems  RESP: no significant cough, no shortness of breath  CV: no chest pain, no palpitations, no new or worsening peripheral edema  GI: no nausea, no vomiting, no constipation, no diarrhea  : no frequency, no dysuria, no hematuria, no pain with intercourse, no flank pain, no discharge, no change in vaginal smell   NEURO: no weakness, no dizziness, no headaches  ENDOCRINE: no temperature intolerance, no skin/hair changes  PSYCHIATRIC: NEGATIVE for changes in mood or trouble with sleep         Physical Exam:   Vitals:   /69 (BP Location: Right arm, Patient Position: Semi-Humphreys's, Cuff Size: Adult Regular)   Pulse 74   Temp 98.2  F (36.8  C) (Oral)   Resp (!) 2   Ht 1.626 m (5' 4\")   Wt 86.2 kg (190 lb)   LMP 03/05/2023 (Exact Date)   SpO2 98%   BMI 32.61 kg/m    190 lbs 0 oz  Estimated body mass index is 32.61 kg/m  as calculated from the following:    Height as of this encounter: 1.626 m (5' 4\").    Weight as of this encounter: 86.2 kg (190 lb).    GEN: Awake, alert in no apparent distress   HEENT: grossly normal  NECK: no lymphadenopathy or thryoidomegaly  RESPIRATORY: clear to auscultation bilaterally, no increased work of breathing  BACK:  no costovertebral angle tenderness   CARDIOVASCULAR: RRR, no murmur  ABDOMEN: gravid  PELVIC:  no fluid noted, no blood noted.   EXT:  no edema or calf tenderness    NST interpretation:  Baseline rate 140 normal  Accelerations present  Decelerations not present  Interpretation: reactive    Labs today:  No results found for any visits on 08/16/23.    "

## 2024-10-18 ENCOUNTER — LAB REQUISITION (OUTPATIENT)
Dept: LAB | Facility: CLINIC | Age: 29
End: 2024-10-18

## 2024-10-18 DIAGNOSIS — F41.9 ANXIETY DISORDER, UNSPECIFIED: ICD-10-CM

## 2024-10-18 PROCEDURE — 84443 ASSAY THYROID STIM HORMONE: CPT | Performed by: FAMILY MEDICINE

## 2024-10-19 LAB — TSH SERPL DL<=0.005 MIU/L-ACNC: 2.42 UIU/ML (ref 0.3–4.2)

## 2024-10-23 ENCOUNTER — PATIENT OUTREACH (OUTPATIENT)
Dept: CARE COORDINATION | Facility: CLINIC | Age: 29
End: 2024-10-23
Payer: COMMERCIAL

## 2024-10-24 NOTE — PROGRESS NOTES
Clinic Care Coordination Contact  [facility]/Voicemail    Clinical Data: Care Coordinator Outreach    Outreach Documentation Number of Outreach Attempt   10/23/2024   9:43 AM 1       Left message on patient's voicemail with call back information and requested return call.      Plan: Care Coordinator will try to reach patient again in 1-2 business days.    Laura Hylton [facility]  Social Work Care Coordinator - [facility]  Care Coordination  [email or web address]  Cell Phone: [phone]  Gender pronouns: she/her  Employed by [employer]

## 2024-10-29 ENCOUNTER — PATIENT OUTREACH (OUTPATIENT)
Dept: CARE COORDINATION | Facility: CLINIC | Age: 29
End: 2024-10-29
Payer: COMMERCIAL

## 2024-11-01 NOTE — PROGRESS NOTES
Clinic Care Coordination Contact  Clinic Care Coordination Contact  OUTREACH    Referral Information:  Referral Source: Care Conference    Primary Diagnosis: Psychosocial    Chief Complaint   Patient presents with    Clinic Care Coordination - Initial        Universal Utilization:   Clinic Utilization: [facility]   Difficulty keeping appointments:: No  Compliance Concerns: No  No-Show Concerns: No  No PCP office visit in Past Year: No  Utilization      No Show Count (past year)  0             ED Visits  1             Hospital Admissions  0                    Current as of: 11/11/2024  1:38 PM                Clinical Concerns:  Current Medical Concerns:  n/a    Current Behavioral Concerns: n/a    Education Provided to patient: FARHAT HANNA called and spoke with patient; introduced self, discussed role of Care Coordination, and explained reason for call.     Pain  Pain (GOAL):: No  Health Maintenance Reviewed: Up to date  Clinical Pathway: None    Medication Management:  Medication review status: did not discuss     Functional Status:  Dependent ADLs:: Independent  Dependent IADLs:: Independent  Bed or wheelchair confined:: No  Mobility Status: Independent  Fallen 2 or more times in the past year?: No  Any fall with injury in the past year?: No    Living Situation:  Current living arrangement:: I live in a private home  [facility]    Lifestyle & Psychosocial Needs:    Social Drivers of Health     Food Insecurity: Not on file   Depression: Not at risk (5/2/2023)    PHQ-2     PHQ-2 Score: 2   Housing Stability: Not on file   Tobacco Use: Low Risk  (3/8/2024)    Patient History     Smoking Tobacco Use: Never     Smokeless Tobacco Use: Never     Passive Exposure: Never   Financial Resource Strain: Not on file   Alcohol Use: Not on file   Transportation Needs: Not on file   Physical Activity: Not on file   Interpersonal Safety: Not on file   Stress: Not on file   Social  Connections: Not on file   Health Literacy: Not on file     Diet:: Regular  Inadequate nutrition (GOAL):: No  Tube Feeding: No  Inadequate activity/exercise (GOAL):: No  Significant changes in sleep pattern (GOAL): No  Transportation means:: Regular car     Nondenominational or spiritual beliefs that impact treatment:: No  Mental health DX:: No  Mental health management concern (GOAL):: No  Chemical Dependency Status: No Current Concerns        Resources and Interventions:  Current Resources:      Community Resources: None, [facility]  Supplies Currently Used at Home: None  Equipment Currently Used at Home: none  Employment Status: employed full-time         Advance Care Plan/Directive  Advanced Care Plans/Directives on file:: No    Referrals Placed: Community Resources     Care Plan:  Care Plan: General       Problem: General Assistance       Goal: Insurance Card Replacement       Start Date: 10/29/2024 Expected End Date: 1/29/2025    Note:     Barriers: language barrier   Strengths: strong advocate for self  Patient expressed understanding of goal: yes   Action steps to achieve this goal:  1. I will connect with [facility] to find out about getting cards replaced.   2. I will connect with the SW CC with any questions or concerns.                                   Patient/Caregiver understanding: Patient verbalized understanding, engaged in AIDET communication during patient encounter.      Outreach Frequency: monthly, more frequently as needed      Assessment: SW CC outreached and spoke with the pt in regards to referral. Pt shared that she hasn't received the insurance cards for their plan,and shared that she has moved so wonders if they don't have current address. Needs new cards mailed out. SW CC to connect with [facility] for next steps.  Plan: SW CC to outreach and follow up with the pt for next steps. CYNDI CC to outreach in 1-2 weeks.     Laura Hylton CYNDI  Social Work Care Coordinator - [facility]  [facility]  Care Coordination  [email or web address]  Cell Phone: [phone]  Gender pronouns: she/her  Employed by [employer]

## 2024-11-11 ENCOUNTER — PATIENT OUTREACH (OUTPATIENT)
Dept: CARE COORDINATION | Facility: CLINIC | Age: 29
End: 2024-11-11

## 2024-11-11 ENCOUNTER — HOSPITAL ENCOUNTER (EMERGENCY)
Facility: CLINIC | Age: 29
Discharge: HOME | End: 2024-11-11
Attending: EMERGENCY MEDICINE | Admitting: EMERGENCY MEDICINE
Payer: COMMERCIAL

## 2024-11-11 VITALS
SYSTOLIC BLOOD PRESSURE: 117 MMHG | DIASTOLIC BLOOD PRESSURE: 67 MMHG | HEIGHT: 64 IN | HEART RATE: 80 BPM | RESPIRATION RATE: 14 BRPM | OXYGEN SATURATION: 98 % | TEMPERATURE: 97.5 F | WEIGHT: 214 LBS | BODY MASS INDEX: 36.54 KG/M2

## 2024-11-11 DIAGNOSIS — N39.0 URINARY TRACT INFECTION IN FEMALE: ICD-10-CM

## 2024-11-11 LAB
ALBUMIN SERPL BCG-MCNC: 4.3 G/DL (ref 3.5–5.2)
ALBUMIN UR-MCNC: 10 MG/DL
ALP SERPL-CCNC: 124 U/L (ref 40–150)
ALT SERPL W P-5'-P-CCNC: 22 U/L (ref 0–50)
ANION GAP SERPL CALCULATED.3IONS-SCNC: 10 MMOL/L (ref 7–15)
APPEARANCE UR: ABNORMAL
AST SERPL W P-5'-P-CCNC: 18 U/L (ref 0–45)
BACTERIA #/AREA URNS HPF: ABNORMAL /HPF
BASOPHILS # BLD AUTO: 0.1 10E3/UL (ref 0–0.2)
BASOPHILS NFR BLD AUTO: 1 %
BILIRUB SERPL-MCNC: 0.2 MG/DL
BILIRUB UR QL STRIP: NEGATIVE
BUN SERPL-MCNC: 7.6 MG/DL (ref 6–20)
CALCIUM SERPL-MCNC: 8.8 MG/DL (ref 8.8–10.4)
CHLORIDE SERPL-SCNC: 105 MMOL/L (ref 98–107)
COLOR UR AUTO: ABNORMAL
CREAT SERPL-MCNC: 0.58 MG/DL (ref 0.51–0.95)
EGFRCR SERPLBLD CKD-EPI 2021: >90 ML/MIN/1.73M2
EOSINOPHIL # BLD AUTO: 0.4 10E3/UL (ref 0–0.7)
EOSINOPHIL NFR BLD AUTO: 5 %
ERYTHROCYTE [DISTWIDTH] IN BLOOD BY AUTOMATED COUNT: 14.4 % (ref 10–15)
FLUAV RNA SPEC QL NAA+PROBE: NEGATIVE
FLUBV RNA RESP QL NAA+PROBE: NEGATIVE
GLUCOSE SERPL-MCNC: 128 MG/DL (ref 70–99)
GLUCOSE UR STRIP-MCNC: NEGATIVE MG/DL
HCG SERPL QL: NEGATIVE
HCO3 SERPL-SCNC: 22 MMOL/L (ref 22–29)
HCT VFR BLD AUTO: 39 % (ref 35–47)
HGB BLD-MCNC: 12.4 G/DL (ref 11.7–15.7)
HGB UR QL STRIP: NEGATIVE
IMM GRANULOCYTES # BLD: 0 10E3/UL
IMM GRANULOCYTES NFR BLD: 0 %
KETONES UR STRIP-MCNC: NEGATIVE MG/DL
LEUKOCYTE ESTERASE UR QL STRIP: ABNORMAL
LIPASE SERPL-CCNC: 25 U/L (ref 13–60)
LYMPHOCYTES # BLD AUTO: 2.4 10E3/UL (ref 0.8–5.3)
LYMPHOCYTES NFR BLD AUTO: 31 %
MAGNESIUM SERPL-MCNC: 2 MG/DL (ref 1.7–2.3)
MCH RBC QN AUTO: 24.6 PG (ref 26.5–33)
MCHC RBC AUTO-ENTMCNC: 31.8 G/DL (ref 31.5–36.5)
MCV RBC AUTO: 77 FL (ref 78–100)
MONOCYTES # BLD AUTO: 0.4 10E3/UL (ref 0–1.3)
MONOCYTES NFR BLD AUTO: 5 %
MUCOUS THREADS #/AREA URNS LPF: PRESENT /LPF
NEUTROPHILS # BLD AUTO: 4.6 10E3/UL (ref 1.6–8.3)
NEUTROPHILS NFR BLD AUTO: 58 %
NITRATE UR QL: NEGATIVE
NRBC # BLD AUTO: 0 10E3/UL
NRBC BLD AUTO-RTO: 0 /100
PH UR STRIP: 6 [PH] (ref 5–7)
PLATELET # BLD AUTO: 307 10E3/UL (ref 150–450)
POTASSIUM SERPL-SCNC: 3.7 MMOL/L (ref 3.4–5.3)
PROT SERPL-MCNC: 7.9 G/DL (ref 6.4–8.3)
RBC # BLD AUTO: 5.04 10E6/UL (ref 3.8–5.2)
RBC URINE: 1 /HPF
RSV RNA SPEC NAA+PROBE: NEGATIVE
SARS-COV-2 RNA RESP QL NAA+PROBE: NEGATIVE
SODIUM SERPL-SCNC: 137 MMOL/L (ref 135–145)
SP GR UR STRIP: 1.02 (ref 1–1.03)
SQUAMOUS EPITHELIAL: 14 /HPF
TSH SERPL DL<=0.005 MIU/L-ACNC: 2.7 UIU/ML (ref 0.3–4.2)
UROBILINOGEN UR STRIP-MCNC: <2 MG/DL
WBC # BLD AUTO: 7.9 10E3/UL (ref 4–11)
WBC URINE: 15 /HPF

## 2024-11-11 PROCEDURE — 96374 THER/PROPH/DIAG INJ IV PUSH: CPT

## 2024-11-11 PROCEDURE — 87088 URINE BACTERIA CULTURE: CPT | Performed by: EMERGENCY MEDICINE

## 2024-11-11 PROCEDURE — 83690 ASSAY OF LIPASE: CPT | Performed by: EMERGENCY MEDICINE

## 2024-11-11 PROCEDURE — 36415 COLL VENOUS BLD VENIPUNCTURE: CPT | Performed by: EMERGENCY MEDICINE

## 2024-11-11 PROCEDURE — 87637 SARSCOV2&INF A&B&RSV AMP PRB: CPT

## 2024-11-11 PROCEDURE — 85025 COMPLETE CBC W/AUTO DIFF WBC: CPT | Performed by: EMERGENCY MEDICINE

## 2024-11-11 PROCEDURE — 250N000011 HC RX IP 250 OP 636: Performed by: EMERGENCY MEDICINE

## 2024-11-11 PROCEDURE — 83735 ASSAY OF MAGNESIUM: CPT | Performed by: EMERGENCY MEDICINE

## 2024-11-11 PROCEDURE — 80053 COMPREHEN METABOLIC PANEL: CPT | Performed by: EMERGENCY MEDICINE

## 2024-11-11 PROCEDURE — 250N000013 HC RX MED GY IP 250 OP 250 PS 637: Performed by: EMERGENCY MEDICINE

## 2024-11-11 PROCEDURE — 99284 EMERGENCY DEPT VISIT MOD MDM: CPT | Mod: 25

## 2024-11-11 PROCEDURE — 81003 URINALYSIS AUTO W/O SCOPE: CPT | Performed by: EMERGENCY MEDICINE

## 2024-11-11 PROCEDURE — 84443 ASSAY THYROID STIM HORMONE: CPT | Performed by: EMERGENCY MEDICINE

## 2024-11-11 PROCEDURE — 84703 CHORIONIC GONADOTROPIN ASSAY: CPT | Performed by: EMERGENCY MEDICINE

## 2024-11-11 PROCEDURE — 250N000013 HC RX MED GY IP 250 OP 250 PS 637

## 2024-11-11 RX ORDER — ACETAMINOPHEN 500 MG
1000 TABLET ORAL ONCE
Status: COMPLETED | OUTPATIENT
Start: 2024-11-11 | End: 2024-11-11

## 2024-11-11 RX ORDER — FAMOTIDINE 20 MG/1
20 TABLET, FILM COATED ORAL 2 TIMES DAILY PRN
Qty: 10 TABLET | Refills: 0 | Status: SHIPPED | OUTPATIENT
Start: 2024-11-11

## 2024-11-11 RX ORDER — MAGNESIUM HYDROXIDE/ALUMINUM HYDROXICE/SIMETHICONE 120; 1200; 1200 MG/30ML; MG/30ML; MG/30ML
15 SUSPENSION ORAL ONCE
Status: COMPLETED | OUTPATIENT
Start: 2024-11-11 | End: 2024-11-11

## 2024-11-11 RX ADMIN — ALUMINUM HYDROXIDE, MAGNESIUM HYDROXIDE, AND SIMETHICONE 15 ML: 1200; 120; 1200 SUSPENSION ORAL at 09:34

## 2024-11-11 RX ADMIN — FAMOTIDINE 20 MG: 10 INJECTION, SOLUTION INTRAVENOUS at 09:57

## 2024-11-11 RX ADMIN — ACETAMINOPHEN 1000 MG: 500 TABLET ORAL at 09:56

## 2024-11-11 ASSESSMENT — COLUMBIA-SUICIDE SEVERITY RATING SCALE - C-SSRS
1. IN THE PAST MONTH, HAVE YOU WISHED YOU WERE DEAD OR WISHED YOU COULD GO TO SLEEP AND NOT WAKE UP?: NO
2. HAVE YOU ACTUALLY HAD ANY THOUGHTS OF KILLING YOURSELF IN THE PAST MONTH?: NO
6. HAVE YOU EVER DONE ANYTHING, STARTED TO DO ANYTHING, OR PREPARED TO DO ANYTHING TO END YOUR LIFE?: NO

## 2024-11-11 ASSESSMENT — ACTIVITIES OF DAILY LIVING (ADL)
ADLS_ACUITY_SCORE: 0
DEPENDENT_IADLS:: INDEPENDENT
ADLS_ACUITY_SCORE: 0

## 2024-11-11 NOTE — ED PROVIDER NOTES
"Emergency Department Midlevel Supervisory Note     I had a face to face encounter with this patient seen by the Advanced Practice Provider (CINTHIA). I personally made/approved the management plan and take responsibility for the patient management. I personally saw patient and performed a substantive portion of the visit including all aspects of the medical decision making.     ED Course:  8:48 AM   Sugey Nayak PA-C  staffed patient with me. I agree with their assessment and plan of management, and I will see the patient.  9:19 I met with the patient to introduce myself, gather additional history, perform my initial exam, and discuss the plan.     Brief HPI:     Vernell Christensen is a 29 year old female who presents for evaluation of abdominal pain and numbness.     Patient stated that she woke up this morning with abdominal pain. Patient said that this pain woke her up and she described it as being in her upper abdomen. She has some nausea with this pain, but has not vomited. Patient also mentioned that her tongue became numb three days ago. She does not know what caused this and can still talk and move it. She also endorses a headache, located over her temples, that started this morning. She reported that she has a lot of anxiety recently that is caused from her kids being in [place] while she is here in [place]. She said that she thinks that this could be causing her headache along with not getting enough sleep.     I, Jacob Méndez, am serving as a scribe to document services personally performed by Mi Desai MD, based on my observations and the provider's statements to me.   I, Mi Desai MD attest that Jacob Méndez was acting in a scribe capacity, has observed my performance of the services and has documented them in accordance with my direction.    Brief Physical Exam: /80   Pulse 92   Temp 97.5  F (36.4  C) (Temporal)   Resp 14   Ht 1.626 m (5' 4\")   Wt 97.1 kg (214 lb)   LMP 10/10/2024  "  SpO2 98%   BMI 36.73 kg/m    Constitutional:  Alert, in no acute distress  EYES: Conjunctivae clear  HENT:  Atraumatic  Respiratory:  Respirations even, unlabored, in no acute respiratory distress  Cardiovascular:  Regular rate and rhythm, good peripheral perfusion  GI: Soft, non-distended, non-tender  Musculoskeletal:  Moves all 4 extremities equally, grossly symmetrical strength  Integument: Warm & dry. No appreciable rash, erythema.  Neurologic:  Alert & oriented, speech clear and fluent, no focal deficits noted  Psych: Normal mood and affect       MDM:  ***       No diagnosis found.    Consults:  I discussed the patient with *** who recommends ***    Labs and Imaging:       I have reviewed the relevant laboratory studies above.    I independently interpreted the following imaging study(s):   ***    EKG: I reviewed and independently interpreted the patient's EKG, with comments made as listed below. Please see scanned EKG for full report.   ***    Procedures:  I was present for the key portions of procedures documented in CINTHIA/midlevel note, see midlevel note for further details.    Mi Desai MD  [facility] EMERGENCY ROOM  [address] [address] [phone]

## 2024-11-11 NOTE — PROGRESS NOTES
Clinic Care Coordination Contact  Follow Up Progress Note      Assessment: CYNDI CC outreached and spoke with the pt for follow up. CYNDI CC shared that they had called [facility] to inquire about getting new cards, and that they have a line dedicated to [place] speaking families.     Care Gaps:    Health Maintenance Due   Topic Date Due    ADVANCE CARE PLANNING  Never done    YEARLY PREVENTIVE VISIT  04/26/2022    HEPATITIS B IMMUNIZATION (2 of 3 - 19+ 3-dose series) 05/30/2023    PHQ-2 (once per calendar year)  01/01/2024    PAP  04/26/2024    INFLUENZA VACCINE (1) 09/01/2024    COVID-19 Vaccine (4 - 2024-25 season) 09/01/2024       Currently there are no Care Gaps.    Care Plans  Care Plan: General       Problem: General Assistance       Goal: Insurance Card Replacement       Start Date: 10/29/2024 Expected End Date: 1/29/2025    Note:     Barriers: language barrier   Strengths: strong advocate for self  Patient expressed understanding of goal: yes   Action steps to achieve this goal:  1. I will connect with [facility] to find out about getting cards replaced.   2. I will connect with the CYNDI CC with any questions or concerns.                                   Intervention/Education provided during outreach: Patient verbalized understanding, engaged in AIDET communication during patient encounter.       Outreach Frequency: monthly, more frequently as needed    Plan:   Pt to follow up with [facility]. CYNDI CC shared the contact info for the insurance company to inquire about getting new cards.   Care Coordinator will follow up in 1 month.     Laura Hylton [facility]  Social Work Care Coordinator - [facility]  Care Coordination  [email or web address]  Cell Phone: [phone]  Gender pronouns: she/her  Employed by [employer]

## 2024-11-11 NOTE — ED TRIAGE NOTES
Pt c/o mid abdominal pain over night, denies vomiting, +nausea. Pt c/o headache to temple area. Pt states has h ad numbness to tongue for 2 days as well to the entire tongue. Denies fevers, denies cough.

## 2024-11-11 NOTE — DISCHARGE INSTRUCTIONS
As we discussed today, I have prescribed 2 different medications to take for your stomach pain/acid reflux symptoms.  It does appear that you have had acid reflux in the past.  I have prescribed 30 days of Prilosec to your pharmacy.  You can take this every morning 30 minutes before you eat.  If you continue to develop symptoms throughout the day, you can take the Pepcid medicine as needed up to 2 times per day.  I would recommend following up with your regular doctor within the next 2 weeks for further discussion and management of your symptoms.  In the meantime, if you develop emergency symptoms such as severe worsening abdominal pain, other emergency symptoms, you can return to the emergency department for reevaluation.

## 2024-11-11 NOTE — ED PROVIDER NOTES
"Emergency Department Encounter   NAME: Vernlel Christensen ; AGE: 29 year old female ; YOB: 1995 ; MRN: [id number] ; PCP: [facility]   ED PROVIDER: Sugey Nyaak PA-C    Evaluation Date & Time:   11/11/2024  8:47 AM    CHIEF COMPLAINT:  Abdominal Pain and Numbness      Impression and Plan   MDM: Vernell Christensen is a 29 year old female who presents to the ED for evaluation of abdominal pain and headache. Vitals within normal limits. Physical exam reveals some mild tenderness to palpation of epigastric area. Differential diagnosis includes gastritis, acid reflux, pancreatitis, UTI. Differential of headache includes tension headache, intracranial bleed, migraine.   Lab work including CBC, CMP, lipase, magnesium, TSH all unremarkable.  Influenza, COVID-negative.  hCG negative.  UA does have some white blood cells and leukocytes, however, UA is contaminated with squamous cells.  Patient is not having any urinary symptoms such as urgency, frequency, blood in the urine. Low suspicion of acute infections without active symptoms so antibiotics will not be prescribed at this time.   Patient is given famotidine and maalox for abdominal pain.  She is given Tylenol for headache.  On reexamination, she states that she no longer has any belly pain.  She has an ongoing mild headache although it is improved since receiving the medication.  She does state this headache feels similar to previous when she does not get a lot of sleep. Did consider head imaging with tongue numbness symptom. However, neurological exam is normal. Headache does improve with tylenol and is similar to previous headaches. Patient states the numbness is less of numbness but rather a \"tongue heaviness.\"  She is speaking normally and there is no tongue enlargement or swelling. For these reasons no further workup of tongue swelling or numbness pursued at this time.     Considered further workup with abdominal pain including abdominal CT " although patient's symptoms improved with medication interventions. No elevated WBCs. Low suspicion for acute abdominal emergency such as obstruction with normal bowel movements.     Discussed plan for discharge with use of Prilosec and famotidine for potential acid reflux symptoms.  Upon chart review, it appears the patient has been on Prilosec in the past for acid reflux.  She is not currently taking it. Prescribed 30 days of prilosec to take daily and famotidine as needed for symptoms. Discussed follow up with her primary care provider within the next 1-2 weeks.     Discussed return precautions including acute worsening of abdominal pain or worsening of headache including vision changes or acute numbness or weakness of limbs. Patient understands and is discharged in stable condition.     Medical Decision Making  Obtained supplemental history:Supplemental history obtained?: No  Reviewed external records: External records reviewed?: Documented in chart  Care impacted by chronic illness:Documented in Chart  Did you consider but not order tests?: Work up considered but not performed and documented in chart, if applicable  Did you interpret images independently?: Independent interpretation of ECG and images noted in documentation, when applicable.  Consultation discussion with other provider:Did you involve another provider (consultant, , pharmacy, etc.)?: I discussed the care with another health care provider, see documentation for details.  Discharge. I prescribed additional prescription strength medication(s) as charted. See documentation for any additional details.    MIPS: Not Applicable      ED COURSE:  8:51 AM I met and introduced myself to the patient. I gathered initial history and performed my physical exam. We discussed plan for initial workup.  I have staffed the patient with Dr. Desai, ED MD, who has evaluated the patient and agrees with all aspects of today's care.  I rechecked the patient and  discussed results, discharge, follow up, and reasons to return to the ED.     At the conclusion of the encounter I discussed the results of all the tests and the disposition. The questions were answered. The patient or family acknowledged understanding and was agreeable with the care plan.    FINAL IMPRESSION:    ICD-10-CM    1. Urinary tract infection in female  N39.0         MEDICATIONS GIVEN IN THE EMERGENCY DEPARTMENT:  Medications   alum & mag hydroxide-simethicone (MAALOX) suspension 15 mL (15 mLs Oral $Given 11/11/24 0934)   famotidine (PEPCID) injection 20 mg (20 mg Intravenous $Given 11/11/24 0957)   acetaminophen (TYLENOL) tablet 1,000 mg (1,000 mg Oral $Given 11/11/24 0956)     NEW PRESCRIPTIONS STARTED AT TODAY'S ED VISIT:  Discharge Medication List as of 11/11/2024 12:29 PM        START taking these medications    Details   famotidine (PEPCID) 20 MG tablet Take 1 tablet (20 mg) by mouth 2 times daily as needed (take as needed for abdominal pain / acid reflux symptoms)., Disp-10 tablet, R-0, E-Prescribe             HPI   Use of Intrepreter: N/A     Vernell Christensen is a 29 year old female with a pertinent history of diabetes who presents to the ED by walking for evaluation of mid abdominal pain overnight. No vomiting but does have nausea. Also having numbness for 2 days to the entire tongue. No fevers. No cough. Having headache to the temporal area.      Patient stated that she woke up this morning with abdominal pain. Patient said that this pain woke her up and she described it as being in her upper abdomen. She has some nausea with this pain, but has not vomited. Patient also mentioned that her tongue became numb three days ago. She does not know what caused this and can still talk and move it. She also endorses a headache, located over her temples, that started this morning. She reported that she has a lot of anxiety recently that is caused from her kids being in [place] while she is here in [place]. She  "said that she thinks that this could be causing her headache along with not getting enough sleep.     She mentioned that she has been having right arm numbness for three years. She said that this is worse after working with her arms. She does not have any increased numbness recently.     Patient denied any diarrhea, constipation, sick contacts, weakness, new dizziness, cough, chest pain, urinary symptoms, chance of pregnancy, and did not mention any other symptoms.         Medical History     Past Medical History:   Diagnosis Date    Cholestasis during pregnancy (H28) 6/8/2018    Diabetes mellitus (H)     Symptomatic cholelithiasis 9/4/2020       Past Surgical History:   Procedure Laterality Date    LAPAROSCOPIC CHOLECYSTECTOMY N/A 09/04/2020    Procedure: CHOLECYSTECTOMY, LAPAROSCOPIC;  Surgeon: Joao Willett MD;  Location: [facility];  Service: General       Family History   Problem Relation Age of Onset    Hypertension Maternal Grandmother     Diabetes Maternal Grandmother     Diabetes Maternal Grandfather     Diabetes Paternal Grandmother     Diabetes Maternal Aunt     Diabetes Maternal Aunt        Social History     Tobacco Use    Smoking status: Never     Passive exposure: Never    Smokeless tobacco: Never   Vaping Use    Vaping status: Never Used   Substance Use Topics    Alcohol use: No    Drug use: No       famotidine (PEPCID) 20 MG tablet  omeprazole (PRILOSEC) 20 MG DR capsule  metoclopramide (REGLAN) 5 MG tablet  pyridOXINE (VITAMIN B6) 25 MG tablet  sucralfate (CARAFATE) 1 GM tablet          Physical Exam     First Vitals:  Patient Vitals for the past 24 hrs:   BP Temp Temp src Pulse Resp SpO2 Height Weight   11/11/24 1000 -- -- -- 80 -- 98 % -- --   11/11/24 0915 117/67 -- -- 88 -- 97 % -- --   11/11/24 0900 129/71 -- -- 86 -- 96 % -- --   11/11/24 0840 -- -- -- -- -- -- -- 97.1 kg (214 lb)   11/11/24 0838 139/80 97.5  F (36.4  C) Temporal 92 14 98 % 1.626 m (5' 4\") --         PHYSICAL " EXAM:   Physical Exam    Constitutional: alert,  no acute distress,  not ill-appearing  Head: normocephalic, atraumatic  Eyes: EOM intact   Mouth/Throat: moist,no tongue enlargement  Eyes: PERRL, EOM intact   Neck: ROM normal  Cardio: regular rate, regular rate, no murmurs   Pulmonary: effort normal, lung sounds normal, no wheezing, crackles, or rales    Abdominal: flat, no distention, mildly tender to palpation of epigastric area, no other tenderness, no guarding   MSK: no obvious swelling or deformity  Skin: no visible rashes or erythema   Neuro: no gross focal deficit, acting per baseline, no cranial nerve abnormality, no weakness or sensation deficit, normal tongue movements  Psychiatric: mood and behavior within normal limit    Results     LAB:  All pertinent labs reviewed and interpreted  Labs Ordered and Resulted from Time of ED Arrival to Time of ED Departure   COMPREHENSIVE METABOLIC PANEL - Abnormal       Result Value    Sodium 137      Potassium 3.7      Carbon Dioxide (CO2) 22      Anion Gap 10      Urea Nitrogen 7.6      Creatinine 0.58      GFR Estimate >90      Calcium 8.8      Chloride 105      Glucose 128 (*)     Alkaline Phosphatase 124      AST 18      ALT 22      Protein Total 7.9      Albumin 4.3      Bilirubin Total 0.2     ROUTINE UA WITH MICROSCOPIC REFLEX TO CULTURE - Abnormal    Color Urine Light Yellow      Appearance Urine Turbid (*)     Glucose Urine Negative      Bilirubin Urine Negative      Ketones Urine Negative      Specific Gravity Urine 1.023      Blood Urine Negative      pH Urine 6.0      Protein Albumin Urine 10 (*)     Urobilinogen Urine <2.0      Nitrite Urine Negative      Leukocyte Esterase Urine 250 Kadeem/uL (*)     Bacteria Urine Few (*)     Mucus Urine Present (*)     RBC Urine 1      WBC Urine 15 (*)     Squamous Epithelials Urine 14 (*)    CBC WITH PLATELETS AND DIFFERENTIAL - Abnormal    WBC Count 7.9      RBC Count 5.04      Hemoglobin 12.4      Hematocrit 39.0       MCV 77 (*)     MCH 24.6 (*)     MCHC 31.8      RDW 14.4      Platelet Count 307      % Neutrophils 58      % Lymphocytes 31      % Monocytes 5      % Eosinophils 5      % Basophils 1      % Immature Granulocytes 0      NRBCs per 100 WBC 0      Absolute Neutrophils 4.6      Absolute Lymphocytes 2.4      Absolute Monocytes 0.4      Absolute Eosinophils 0.4      Absolute Basophils 0.1      Absolute Immature Granulocytes 0.0      Absolute NRBCs 0.0     LIPASE - Normal    Lipase 25     HCG QUALITATIVE PREGNANCY - Normal    hCG Serum Qualitative Negative     MAGNESIUM - Normal    Magnesium 2.0     TSH WITH FREE T4 REFLEX - Normal    TSH 2.70     INFLUENZA A/B, RSV, & SARS-COV2 PCR - Normal    Influenza A PCR Negative      Influenza B PCR Negative      RSV PCR Negative      SARS CoV2 PCR Negative     URINE CULTURE        RADIOLOGY:  No orders to display       PROCEDURES:  None       I, Jacob Méndez, am serving as a scribe to document services personally performed by Sugey Nayak PA-C, based on my observation and the provider's statements to me. I, Sugey Nayak PA-C attest that Jacob Méndez is acting in a scribe capacity, has observed my performance of the services and has documented them in accordance with my direction.       Sugey Nayak PA-C   Emergency Medicine   [facility] EMERGENCY ROOM       Sugey Nayak PA-C  11/11/24 4202

## 2024-11-12 LAB
BACTERIA UR CULT: ABNORMAL
BACTERIA UR CULT: ABNORMAL

## 2024-11-15 ENCOUNTER — OFFICE VISIT (OUTPATIENT)
Dept: URGENT CARE | Facility: URGENT CARE | Age: 29
End: 2024-11-15
Payer: COMMERCIAL

## 2024-11-15 VITALS
RESPIRATION RATE: 16 BRPM | TEMPERATURE: 98 F | SYSTOLIC BLOOD PRESSURE: 114 MMHG | BODY MASS INDEX: 37.25 KG/M2 | DIASTOLIC BLOOD PRESSURE: 76 MMHG | WEIGHT: 217 LBS | OXYGEN SATURATION: 98 % | HEART RATE: 85 BPM

## 2024-11-15 DIAGNOSIS — R10.13 ABDOMINAL PAIN, EPIGASTRIC: Primary | ICD-10-CM

## 2024-11-15 PROCEDURE — 99214 OFFICE O/P EST MOD 30 MIN: CPT | Performed by: FAMILY MEDICINE

## 2024-11-15 NOTE — PROGRESS NOTES
"SUBJECTIVE  HPI: Vernell Christensen is a 29 year old female  who presents with the CC of abdominal/pelvic pain.   Pain is located in the epigastric area, with radiation to None    The pain is characterized as \"pain\".    Pain has been present for 1 week(s) and is slowly improving.     Seen at ED started Prilosec with some relief    Past Medical History:   Diagnosis Date    Cholestasis during pregnancy 6/8/2018    Diabetes mellitus (H)     Symptomatic cholelithiasis 9/4/2020     No Known Allergies  Social History     Tobacco Use    Smoking status: Never     Passive exposure: Never    Smokeless tobacco: Never   Substance Use Topics    Alcohol use: No       ROS:CONSTITUTIONAL:NEGATIVE for fever, chills, change in weight    EXAMINATION:  /76   Pulse 85   Temp 98  F (36.7  C) (Tympanic)   Resp 16   Wt 98.4 kg (217 lb)   LMP 10/10/2024   SpO2 98%   BMI 37.25 kg/m  GENERAL APPEARANCE: healthy, alert and no distress  ABDOMEN: tenderness moderate epigastric    GI cocktail with relief, pain from 6 to 2      ICD-10-CM    1. Abdominal pain, epigastric  R10.13 lidocaine (viscous) (XYLOCAINE) 2 % 15 mL, alum & mag hydroxide-simethicone (MAALOX) 15 mL GI Cocktail     Helicobacter pylori Antigen Stool        Double up Prilosec and OTC tums  F/U PCP/IM/FP, ED if worse    "

## 2024-11-20 ENCOUNTER — OFFICE VISIT (OUTPATIENT)
Dept: URGENT CARE | Facility: URGENT CARE | Age: 29
End: 2024-11-20
Payer: COMMERCIAL

## 2024-11-20 VITALS
SYSTOLIC BLOOD PRESSURE: 137 MMHG | DIASTOLIC BLOOD PRESSURE: 88 MMHG | TEMPERATURE: 97.7 F | WEIGHT: 217 LBS | BODY MASS INDEX: 37.25 KG/M2 | OXYGEN SATURATION: 96 % | HEART RATE: 93 BPM

## 2024-11-20 DIAGNOSIS — R20.2 PARESTHESIAS: Primary | ICD-10-CM

## 2024-11-20 PROCEDURE — 99214 OFFICE O/P EST MOD 30 MIN: CPT | Performed by: PHYSICIAN ASSISTANT

## 2024-11-20 RX ORDER — ONDANSETRON 4 MG/1
TABLET, ORALLY DISINTEGRATING ORAL
COMMUNITY

## 2024-11-20 RX ORDER — PAROXETINE 10 MG/1
TABLET, FILM COATED ORAL
COMMUNITY
Start: 2024-10-18

## 2024-11-20 RX ORDER — PREDNISONE 20 MG/1
20 TABLET ORAL 2 TIMES DAILY
Qty: 10 TABLET | Refills: 0 | Status: SHIPPED | OUTPATIENT
Start: 2024-11-20 | End: 2024-11-25

## 2024-11-20 RX ORDER — METFORMIN HYDROCHLORIDE 500 MG/1
TABLET, EXTENDED RELEASE ORAL
COMMUNITY
Start: 2024-11-19

## 2024-11-20 NOTE — PROGRESS NOTES
SUBJECTIVE:  Chief Complaint   Patient presents with    Urgent Care     Right hand wrist pain this has been going on for a few years she need note for doctor and if she needs any restrictions.She has never had xray or a real diagnoses     Vernell Christensen is a 29 year old female presents with a chief complaint of acute on chronic wrist pain with tingling in 3,4 digits.  Seems to get exacerbated with her work.   Denies constant pain, numbness, weakness.     Past Medical History:   Diagnosis Date    Cholestasis during pregnancy 6/8/2018    Diabetes mellitus (H)     Symptomatic cholelithiasis 9/4/2020     Current Outpatient Medications   Medication Sig Dispense Refill    predniSONE (DELTASONE) 20 MG tablet Take 1 tablet (20 mg) by mouth 2 times daily for 5 days. 10 tablet 0    metFORMIN (GLUCOPHAGE XR) 500 MG 24 hr tablet 1 tablet with evening meal Orally Once a day for 90 days (Patient not taking: Reported on 11/20/2024)      ondansetron (ZOFRAN ODT) 4 MG ODT tab 1 tablet on the tongue and allow to dissolve Orally Once a day (Patient not taking: Reported on 11/20/2024)      PARoxetine (PAXIL) 10 MG tablet 1 tablet in the morning Orally Once a day for 90 days (Patient not taking: Reported on 11/20/2024)       Social History     Tobacco Use    Smoking status: Never     Passive exposure: Never    Smokeless tobacco: Never   Substance Use Topics    Alcohol use: No       ROS:  Review of systems negative except as stated above.    EXAM:   /88   Pulse 93   Temp 97.7  F (36.5  C) (Tympanic)   Wt 98.4 kg (217 lb)   LMP 10/10/2024   SpO2 96%   BMI 37.25 kg/m    Gen: healthy,alert,no distress  Extremity: fingers, hand wrist has FROM.   There is not compromise to the distal circulation.  Pulses are +2 and CRT is brisk  GENERAL APPEARANCE: healthy, alert and no distress  CHEST: clear to auscultation  CV: regular rate and rhythm  EXTREMITIES: peripheral pulses normal  MS: no gross deformities noted, no evidence of  inflammation in joints, FROM in all extremities.  SKIN: no suspicious lesions or rashes  NEURO: Normal strength and tone, sensory exam grossly normal, mentation intact and speech normal        ASSESSMENT:   (R20.2) Paresthesias  (primary encounter diagnosis)  Comment: seems better with wrist splint  Plan: predniSONE (DELTASONE) 20 MG tablet, Orthopedic         Referral        Continue splint, follow up if not resolving

## 2024-11-22 NOTE — PROGRESS NOTES
ASSESSMENT & PLAN    Vernell was seen today for numbness.    Diagnoses and all orders for this visit:    Carpal tunnel syndrome of right wrist  -     Hand Therapy Referral; Future    Paresthesias  -     Orthopedic  Referral      Based on the physical exam patient has symptoms that are consistent with carpal tunnel.  Will have patient continue to wear the brace.  She will schedule hand therapy and use anti-inflammatories as needed.  Follow-up in 6 to 8 weeks if there is no improvement.  Can consider EMG at future visits.              Chiki Acevedo MD  [facility] SPORTS MEDICINE [facility]    -----------------------------------------------------------------------------------------      SUBJECTIVE  Vernell Christensen is a/an 29 year old who is experiencing right wrist numbness.     Reason for Visit:  Injured/painful/body part: Right wrist   What are your symptoms: Numbness radiating from hand to shoulder  Date of injury/Onset: 2 weeks  Cause: Reports insidious onset without acute precipitating event.  History of similar pain: Injury at work with overuse wrist motions.   What makes it better: Brace  What makes it worse: None  What have you done for this problem: MSK Treatments Tried : OTC meds (tylenol, ibuprofen) and Topicals (ice/heat, voltaren)    Previous surgeries: None  Social History/Occupation: Cleaning - [employer]      REVIEW OF SYSTEMS:  Positive ROS was noted in the HPI, otherwise negative.       OBJECTIVE:  Gen: no acute distress  Wrist Exam:    Examination limited to the right  wrist    Inspection:  Wrist appeared grossly normal  No swelling, bruising, or deformity  No thenar/hypothenar atrophy  No heberden's or vale nodes  No open wounds or rashes    TTP:  Distal carpal row: Negative  Proximal carpal row: Negative  Ulnar styloid: Negative  Radial styloid: Negative  Scaphoid: Negative  Scaphoid tubercle: Negative  CMC: Negative  Metacarpals: Negative    ROM:   Extension (70):  normal  Flexion (75): normal  Radial deviation (20): normal  Ulnar deviation (35): normal  MCP/PIP/DIP extension: normal  MCP/PIP/DIP flexion: normal    Strength:  Flexion: 5/5, pain: Negative  Extension: 5/5, pain: Negative    Sensation:  Median distrubution: abnormal  Ulnar distrubution: normal  Radial distrubution: normal  2+ radial pulse    Special Testing:  Phalen's: abnormal  Ulnar Tinel's: normal  Median Tinel's: abnormal  Finkelstein's: abnormal  CMC grind test: [normal  TFCC compression: normal  Carpal Tunnel Compression:abnormal        RADIOLOGY:  None

## 2024-11-25 ENCOUNTER — OFFICE VISIT (OUTPATIENT)
Dept: ORTHOPEDICS | Facility: CLINIC | Age: 29
End: 2024-11-25
Attending: PHYSICIAN ASSISTANT
Payer: COMMERCIAL

## 2024-11-25 DIAGNOSIS — G56.01 CARPAL TUNNEL SYNDROME OF RIGHT WRIST: Primary | ICD-10-CM

## 2024-11-25 DIAGNOSIS — R20.2 PARESTHESIAS: ICD-10-CM

## 2024-11-25 PROCEDURE — 99202 OFFICE O/P NEW SF 15 MIN: CPT | Performed by: STUDENT IN AN ORGANIZED HEALTH CARE EDUCATION/TRAINING PROGRAM

## 2024-11-25 NOTE — LETTER
11/25/2024      Vernell Christensen  [address] [place] [address]      Dear Colleague,    Thank you for referring your patient, Vernell Christensen, to the [facility] SPORTS MEDICINE [facility]. Please see a copy of my visit note below.    ASSESSMENT & PLAN    Vernell was seen today for numbness.    Diagnoses and all orders for this visit:    Carpal tunnel syndrome of right wrist  -     Hand Therapy Referral; Future    Paresthesias  -     Orthopedic  Referral      Based on the physical exam patient has symptoms that are consistent with carpal tunnel.  Will have patient continue to wear the brace.  She will schedule hand therapy and use anti-inflammatories as needed.  Follow-up in 6 to 8 weeks if there is no improvement.  Can consider EMG at future visits.              Chiki Acevedo MD  [facility]    -----------------------------------------------------------------------------------------      SUBJECTIVE  Vernell Christensen is a/an 29 year old who is experiencing right wrist numbness.     Reason for Visit:  Injured/painful/body part: Right wrist   What are your symptoms: Numbness radiating from hand to shoulder  Date of injury/Onset: 2 weeks  Cause: Reports insidious onset without acute precipitating event.  History of similar pain: Injury at work with overuse wrist motions.   What makes it better: Brace  What makes it worse: None  What have you done for this problem: MSK Treatments Tried : OTC meds (tylenol, ibuprofen) and Topicals (ice/heat, voltaren)    Previous surgeries: None  Social History/Occupation: Cleaning - [employer]      REVIEW OF SYSTEMS:  Positive ROS was noted in the HPI, otherwise negative.       OBJECTIVE:  Gen: no acute distress  Wrist Exam:    Examination limited to the right  wrist    Inspection:  Wrist appeared grossly normal  No swelling, bruising, or deformity  No thenar/hypothenar atrophy  No heberden's or vale nodes  No open wounds  or rashes    TTP:  Distal carpal row: Negative  Proximal carpal row: Negative  Ulnar styloid: Negative  Radial styloid: Negative  Scaphoid: Negative  Scaphoid tubercle: Negative  CMC: Negative  Metacarpals: Negative    ROM:   Extension (70): normal  Flexion (75): normal  Radial deviation (20): normal  Ulnar deviation (35): normal  MCP/PIP/DIP extension: normal  MCP/PIP/DIP flexion: normal    Strength:  Flexion: 5/5, pain: Negative  Extension: 5/5, pain: Negative    Sensation:  Median distrubution: abnormal  Ulnar distrubution: normal  Radial distrubution: normal  2+ radial pulse    Special Testing:  Phalen's: abnormal  Ulnar Tinel's: normal  Median Tinel's: abnormal  Finkelstein's: abnormal  CMC grind test: [normal  TFCC compression: normal  Carpal Tunnel Compression:abnormal        RADIOLOGY:  None      Again, thank you for allowing me to participate in the care of your patient.        Sincerely,        Chiki Acevedo MD

## 2024-11-25 NOTE — LETTER
November 25, 2024      Vernell Christensen  [address] [place] [place] [address]        To Whom It May Concern:    Vernell Christensen was seen in our clinic. She may return to work with the following: limited to light duty - must be able to wear the brace for activity for 2 weeks.   Sincerely,      Chiki Acevedo

## 2024-11-25 NOTE — PATIENT INSTRUCTIONS
1. Carpal tunnel syndrome of right wrist    2. Paresthesias      Continue to wear the brace  Schedule Hand therapy  Ice / ibuprofen 600mg as needed  Place an ice pack (you can use a bag of frozen vegetables or other commercial products) over the injured area.  To avoid tissue injury from the cold, place a cloth or towel between the ice and the skin.  It is recommended that ice can be applied 3-5 times a day for up to 20 minutes at a time during the first 24 to 72 hours.  Can repeat every 2 hours as needed.  Follow up in 6-8 weeks if no improvement

## 2024-11-26 ENCOUNTER — APPOINTMENT (OUTPATIENT)
Dept: INTERPRETER SERVICES | Facility: CLINIC | Age: 29
End: 2024-11-26
Payer: COMMERCIAL

## 2024-12-01 ENCOUNTER — HEALTH MAINTENANCE LETTER (OUTPATIENT)
Age: 29
End: 2024-12-01

## 2024-12-13 ENCOUNTER — ANESTHESIA EVENT (OUTPATIENT)
Dept: SURGERY | Facility: AMBULATORY SURGERY CENTER | Age: 29
End: 2024-12-13
Payer: COMMERCIAL

## 2024-12-13 RX ORDER — MAGNESIUM SULFATE HEPTAHYDRATE 40 MG/ML
4 INJECTION, SOLUTION INTRAVENOUS ONCE
Status: CANCELLED | OUTPATIENT
Start: 2024-12-13 | End: 2024-12-13

## 2024-12-16 ENCOUNTER — HOSPITAL ENCOUNTER (OUTPATIENT)
Facility: AMBULATORY SURGERY CENTER | Age: 29
Discharge: HOME | End: 2024-12-16
Attending: OBSTETRICS & GYNECOLOGY
Payer: COMMERCIAL

## 2024-12-16 ENCOUNTER — PATIENT OUTREACH (OUTPATIENT)
Dept: CARE COORDINATION | Facility: CLINIC | Age: 29
End: 2024-12-16

## 2024-12-16 ENCOUNTER — ANESTHESIA (OUTPATIENT)
Dept: SURGERY | Facility: AMBULATORY SURGERY CENTER | Age: 29
End: 2024-12-16
Payer: COMMERCIAL

## 2024-12-16 VITALS
RESPIRATION RATE: 15 BRPM | HEIGHT: 64 IN | DIASTOLIC BLOOD PRESSURE: 57 MMHG | OXYGEN SATURATION: 97 % | TEMPERATURE: 96.9 F | HEART RATE: 77 BPM | BODY MASS INDEX: 31.92 KG/M2 | SYSTOLIC BLOOD PRESSURE: 100 MMHG | WEIGHT: 187 LBS

## 2024-12-16 DIAGNOSIS — Z30.2 ENCOUNTER FOR STERILIZATION: ICD-10-CM

## 2024-12-16 LAB
HCG UR QL: NEGATIVE
INTERNAL QC OK POCT: NORMAL
POCT KIT EXPIRATION DATE: NORMAL
POCT KIT LOT NUMBER: NORMAL

## 2024-12-16 RX ORDER — ONDANSETRON 2 MG/ML
4 INJECTION INTRAMUSCULAR; INTRAVENOUS EVERY 30 MIN PRN
Status: DISCONTINUED | OUTPATIENT
Start: 2024-12-16 | End: 2024-12-17 | Stop reason: HOSPADM

## 2024-12-16 RX ORDER — FENTANYL CITRATE 0.05 MG/ML
50 INJECTION, SOLUTION INTRAMUSCULAR; INTRAVENOUS EVERY 5 MIN PRN
Status: DISCONTINUED | OUTPATIENT
Start: 2024-12-16 | End: 2024-12-17 | Stop reason: HOSPADM

## 2024-12-16 RX ORDER — ACETAMINOPHEN 325 MG/1
975 TABLET ORAL ONCE
Status: COMPLETED | OUTPATIENT
Start: 2024-12-16 | End: 2024-12-16

## 2024-12-16 RX ORDER — LIDOCAINE 40 MG/G
CREAM TOPICAL
Status: DISCONTINUED | OUTPATIENT
Start: 2024-12-16 | End: 2024-12-17 | Stop reason: HOSPADM

## 2024-12-16 RX ORDER — HYDROMORPHONE HCL IN WATER/PF 6 MG/30 ML
0.4 PATIENT CONTROLLED ANALGESIA SYRINGE INTRAVENOUS EVERY 5 MIN PRN
Status: DISCONTINUED | OUTPATIENT
Start: 2024-12-16 | End: 2024-12-17 | Stop reason: HOSPADM

## 2024-12-16 RX ORDER — NALOXONE HYDROCHLORIDE 0.4 MG/ML
0.1 INJECTION, SOLUTION INTRAMUSCULAR; INTRAVENOUS; SUBCUTANEOUS
Status: DISCONTINUED | OUTPATIENT
Start: 2024-12-16 | End: 2024-12-17 | Stop reason: HOSPADM

## 2024-12-16 RX ORDER — IBUPROFEN 800 MG/1
800 TABLET, FILM COATED ORAL ONCE
Status: DISCONTINUED | OUTPATIENT
Start: 2024-12-16 | End: 2024-12-17 | Stop reason: HOSPADM

## 2024-12-16 RX ORDER — OXYCODONE HYDROCHLORIDE 5 MG/1
5 TABLET ORAL
Status: COMPLETED | OUTPATIENT
Start: 2024-12-16 | End: 2024-12-16

## 2024-12-16 RX ORDER — BUPIVACAINE HYDROCHLORIDE 2.5 MG/ML
INJECTION, SOLUTION INFILTRATION; PERINEURAL PRN
Status: DISCONTINUED | OUTPATIENT
Start: 2024-12-16 | End: 2024-12-16 | Stop reason: HOSPADM

## 2024-12-16 RX ORDER — OXYCODONE HYDROCHLORIDE 5 MG/1
5 TABLET ORAL
Status: DISCONTINUED | OUTPATIENT
Start: 2024-12-16 | End: 2024-12-17 | Stop reason: HOSPADM

## 2024-12-16 RX ORDER — KETOROLAC TROMETHAMINE 30 MG/ML
INJECTION, SOLUTION INTRAMUSCULAR; INTRAVENOUS PRN
Status: DISCONTINUED | OUTPATIENT
Start: 2024-12-16 | End: 2024-12-16

## 2024-12-16 RX ORDER — PROPOFOL 10 MG/ML
INJECTION, EMULSION INTRAVENOUS PRN
Status: DISCONTINUED | OUTPATIENT
Start: 2024-12-16 | End: 2024-12-16

## 2024-12-16 RX ORDER — OXYCODONE HYDROCHLORIDE 5 MG/1
5-10 TABLET ORAL EVERY 6 HOURS PRN
Qty: 15 TABLET | Refills: 0 | Status: SHIPPED | OUTPATIENT
Start: 2024-12-16

## 2024-12-16 RX ORDER — ACETAMINOPHEN 325 MG/1
975 TABLET ORAL ONCE
Status: DISCONTINUED | OUTPATIENT
Start: 2024-12-16 | End: 2024-12-17 | Stop reason: HOSPADM

## 2024-12-16 RX ORDER — FENTANYL CITRATE 0.05 MG/ML
25 INJECTION, SOLUTION INTRAMUSCULAR; INTRAVENOUS EVERY 5 MIN PRN
Status: DISCONTINUED | OUTPATIENT
Start: 2024-12-16 | End: 2024-12-17 | Stop reason: HOSPADM

## 2024-12-16 RX ORDER — FENTANYL CITRATE 0.05 MG/ML
50 INJECTION, SOLUTION INTRAMUSCULAR; INTRAVENOUS
Status: DISCONTINUED | OUTPATIENT
Start: 2024-12-16 | End: 2024-12-17 | Stop reason: HOSPADM

## 2024-12-16 RX ORDER — DEXAMETHASONE SODIUM PHOSPHATE 4 MG/ML
4 INJECTION, SOLUTION INTRA-ARTICULAR; INTRALESIONAL; INTRAMUSCULAR; INTRAVENOUS; SOFT TISSUE
Status: DISCONTINUED | OUTPATIENT
Start: 2024-12-16 | End: 2024-12-17 | Stop reason: HOSPADM

## 2024-12-16 RX ORDER — SODIUM CHLORIDE, SODIUM LACTATE, POTASSIUM CHLORIDE, CALCIUM CHLORIDE 600; 310; 30; 20 MG/100ML; MG/100ML; MG/100ML; MG/100ML
INJECTION, SOLUTION INTRAVENOUS CONTINUOUS
Status: DISCONTINUED | OUTPATIENT
Start: 2024-12-16 | End: 2024-12-17 | Stop reason: HOSPADM

## 2024-12-16 RX ORDER — PROPOFOL 10 MG/ML
INJECTION, EMULSION INTRAVENOUS CONTINUOUS PRN
Status: DISCONTINUED | OUTPATIENT
Start: 2024-12-16 | End: 2024-12-16

## 2024-12-16 RX ORDER — ONDANSETRON 4 MG/1
4 TABLET, ORALLY DISINTEGRATING ORAL EVERY 30 MIN PRN
Status: DISCONTINUED | OUTPATIENT
Start: 2024-12-16 | End: 2024-12-17 | Stop reason: HOSPADM

## 2024-12-16 RX ORDER — ONDANSETRON 2 MG/ML
INJECTION INTRAMUSCULAR; INTRAVENOUS PRN
Status: DISCONTINUED | OUTPATIENT
Start: 2024-12-16 | End: 2024-12-16

## 2024-12-16 RX ORDER — DEXAMETHASONE SODIUM PHOSPHATE 10 MG/ML
INJECTION, SOLUTION INTRAMUSCULAR; INTRAVENOUS PRN
Status: DISCONTINUED | OUTPATIENT
Start: 2024-12-16 | End: 2024-12-16

## 2024-12-16 RX ORDER — IBUPROFEN 800 MG/1
800 TABLET, FILM COATED ORAL EVERY 6 HOURS PRN
Qty: 30 TABLET | Refills: 0 | Status: SHIPPED | OUTPATIENT
Start: 2024-12-16

## 2024-12-16 RX ORDER — HYDROMORPHONE HCL IN WATER/PF 6 MG/30 ML
0.2 PATIENT CONTROLLED ANALGESIA SYRINGE INTRAVENOUS EVERY 5 MIN PRN
Status: DISCONTINUED | OUTPATIENT
Start: 2024-12-16 | End: 2024-12-17 | Stop reason: HOSPADM

## 2024-12-16 RX ORDER — FENTANYL CITRATE 50 UG/ML
INJECTION, SOLUTION INTRAMUSCULAR; INTRAVENOUS PRN
Status: DISCONTINUED | OUTPATIENT
Start: 2024-12-16 | End: 2024-12-16

## 2024-12-16 RX ORDER — LIDOCAINE HYDROCHLORIDE 10 MG/ML
INJECTION, SOLUTION INFILTRATION; PERINEURAL PRN
Status: DISCONTINUED | OUTPATIENT
Start: 2024-12-16 | End: 2024-12-16

## 2024-12-16 RX ADMIN — DEXAMETHASONE SODIUM PHOSPHATE 10 MG: 10 INJECTION, SOLUTION INTRAMUSCULAR; INTRAVENOUS at 07:17

## 2024-12-16 RX ADMIN — ACETAMINOPHEN 975 MG: 325 TABLET ORAL at 06:36

## 2024-12-16 RX ADMIN — FENTANYL CITRATE 50 MCG: 50 INJECTION, SOLUTION INTRAMUSCULAR; INTRAVENOUS at 07:17

## 2024-12-16 RX ADMIN — KETOROLAC TROMETHAMINE 15 MG: 30 INJECTION, SOLUTION INTRAMUSCULAR; INTRAVENOUS at 07:47

## 2024-12-16 RX ADMIN — SODIUM CHLORIDE, SODIUM LACTATE, POTASSIUM CHLORIDE, CALCIUM CHLORIDE: 600; 310; 30; 20 INJECTION, SOLUTION INTRAVENOUS at 06:50

## 2024-12-16 RX ADMIN — PROPOFOL 200 MG: 10 INJECTION, EMULSION INTRAVENOUS at 07:17

## 2024-12-16 RX ADMIN — Medication 30 MG: at 07:17

## 2024-12-16 RX ADMIN — ONDANSETRON 4 MG: 2 INJECTION INTRAMUSCULAR; INTRAVENOUS at 07:47

## 2024-12-16 RX ADMIN — PROPOFOL 200 MCG/KG/MIN: 10 INJECTION, EMULSION INTRAVENOUS at 07:17

## 2024-12-16 RX ADMIN — OXYCODONE HYDROCHLORIDE 5 MG: 5 TABLET ORAL at 08:30

## 2024-12-16 RX ADMIN — FENTANYL CITRATE 50 MCG: 50 INJECTION, SOLUTION INTRAMUSCULAR; INTRAVENOUS at 07:34

## 2024-12-16 RX ADMIN — LIDOCAINE HYDROCHLORIDE 3 ML: 10 INJECTION, SOLUTION INFILTRATION; PERINEURAL at 07:17

## 2024-12-16 NOTE — DISCHARGE INSTRUCTIONS
If you have any questions or concerns regarding your procedure please contact Amanda Smart, her office number is [phone]    You have received 975 mg of Acetaminophen (Tylenol) at 0636. Please do not take an additional dose of Tylenol until after 12:36pm.     Do not exceed 4,000 mg of acetaminophen during a 24 hour period and keep in mind that acetaminophen can also be found in many over-the-counter cold medications as well as narcotics that may be given for pain.      You received a medication called Toradol (a stronger IV ibuprofen) at 0745. Do NOT take any Ibuprofen / Advil / Aleve / Naproxen or products containing Ibuprofen until 1:45pm or later.  Same-Day Surgery   Adult Discharge Orders & Instructions     For 24 hours after surgery    Get plenty of rest.  A responsible adult must stay with you for at least 24 hours after you leave the hospital.   Do not drive or use heavy equipment.  If you have weakness or tingling, don't drive or use heavy equipment until this feeling goes away.  Do not drink alcohol.  Avoid strenuous or risky activities.  Ask for help when climbing stairs.   You may feel lightheaded.  IF so, sit for a few minutes before standing.  Have someone help you get up.   If you have nausea (feel sick to your stomach): Drink only clear liquids such as apple juice, ginger ale, broth or 7-Up.  Rest may also help.  Be sure to drink enough fluids.  Move to a regular diet as you feel able.  You may have a slight fever. Call the doctor if your fever is over 100 F (37.7 C) (taken under the tongue) or lasts longer than 24 hours.  You may have a dry mouth, a sore throat, muscle aches or trouble sleeping.  These should go away after 24 hours.  Do not make important or legal decisions.   Call your doctor for any of the followin.  Signs of infection (fever, growing tenderness at the surgery site, a large amount of drainage or bleeding, severe pain, foul-smelling drainage, redness,  swelling).    2. It has been over 8 to 10 hours since surgery and you are still not able to urinate (pass water).    3.  Headache for over 24 hours.

## 2024-12-16 NOTE — ANESTHESIA PREPROCEDURE EVALUATION
Anesthesia Pre-Procedure Evaluation    Patient: Vernell Christensen   MRN: [id number] : 1995        Procedure : Procedure(s):  LAPAROSCOPIC BILATERAL SALPINGECTOMY          Past Medical History:   Diagnosis Date    Cholestasis during pregnancy 2018    Diabetes mellitus (H)     Symptomatic cholelithiasis 2020      Past Surgical History:   Procedure Laterality Date    LAPAROSCOPIC CHOLECYSTECTOMY N/A 2020    Procedure: CHOLECYSTECTOMY, LAPAROSCOPIC;  Surgeon: Joao Willett MD;  Location: [facility] Main OR;  Service: General      No Known Allergies   Social History     Tobacco Use    Smoking status: Never     Passive exposure: Never    Smokeless tobacco: Never   Substance Use Topics    Alcohol use: No      Wt Readings from Last 1 Encounters:   24 84.8 kg (187 lb)        Anesthesia Evaluation   Pt has had prior anesthetic.     No history of anesthetic complications       ROS/MED HX  ENT/Pulmonary:  - neg pulmonary ROS     Neurologic:  - neg neurologic ROS     Cardiovascular:  - neg cardiovascular ROS     METS/Exercise Tolerance:     Hematologic:  - neg hematologic  ROS     Musculoskeletal:       GI/Hepatic:  - neg GI/hepatic ROS     Renal/Genitourinary:  - neg Renal ROS     Endo: Comment: prediabetes    (+)               Obesity,       Psychiatric/Substance Use:     (+) psychiatric history anxiety and depression       Infectious Disease:  - neg infectious disease ROS     Malignancy:       Other:            Physical Exam    Airway        Mallampati: II   TM distance: > 3 FB   Neck ROM: full   Mouth opening: > 3 cm    Respiratory Devices and Support         Dental     Comment: Fair condition, no loose teeth        Cardiovascular          Rhythm and rate: regular and normal     Pulmonary           breath sounds clear to auscultation           OUTSIDE LABS:  CBC:   Lab Results   Component Value Date    WBC 7.9 2024    WBC 10.7 2023    HGB 12.4 2024    HGB 12.3 2023     "HCT 39.0 11/11/2024    HCT 37.2 06/14/2023     11/11/2024     06/14/2023     BMP:   Lab Results   Component Value Date     11/11/2024     06/14/2023    POTASSIUM 3.7 11/11/2024    POTASSIUM 3.6 06/14/2023    CHLORIDE 105 11/11/2024    CHLORIDE 103 06/14/2023    CO2 22 11/11/2024    CO2 26 06/14/2023    BUN 7.6 11/11/2024    BUN 5 (L) 06/14/2023    CR 0.58 11/11/2024    CR 0.68 06/14/2023     (H) 11/11/2024     06/14/2023     COAGS:   Lab Results   Component Value Date    PTT 27 06/09/2018    INR 0.92 06/09/2018     POC:   Lab Results   Component Value Date    HCG Negative 12/16/2024    HCGS Negative 11/11/2024     HEPATIC:   Lab Results   Component Value Date    ALBUMIN 4.3 11/11/2024    PROTTOTAL 7.9 11/11/2024    ALT 22 11/11/2024    AST 18 11/11/2024    ALKPHOS 124 11/11/2024    BILITOTAL 0.2 11/11/2024     OTHER:   Lab Results   Component Value Date    PH 4.0 (L) 10/01/2021    A1C 5.7 (H) 04/26/2021    KIRT 8.8 11/11/2024    MAG 2.0 11/11/2024    LIPASE 25 11/11/2024    TSH 2.70 11/11/2024    CRP 1.5 (H) 04/27/2023       Anesthesia Plan    ASA Status:  2       Anesthesia Type: General.     - Airway: ETT              Consents    Anesthesia Plan(s) and associated risks, benefits, and realistic alternatives discussed. Questions answered and patient/representative(s) expressed understanding.     - Discussed: Risks, Benefits and Alternatives for BOTH SEDATION and the PROCEDURE were discussed     - Discussed with:  Patient            Postoperative Care            Comments:               Carlotta Meng MD    I have reviewed the pertinent notes and labs in the chart from the past 30 days and (re)examined the patient.  Any updates or changes from those notes are reflected in this note.                         # Obesity: Estimated body mass index is 32.1 kg/m  as calculated from the following:    Height as of this encounter: 1.626 m (5' 4\").    Weight as of this encounter: 84.8 kg " (187 lb).

## 2024-12-16 NOTE — H&P
The History and Physical has been reviewed, the patient has been examined and no changes have occurred in the patient's condition since the H & P was completed.     Amanda Greene MD on 12/16/2024 at 7:07 AM

## 2024-12-16 NOTE — ANESTHESIA PROCEDURE NOTES
Airway       Patient location during procedure: OR       Procedure Start/Stop Times: 12/16/2024 7:21 AM  Staff -        Anesthesiologist:  Carlotta Meng MD       CRNA: Ney Castro APRN CRNA       Performed By: CRNA  Consent for Airway        Urgency: elective  Indications and Patient Condition       Indications for airway management: jose-procedural       Induction type:intravenous       Mask difficulty assessment: 1 - vent by mask    Final Airway Details       Final airway type: endotracheal airway       Successful airway: ETT - single and Oral  Endotracheal Airway Details        ETT size (mm): 7.0       Cuffed: yes       Cuff volume (mL): 8       Successful intubation technique: direct laryngoscopy       DL Blade Type: Pierce 2       Grade View of Cords: 1       Adjucts: stylet       Position: Right       Measured from: lips       Secured at (cm): 22       Bite block used: None    Post intubation assessment        Placement verified by: capnometry, equal breath sounds and chest rise        Number of attempts at approach: 1       Number of other approaches attempted: 0       Secured with: tape       Ease of procedure: easy       Dentition: Intact and Unchanged       Dental guard used and removed. Dental Guard Type: Proguard Red.    Medication(s) Administered   Medication Administration Time: 12/16/2024 7:21 AM

## 2024-12-16 NOTE — OP NOTE
Preoperative diagnoses: Sterilization request  Postoperative diagnoses: Sterilization request  Procedure: Laparoscopic tubal sterilization, bilateral salpingectomies  Surgeon: Amanda Greene MD  Assistant: GEREMIAS  Findings: normal pelvis, ovaries, uterus and tubes  EBL: 2 ml    Procedure:    After risks of bleeding, infection, reaction to the anesthesia, damage to bowels, bladder or other organs and risk of failure of about 5-7/1000 were discussed with the patient, she signed informed consent.    She was then taken to the operating room where general anesthesia was found to be adequate.  She was then placed in the dorsal lithotomy position and prepared and draped in the usual sterile fashion.  Her bladder was drained with a straight catheter.    Surgical pause was initiated with identification of the patient, site, procedure, specimen and risks, and with everyone in the room in agreement.    A sterile speculum was placed in the vagina, the cervix was identified. A stick sponge was left in the vagina.  Attention was then placed to the umbilicus that was grasped with Iron clamps.  The umbilicus and a 5 mm incision was made with the scalpel and the 5 mm trocar was inserted in the abdominal cavity under direct visualization with the scope.    The patient was placed in Trendelenburg position and the pelvis was inspected with findings as noted above.    Two other incisions were made in the lower pelvis lateral to the inferior epigastric vessels.    The right fimbria were grasped and the mesosalpinx of the tube was serially grasped, sealed and cut and the tube was removed through the right trocar and the specimen was sent to pathology.  The same procedure was done on the left side.    At this point, all the instruments were removed from the abdominal cavity and the pneumoperitoneum was resolved.  The skin incisions were closed with subcuticular stitches of 4/0 Vicryl.    Sponges and needle counts were correct times  "two.  The patient tolerated the procedure well and went to recovery in good condition.    EBL: 2 ml    Amanda Greene MD on 12/16/2024 at 7:59 AM\"   "

## 2024-12-16 NOTE — ANESTHESIA CARE TRANSFER NOTE
Patient: Vernell Christensen    Procedure: Procedure(s):  LAPAROSCOPIC BILATERAL SALPINGECTOMY       Diagnosis: Encounter for sterilization [Z30.2]  Diagnosis Additional Information: No value filed.    Anesthesia Type:   General     Note:    Oropharynx: oropharynx clear of all foreign objects and spontaneously breathing  Level of Consciousness: drowsy  Oxygen Supplementation: face mask  Level of Supplemental Oxygen (L/min / FiO2): 10  Independent Airway: airway patency satisfactory and stable  Dentition: dentition unchanged  Vital Signs Stable: post-procedure vital signs reviewed and stable  Report to RN Given: handoff report given  Patient transferred to: PACU    Handoff Report: Identifed the Patient, Identified the Reponsible Provider, Reviewed the pertinent medical history, Discussed the surgical course, Reviewed Intra-OP anesthesia mangement and issues during anesthesia, Set expectations for post-procedure period and Allowed opportunity for questions and acknowledgement of understanding      Vitals:  Vitals Value Taken Time   /65 12/16/24 0800   Temp 96.9  F (36.1  C) 12/16/24 0800   Pulse 81 12/16/24 0800   Resp 15 12/16/24 0800   SpO2 100 % 12/16/24 0800       Electronically Signed By: DENIS Uribe CRNA  December 16, 2024  8:06 AM

## 2024-12-16 NOTE — ANESTHESIA POSTPROCEDURE EVALUATION
Patient: Vernell Christensen    Procedure: Procedure(s):  LAPAROSCOPIC BILATERAL SALPINGECTOMY       Anesthesia Type:  General    Note:  Disposition: Outpatient   Postop Pain Control: Uneventful            Sign Out: Well controlled pain   PONV: No   Neuro/Psych: Uneventful            Sign Out: Acceptable/Baseline neuro status   Airway/Respiratory: Uneventful            Sign Out: Acceptable/Baseline resp. status   CV/Hemodynamics: Uneventful            Sign Out: Acceptable CV status; No obvious hypovolemia; No obvious fluid overload   Other NRE:    DID A NON-ROUTINE EVENT OCCUR? No           Last vitals:  Vitals Value Taken Time   /57 12/16/24 0810   Temp 96.9  F (36.1  C) 12/16/24 0800   Pulse 76 12/16/24 0834   Resp 15 12/16/24 0810   SpO2 95 % 12/16/24 0834   Vitals shown include unfiled device data.    Electronically Signed By: Carlotta Meng MD  December 16, 2024  9:30 AM

## 2025-01-01 NOTE — PROGRESS NOTES
Clinic Care Coordination Contact  Care Team Conversations    CYNDI DOUGHERTY performed chart review and noted the pt is currently in a procedure. CYNDI CC will follow up within 10 business days to outreach to the pt.     Laura Hylton [facility]  Social Work Care Coordinator - [facility]  Care Coordination  [email or web address]  Cell Phone: [phone]  Gender pronouns: she/her  Employed by [employer]

## 2025-01-08 ENCOUNTER — PATIENT OUTREACH (OUTPATIENT)
Dept: CARE COORDINATION | Facility: CLINIC | Age: 30
End: 2025-01-08

## 2025-02-12 ENCOUNTER — PATIENT OUTREACH (OUTPATIENT)
Dept: CARE COORDINATION | Facility: CLINIC | Age: 30
End: 2025-02-12
Payer: COMMERCIAL

## 2025-02-12 NOTE — LETTER
[facility]  Patient Centered Plan of Care  About Me:        Patient Name:  Vernell Christensen    YOB: 1995  Age:         29 year old   Valentín MRN:    [id number] Telephone Information:  Home Phone [phone]   Mobile [phone]       Address:  [address] [facility] [address] Email address:  [email or web address]      Emergency Contact(s)    Name Relationship Lgl Grd Work Phone Home Phone Mobile Phone   1. VIJAY VARGAS Spouse    [phone]           Primary language:  Syriac     needed? Yes   [employer]:  [phone] op. 1  Other communication barriers:Language barrier    Preferred Method of Communication:     Current living arrangement: I live in a private home    Mobility Status/ Medical Equipment: Independent        Health Maintenance  Health Maintenance Reviewed: Not assessed      My Access Plan  Medical Emergency 911   Primary Clinic Line [facility] - [phone]   24 Hour Appointment Line [phone] or  [phone]) (toll-free)   24 Hour Nurse Line [phone] (toll-free)   Preferred Urgent Care Other     Preferred Hospital Other     Preferred Pharmacy [facility] [address] [place] [address]     Behavioral Health Crisis Line The National Suicide Prevention Lifeline at 1-583.332.1430 or Text/Call 468           My Care Team Members  Patient Care Team         Relationship Specialty Notifications Start End    [facility] PCP - General   11/11/24     Phone: [phone] Fax: [phone]         [address] [place] [address]    Wellington Rose MD Assigned PCP   5/27/23     Phone: [phone] Fax: [phone]         [address] [place] [address]    Laura Hylton LGSW Lead Care Coordinator  Admissions 10/24/24                 My Care Plans  Self Management and Treatment Plan    Care Plan  Care Plan: General        Problem: General Assistance       Goal: Insurance Card Replacement       Start Date: 10/29/2024 Expected End Date: 1/29/2025    This Visit's Progress: 100%    Note:     Barriers: language barrier   Strengths: strong advocate for self  Patient expressed understanding of goal: yes   Action steps to achieve this goal:  1. I will connect with [facility] to find out about getting cards replaced.   2. I will connect with the [facility] with any questions or concerns.                                   Action Plans on File:                       Advance Care Plans/Directives:   Advanced Care Plan/Directives on file: No    Discussed with patient/caregiver(s): No data recorded             My Medical and Care Information  Problem List   Patient Active Problem List   Diagnosis    Class 2 obesity    Drug-induced weight gain    Insulin resistance    Morbid obesity (H)    Pregnancy test positive      Current Medications:  Please refer to the most recent medication list provided to you by your medical team and reach out to your provider with any questions or to make any corrections.    Care Coordination Start Date: 10/23/2024   Frequency of Care Coordination: 2 months, more frequently as needed     Form Last Updated: 02/25/2025

## 2025-02-25 ENCOUNTER — LAB REQUISITION (OUTPATIENT)
Dept: LAB | Facility: CLINIC | Age: 30
End: 2025-02-25

## 2025-02-25 DIAGNOSIS — E11.9 TYPE 2 DIABETES MELLITUS WITHOUT COMPLICATIONS (H): ICD-10-CM

## 2025-02-25 PROCEDURE — 80061 LIPID PANEL: CPT | Performed by: FAMILY MEDICINE

## 2025-02-25 PROCEDURE — 82043 UR ALBUMIN QUANTITATIVE: CPT | Performed by: FAMILY MEDICINE

## 2025-02-25 PROCEDURE — 83721 ASSAY OF BLOOD LIPOPROTEIN: CPT | Performed by: FAMILY MEDICINE

## 2025-02-25 NOTE — PROGRESS NOTES
Clinic Care Coordination Contact  Follow Up Progress Note      Assessment: CYNDI CC outreached to the pt for follow up. Pt shared she hadn't gotten a new card yet.     Care Gaps:    Health Maintenance Due   Topic Date Due    ADVANCE CARE PLANNING  Never done    YEARLY PREVENTIVE VISIT  04/26/2022    HEPATITIS B IMMUNIZATION (2 of 3 - 19+ 3-dose series) 05/30/2023    PAP  04/26/2024    INFLUENZA VACCINE (1) 09/01/2024    COVID-19 Vaccine (4 - 2024-25 season) 09/01/2024    PHQ-2 (once per calendar year)  01/01/2025       Currently there are no Care Gaps.    Care Plans  Care Plan: General       Problem: General Assistance       Goal: Insurance Card Replacement       Start Date: 10/29/2024 Expected End Date: 1/29/2025    This Visit's Progress: 100%    Note:     Barriers: language barrier   Strengths: strong advocate for self  Patient expressed understanding of goal: yes   Action steps to achieve this goal:  1. I will connect with [facility] to find out about getting cards replaced.   2. I will connect with the CYNDI CC with any questions or concerns.                                   Intervention/Education provided during outreach: Patient verbalized understanding, engaged in AIDET communication during patient encounter.       Outreach Frequency: 2 months, more frequently as needed    Plan:   Pt to connect with insurance company to get new card.   Care Coordinator will follow up in 1 month.     Laura Hylton [facility]  Social Work Care Coordinator - [facility]  Care Coordination  [email or web address]  Cell Phone: [phone]  Gender pronouns: she/her  Employed by [employer]

## 2025-02-25 NOTE — PROGRESS NOTES
Clinic Care Coordination Contact  [facility]/Voicemail    Clinical Data: Care Coordinator Outreach    Outreach Documentation Number of Outreach Attempt   2/12/2025   1:33 PM 1       Left message on patient's voicemail with call back information and requested return call.      Plan: Care Coordinator will try to reach patient again in 10 business days.    Laura Hylton CYNDI  Social Work Care Coordinator - [facility]  Care Coordination  [email or web address]  Cell Phone: [phone]  Gender pronouns: she/her  Employed by [employer]

## 2025-02-26 ENCOUNTER — PATIENT OUTREACH (OUTPATIENT)
Dept: CARE COORDINATION | Facility: CLINIC | Age: 30
End: 2025-02-26
Payer: COMMERCIAL

## 2025-02-26 LAB
CHOLEST SERPL-MCNC: 172 MG/DL
CREAT UR-MCNC: 226 MG/DL
FASTING STATUS PATIENT QL REPORTED: NO
HDLC SERPL-MCNC: 33 MG/DL
LDLC SERPL CALC-MCNC: ABNORMAL MG/DL
LDLC SERPL DIRECT ASSAY-MCNC: 62 MG/DL
MICROALBUMIN UR-MCNC: 39.1 MG/L
MICROALBUMIN/CREAT UR: 17.3 MG/G CR (ref 0–25)
NONHDLC SERPL-MCNC: 139 MG/DL
TRIGL SERPL-MCNC: 626 MG/DL

## 2025-03-15 ENCOUNTER — HEALTH MAINTENANCE LETTER (OUTPATIENT)
Age: 30
End: 2025-03-15

## 2025-03-17 NOTE — PROGRESS NOTES
Clinic Care Coordination Contact  [facility]/Voicemail    Clinical Data: Care Coordinator Outreach    Outreach Documentation Number of Outreach Attempt   2/12/2025   1:33 PM 1   2/26/2025  10:15 PM 2       Left message on patient's voicemail with call back information and requested return call.    Plan: Care Coordinator will try to reach patient again in 1 month.    Laura Hylton, [facility]  Social Work Care Coordinator - [facility]  Care Coordination  [email or web address]  Cell Phone: [phone]  Gender pronouns: she/her  Employed by [employer]

## 2025-03-26 ENCOUNTER — PATIENT OUTREACH (OUTPATIENT)
Dept: CARE COORDINATION | Facility: CLINIC | Age: 30
End: 2025-03-26
Payer: COMMERCIAL

## 2025-03-26 NOTE — PROGRESS NOTES
Clinic Care Coordination Contact  [facility]/[place]    Clinical Data: Care Coordinator Outreach    Outreach Documentation Number of Outreach Attempt   2/12/2025   1:33 PM 1   2/26/2025  10:15 PM 2   3/26/2025   5:20 PM 3       Unable to leave a message due to:  ended call before leaving a message       Plan:  Care Coordinator will try to reach patient again in 10 business days.    Laura Hylton [facility]  Social Work Care Coordinator - [facility]  Care Coordination  [email or web address]  Cell Phone: [phone]  Gender pronouns: she/her  Employed by [employer]

## 2025-04-09 ENCOUNTER — PATIENT OUTREACH (OUTPATIENT)
Dept: CARE COORDINATION | Facility: CLINIC | Age: 30
End: 2025-04-09
Payer: COMMERCIAL

## 2025-04-10 NOTE — PROGRESS NOTES
Clinic Care Coordination Contact  Care Team Conversations    CYNDI CC outreached to the pt to check in and inquire if she was needing any assistance with resources or supports and she shared that she is doing good currently and no new needs. CYNDI CC communicated via  during the call.     CYNDI CC will close out program and do no further outreaches. Will outreach if new referral comes through.    Laura Hylton, [facility]  Social Work Care Coordinator - [facility]  Care Coordination  [email or web address]  Cell Phone: [phone]  Gender pronouns: she/her  Employed by [employer]

## 2025-06-20 ENCOUNTER — HOSPITAL ENCOUNTER (EMERGENCY)
Facility: CLINIC | Age: 30
Discharge: LEFT BEFORE DISCHARGE | End: 2025-06-21
Admitting: EMERGENCY MEDICINE
Payer: COMMERCIAL

## 2025-06-20 VITALS
HEART RATE: 107 BPM | OXYGEN SATURATION: 95 % | TEMPERATURE: 98.2 F | SYSTOLIC BLOOD PRESSURE: 122 MMHG | RESPIRATION RATE: 16 BRPM | DIASTOLIC BLOOD PRESSURE: 88 MMHG

## 2025-06-20 LAB
ALBUMIN SERPL BCG-MCNC: 4.7 G/DL (ref 3.5–5.2)
ALP SERPL-CCNC: 124 U/L (ref 40–150)
ALT SERPL W P-5'-P-CCNC: 41 U/L (ref 0–50)
ANION GAP SERPL CALCULATED.3IONS-SCNC: 16 MMOL/L (ref 7–15)
AST SERPL W P-5'-P-CCNC: 30 U/L (ref 0–45)
BASOPHILS # BLD AUTO: 0.1 10E3/UL (ref 0–0.2)
BASOPHILS NFR BLD AUTO: 1 %
BILIRUB SERPL-MCNC: 0.5 MG/DL
BUN SERPL-MCNC: 11.2 MG/DL (ref 6–20)
CALCIUM SERPL-MCNC: 9.5 MG/DL (ref 8.8–10.4)
CHLORIDE SERPL-SCNC: 99 MMOL/L (ref 98–107)
CREAT SERPL-MCNC: 0.64 MG/DL (ref 0.51–0.95)
EGFRCR SERPLBLD CKD-EPI 2021: >90 ML/MIN/1.73M2
EOSINOPHIL # BLD AUTO: 0.2 10E3/UL (ref 0–0.7)
EOSINOPHIL NFR BLD AUTO: 2 %
ERYTHROCYTE [DISTWIDTH] IN BLOOD BY AUTOMATED COUNT: 14.3 % (ref 10–15)
GLUCOSE SERPL-MCNC: 109 MG/DL (ref 70–99)
HCO3 SERPL-SCNC: 21 MMOL/L (ref 22–29)
HCT VFR BLD AUTO: 42.5 % (ref 35–47)
HGB BLD-MCNC: 14.1 G/DL (ref 11.7–15.7)
IMM GRANULOCYTES # BLD: 0 10E3/UL
IMM GRANULOCYTES NFR BLD: 0 %
LYMPHOCYTES # BLD AUTO: 2.4 10E3/UL (ref 0.8–5.3)
LYMPHOCYTES NFR BLD AUTO: 16 %
MCH RBC QN AUTO: 25.4 PG (ref 26.5–33)
MCHC RBC AUTO-ENTMCNC: 33.2 G/DL (ref 31.5–36.5)
MCV RBC AUTO: 77 FL (ref 78–100)
MONOCYTES # BLD AUTO: 0.7 10E3/UL (ref 0–1.3)
MONOCYTES NFR BLD AUTO: 5 %
NEUTROPHILS # BLD AUTO: 11.1 10E3/UL (ref 1.6–8.3)
NEUTROPHILS NFR BLD AUTO: 77 %
NRBC # BLD AUTO: 0 10E3/UL
NRBC BLD AUTO-RTO: 0 /100
PLATELET # BLD AUTO: 312 10E3/UL (ref 150–450)
POTASSIUM SERPL-SCNC: 3.6 MMOL/L (ref 3.4–5.3)
PROT SERPL-MCNC: 9.1 G/DL (ref 6.4–8.3)
RBC # BLD AUTO: 5.55 10E6/UL (ref 3.8–5.2)
SODIUM SERPL-SCNC: 136 MMOL/L (ref 135–145)
WBC # BLD AUTO: 14.5 10E3/UL (ref 4–11)

## 2025-06-20 PROCEDURE — 80053 COMPREHEN METABOLIC PANEL: CPT | Performed by: EMERGENCY MEDICINE

## 2025-06-20 PROCEDURE — 36415 COLL VENOUS BLD VENIPUNCTURE: CPT | Performed by: EMERGENCY MEDICINE

## 2025-06-20 PROCEDURE — 85004 AUTOMATED DIFF WBC COUNT: CPT | Performed by: EMERGENCY MEDICINE

## 2025-06-20 PROCEDURE — 99281 EMR DPT VST MAYX REQ PHY/QHP: CPT | Performed by: EMERGENCY MEDICINE

## 2025-06-20 ASSESSMENT — ACTIVITIES OF DAILY LIVING (ADL): ADLS_ACUITY_SCORE: 47

## 2025-06-21 ENCOUNTER — OFFICE VISIT (OUTPATIENT)
Dept: URGENT CARE | Facility: URGENT CARE | Age: 30
End: 2025-06-21
Payer: COMMERCIAL

## 2025-06-21 VITALS
HEIGHT: 64 IN | TEMPERATURE: 97.8 F | SYSTOLIC BLOOD PRESSURE: 118 MMHG | RESPIRATION RATE: 16 BRPM | HEART RATE: 105 BPM | WEIGHT: 206 LBS | DIASTOLIC BLOOD PRESSURE: 82 MMHG | BODY MASS INDEX: 35.17 KG/M2 | OXYGEN SATURATION: 97 %

## 2025-06-21 DIAGNOSIS — H10.13 ALLERGIC CONJUNCTIVITIS, BILATERAL: ICD-10-CM

## 2025-06-21 DIAGNOSIS — A08.4 VIRAL ENTERITIS: Primary | ICD-10-CM

## 2025-06-21 LAB — HOLD SPECIMEN: NORMAL

## 2025-06-21 PROCEDURE — 99213 OFFICE O/P EST LOW 20 MIN: CPT | Performed by: INTERNAL MEDICINE

## 2025-06-21 PROCEDURE — 3079F DIAST BP 80-89 MM HG: CPT | Performed by: INTERNAL MEDICINE

## 2025-06-21 PROCEDURE — 3074F SYST BP LT 130 MM HG: CPT | Performed by: INTERNAL MEDICINE

## 2025-06-21 RX ORDER — OLOPATADINE HYDROCHLORIDE 1 MG/ML
1 SOLUTION OPHTHALMIC 2 TIMES DAILY
Qty: 5 ML | Refills: 0 | Status: SHIPPED | OUTPATIENT
Start: 2025-06-21

## 2025-06-21 RX ORDER — SEMAGLUTIDE 0.68 MG/ML
INJECTION, SOLUTION SUBCUTANEOUS
COMMUNITY
Start: 2025-02-25

## 2025-06-21 RX ORDER — ONDANSETRON 4 MG/1
4 TABLET, ORALLY DISINTEGRATING ORAL EVERY 8 HOURS PRN
Qty: 12 TABLET | Refills: 0 | Status: SHIPPED | OUTPATIENT
Start: 2025-06-21

## 2025-06-21 RX ORDER — ONDANSETRON 4 MG/1
4 TABLET, ORALLY DISINTEGRATING ORAL ONCE
Status: COMPLETED | OUTPATIENT
Start: 2025-06-21 | End: 2025-06-21

## 2025-06-21 RX ADMIN — ONDANSETRON 4 MG: 4 TABLET, ORALLY DISINTEGRATING ORAL at 11:51

## 2025-06-21 ASSESSMENT — ACTIVITIES OF DAILY LIVING (ADL)
ADLS_ACUITY_SCORE: 47

## 2025-06-21 NOTE — PROGRESS NOTES
"SUBJECTIVE:  Chief complaint of vomiting and diarrhea x 24 hours.  Associated fevers and chills.  Took some Gatorade last night at 3:00 AM and did not vomit. Denies dysuria, hematuria, pyuria. Diarrhea was watery, no blood in the stool, no mucus. Last bowel movement around 1:00 AM and nothing since. Stool frequency yesterday > 5 x. Also noting some eye irritation which she relates to allergies.    CMEDS:   Metformin, Ozempic    PMH:   Pre-diabetes, gestational HTN    OBJECTIVE:  /82   Pulse 105   Temp 97.8  F (36.6  C) (Tympanic)   Resp 16   Ht 1.626 m (5' 4\")   Wt 93.4 kg (206 lb)   SpO2 97%   Breastfeeding No   BMI 35.36 kg/m    GENERAL: healthy, alert and no distress  EYES: mild bilateral conjunctival erythema with watery discharge  HENT: ear canals and TM's normal and nose and mouth without ulcers or lesions  NECK: no adenopathy, no asymmetry, masses, or scars and thyroid normal to palpation  RESP: clear to auscultation and percussion bilaterally; normal I:E ratio  CV: regular rates and rhythm, normal S1 S2, no S3 or S4 and no murmur, click or rub -  ABDOMEN: soft, non-distended, and with mild generalized tenderness without rebound/guarding.  There is no hepatosplenomegaly or masses and normal bowel sounds.  EXT: no cyanosis, clubbing or edema; peripheral pulses are brisk and symmetric in the radials, dorsalis pedis and posterior tibials bilaterally    CLINIC INTERVENTIONS: Given ondansetron 4 mg ODT and juice.  She took several oz juice with good tolerance.    ASSESSMENT/PLAN:    ICD-10-CM    1. Viral enteritis  A08.4 ondansetron (ZOFRAN ODT) ODT tab 4 mg     ondansetron (ZOFRAN ODT) 4 MG ODT tab      2. Allergic conjunctivitis, bilateral  H10.13 olopatadine (PATANOL) 0.1 % ophthalmic solution          Yared Milan MD     "

## 2025-06-21 NOTE — ED TRIAGE NOTES
Pt c/o RUQ pain starting today. Endorses N&V&D and chills.  at bedside. Declined covid swab      Triage Assessment (Adult)       Row Name 06/20/25 4731          Triage Assessment    Airway WDL WDL        Respiratory WDL    Respiratory WDL WDL        Skin Circulation/Temperature WDL    Skin Circulation/Temperature WDL WDL        Cardiac WDL    Cardiac WDL WDL        Peripheral/Neurovascular WDL    Peripheral Neurovascular WDL WDL        Cognitive/Neuro/Behavioral WDL    Cognitive/Neuro/Behavioral WDL WDL

## 2025-06-28 ENCOUNTER — HEALTH MAINTENANCE LETTER (OUTPATIENT)
Age: 30
End: 2025-06-28